# Patient Record
Sex: FEMALE | Race: WHITE | NOT HISPANIC OR LATINO | Employment: UNEMPLOYED | URBAN - METROPOLITAN AREA
[De-identification: names, ages, dates, MRNs, and addresses within clinical notes are randomized per-mention and may not be internally consistent; named-entity substitution may affect disease eponyms.]

---

## 2021-10-28 ENCOUNTER — OFFICE VISIT (OUTPATIENT)
Dept: FAMILY MEDICINE CLINIC | Facility: CLINIC | Age: 21
End: 2021-10-28

## 2021-10-28 VITALS
RESPIRATION RATE: 16 BRPM | OXYGEN SATURATION: 100 % | DIASTOLIC BLOOD PRESSURE: 96 MMHG | HEIGHT: 64 IN | WEIGHT: 245 LBS | SYSTOLIC BLOOD PRESSURE: 118 MMHG | HEART RATE: 95 BPM | BODY MASS INDEX: 41.83 KG/M2

## 2021-10-28 DIAGNOSIS — N92.6 MISSED PERIOD: Primary | ICD-10-CM

## 2021-10-28 LAB
B-HCG UR QL: NEGATIVE
EXPIRATION DATE: NORMAL
INTERNAL NEGATIVE CONTROL: NORMAL
INTERNAL POSITIVE CONTROL: NORMAL
Lab: NORMAL

## 2021-10-28 PROCEDURE — 99203 OFFICE O/P NEW LOW 30 MIN: CPT | Performed by: NURSE PRACTITIONER

## 2021-10-28 PROCEDURE — 81025 URINE PREGNANCY TEST: CPT | Performed by: NURSE PRACTITIONER

## 2021-10-28 NOTE — PATIENT INSTRUCTIONS
Advised patient to get over the counter prenatal vitamin with folic acid.   Return if symptoms worsen or fail to improve.  Call with any questions or concerns.  I will call you with your blood test results.

## 2021-10-28 NOTE — PROGRESS NOTES
Dinh Gaviria is a 21 y.o. female.     History of Present Illness   Patient presents with c/o missed period X 7 days. She reports that she had a positive urine pregnancy and a negative urine pregnancy at home. This is a planned pregnancy and patient is excited. She has a four year old at home.     The following portions of the patient's history were reviewed and updated as appropriate: allergies, current medications, past family history, past medical history, past social history, past surgical history and problem list.    Review of Systems   Constitutional: Negative for chills, fatigue and fever.   Respiratory: Negative for cough, chest tightness and shortness of breath.    Cardiovascular: Negative for chest pain, palpitations and leg swelling.   Gastrointestinal: Positive for nausea.   Genitourinary: Negative for breast discharge, breast lump, breast pain, dysuria, flank pain, frequency, hematuria, pelvic pain, pelvic pressure, vaginal bleeding and vaginal discharge.        Breast tenderness   Neurological: Negative for dizziness, weakness and headache.   Hematological: Negative.    Psychiatric/Behavioral: Negative for agitation, behavioral problems and hallucinations.       Objective   Physical Exam  Vitals and nursing note reviewed.   Constitutional:       Appearance: She is well-developed.   HENT:      Head: Normocephalic and atraumatic.      Right Ear: External ear normal.      Left Ear: External ear normal.      Nose: Nose normal.   Eyes:      Conjunctiva/sclera: Conjunctivae normal.      Pupils: Pupils are equal, round, and reactive to light.   Neck:      Thyroid: No thyromegaly.   Cardiovascular:      Rate and Rhythm: Normal rate and regular rhythm.      Heart sounds: Normal heart sounds. No murmur heard.      Pulmonary:      Effort: Pulmonary effort is normal.      Breath sounds: Normal breath sounds.   Abdominal:      General: Bowel sounds are normal. There is no distension.      Palpations:  Abdomen is soft.      Tenderness: There is no abdominal tenderness.   Musculoskeletal:         General: No deformity. Normal range of motion.      Cervical back: Normal range of motion and neck supple.   Lymphadenopathy:      Cervical: No cervical adenopathy.   Skin:     General: Skin is warm and dry.   Neurological:      Mental Status: She is alert and oriented to person, place, and time.      Cranial Nerves: No cranial nerve deficit.   Psychiatric:         Behavior: Behavior normal.         Thought Content: Thought content normal.         Judgment: Judgment normal.         Vitals:    10/28/21 1349   BP: 118/96   Pulse: 95   Resp: 16   SpO2: 100%     Body mass index is 41.83 kg/m².    Procedures    Assessment/Plan   Problems Addressed this Visit     None      Visit Diagnoses     Missed period    -  Primary    Relevant Orders    POCT pregnancy, urine (Completed)    HCG, B-subunit, Quantitative    Ambulatory Referral to Obstetrics / Gynecology      Diagnoses       Codes Comments    Missed period    -  Primary ICD-10-CM: N92.6  ICD-9-CM: 626.4         Advised patient to get over the counter prenatal vitamin with folic acid.   Referral to OB/GYN  Discussed medications that are safe and not safe during pregnancy.        Return if symptoms worsen or fail to improve.

## 2021-10-29 LAB — HCG INTACT+B SERPL-ACNC: <1 MIU/ML

## 2021-10-29 NOTE — PROGRESS NOTES
Please call patient and let them know that their results were negative. Please tell patient that it just may be too soon to test and wait until about a month past her period or she's had another positive urine test at home.

## 2021-12-10 ENCOUNTER — INITIAL PRENATAL (OUTPATIENT)
Dept: OBSTETRICS AND GYNECOLOGY | Facility: CLINIC | Age: 21
End: 2021-12-10

## 2021-12-10 VITALS — DIASTOLIC BLOOD PRESSURE: 89 MMHG | SYSTOLIC BLOOD PRESSURE: 135 MMHG | WEIGHT: 246 LBS | BODY MASS INDEX: 42 KG/M2

## 2021-12-10 DIAGNOSIS — Z13.71 SCREENING FOR GENETIC DISEASE CARRIER STATUS: ICD-10-CM

## 2021-12-10 DIAGNOSIS — O99.211 MATERNAL MORBID OBESITY IN FIRST TRIMESTER, ANTEPARTUM (HCC): ICD-10-CM

## 2021-12-10 DIAGNOSIS — Z12.4 SCREENING FOR CERVICAL CANCER: ICD-10-CM

## 2021-12-10 DIAGNOSIS — F41.9 ANXIETY IN PREGNANCY IN FIRST TRIMESTER, ANTEPARTUM: ICD-10-CM

## 2021-12-10 DIAGNOSIS — O09.291 HISTORY OF GESTATIONAL DIABETES IN PRIOR PREGNANCY, CURRENTLY PREGNANT IN FIRST TRIMESTER: ICD-10-CM

## 2021-12-10 DIAGNOSIS — Z86.32 HISTORY OF GESTATIONAL DIABETES IN PRIOR PREGNANCY, CURRENTLY PREGNANT IN FIRST TRIMESTER: ICD-10-CM

## 2021-12-10 DIAGNOSIS — Z34.91 PREGNANCY WITH UNCERTAIN DATES IN FIRST TRIMESTER: ICD-10-CM

## 2021-12-10 DIAGNOSIS — O34.219 PREVIOUS CESAREAN DELIVERY, ANTEPARTUM: ICD-10-CM

## 2021-12-10 DIAGNOSIS — O09.91 HIGH-RISK PREGNANCY IN FIRST TRIMESTER: Primary | ICD-10-CM

## 2021-12-10 DIAGNOSIS — E66.01 MATERNAL MORBID OBESITY IN FIRST TRIMESTER, ANTEPARTUM (HCC): ICD-10-CM

## 2021-12-10 DIAGNOSIS — O99.810 ABNORMAL GLUCOSE AFFECTING PREGNANCY: ICD-10-CM

## 2021-12-10 DIAGNOSIS — O99.341 ANXIETY IN PREGNANCY IN FIRST TRIMESTER, ANTEPARTUM: ICD-10-CM

## 2021-12-10 DIAGNOSIS — O09.291 HISTORY OF PRE-ECLAMPSIA IN PRIOR PREGNANCY, CURRENTLY PREGNANT IN FIRST TRIMESTER: ICD-10-CM

## 2021-12-10 DIAGNOSIS — Z11.3 SCREEN FOR STD (SEXUALLY TRANSMITTED DISEASE): ICD-10-CM

## 2021-12-10 LAB
GLUCOSE UR STRIP-MCNC: NEGATIVE MG/DL
PROT UR STRIP-MCNC: NEGATIVE MG/DL

## 2021-12-10 PROCEDURE — 99204 OFFICE O/P NEW MOD 45 MIN: CPT | Performed by: OBSTETRICS & GYNECOLOGY

## 2021-12-10 RX ORDER — SWAB
1 SWAB, NON-MEDICATED MISCELLANEOUS DAILY
Qty: 90 EACH | Refills: 3 | Status: SHIPPED | OUTPATIENT
Start: 2021-12-10 | End: 2021-12-21 | Stop reason: SDUPTHER

## 2021-12-10 RX ORDER — ASPIRIN 81 MG/1
81 TABLET, CHEWABLE ORAL DAILY
Qty: 30 TABLET | Refills: 7 | Status: SHIPPED | OUTPATIENT
Start: 2021-12-10 | End: 2022-02-25 | Stop reason: SDUPTHER

## 2021-12-10 NOTE — PROGRESS NOTES
Initial ob visit     CC- Here for care of pregnancy     Beth Gaviria is being seen today for her first obstetrical visit.  She is a 21 y.o.    10w0d gestation.     # 1 - Date: 18, Sex: Male, Weight: 4479 g (9 lb 14 oz), GA: 39w4d, Delivery: , Low Transverse, Apgar1: None, Apgar5: None, Living: Living, Birth Comments: None    # 2 - Date: None, Sex: None, Weight: None, GA: None, Delivery: None, Apgar1: None, Apgar5: None, Living: None, Birth Comments: None      Current obstetric complaints : nausea   Duration/severity of complaints: 1 week  Initial positive test date : 2021     Location : @ home  Negative test in October    Prior obstetric issues, potential pregnancy concerns: GDM, Preeclampsia, Depression,.   Family history of genetic issues (includes FOB): none  Prior infections concerning in pregnancy (Rash, fever in last 2 weeks): none  Varicella Hx -immunity  Prior testing for Cystic Fibrosis Carrier or Sickle Cell Trait- unknown status   Prepregnancy BMI - Body mass index is 42 kg/m².  Hx of HSV for patient or partner : No     Past Medical History:   Diagnosis Date   • ADHD (attention deficit hyperactivity disorder)    • Anxiety    • Depression    • Diabetes mellitus (HCC)     gestational   • Gestational diabetes    • PONV (postoperative nausea and vomiting)    • Preeclampsia    • Varicella        Past Surgical History:   Procedure Laterality Date   •  SECTION     • CHOLECYSTECTOMY     • LAPAROSCOPIC CHOLECYSTECTOMY  2018   • TONSILLECTOMY     • WISDOM TOOTH EXTRACTION           Current Outpatient Medications:   •  aspirin 81 MG chewable tablet, Chew 1 tablet Daily., Disp: 30 tablet, Rfl: 7  •  Prenatal 28-0.8 MG tablet, Take 1 tablet by mouth Daily. Please use formulary or generic, with DHA ideal, Disp: 90 each, Rfl: 3    Allergies   Allergen Reactions   • Buspirone Swelling and Other (See Comments)     SERVE SWELLING IN THE FACE PER PT  Heart palpitations   • Oxcarbazepine  Other (See Comments)   • Lamotrigine Rash and Hives   • Norelgestromin-Eth Estradiol Swelling   • Amphetamine-Dextroamphetamine Palpitations   • Dicyclomine Rash   • Metoclopramide Irritability and Other (See Comments)     Causes agitation and aggression       Social History     Socioeconomic History   • Marital status: Single   Tobacco Use   • Smoking status: Former Smoker     Packs/day: 0.25     Years: 2.00     Pack years: 0.50     Types: Cigarettes, Electronic Cigarette     Start date: 2016     Quit date: 10/31/2017     Years since quittin.1   • Smokeless tobacco: Never Used   Substance and Sexual Activity   • Alcohol use: Not Currently     Alcohol/week: 0.0 standard drinks   • Drug use: Not Currently   • Sexual activity: Yes     Partners: Male     Birth control/protection: None       Family History   Problem Relation Age of Onset   • Diabetes Mother    • Heart disease Mother    • Hypertension Mother    • Coronary artery disease Mother    • Diabetes Father    • Cancer Father    • Hypertension Father    • Heart attack Father    • Cancer Maternal Grandmother         breast   • Breast cancer Maternal Grandmother    • Heart attack Paternal Grandmother    • Stroke Paternal Grandmother    • Deep vein thrombosis Maternal Uncle    • Ovarian cancer Neg Hx    • Uterine cancer Neg Hx    • Colon cancer Neg Hx    • Pulmonary embolism Neg Hx        Review of systems     Constitutional : Nausea, fatigue nausea present, Fatigue present    : Vaginal bleeding, cramping No vaginal bleeding, No cramping   Breast Tenderness : yes   All other systems reviewed and negative        Objective    /89   Wt 112 kg (246 lb)   LMP 10/01/2021   BMI 42.00 kg/m²       General Appearance:    Alert, cooperative, in no acute distress, habitus obese    Head:    Normocephalic, without obvious abnormality, atraumatic   Eyes:            Lids and lashes normal, conjunctivae and sclerae normal, no   icterus, no pallor, corneas clear    Ears:    Ears appear intact with no abnormalities noted       Neck:   No adenopathy, supple, trachea midline, no thyromegaly   Back:     No kyphosis present, no scoliosis present,                       Lungs:     Clear to auscultation,respirations regular, even and     unlabored    Heart:    Regular rhythm and normal rate, normal S1 and S2, no            murmur, no gallop, no rub, no click   Breast Exam:    No masses, No nipple discharge   Abdomen:     Normal bowel sounds, no masses, no organomegaly, soft        non-tender, non-distended, no guarding, no rebound                 tenderness   Genitalia:    Vulva - BUS-WNL, NEFG    Vagina - No discharge, No bleeding    Cervix - No Lesions, closed     Uterus - Consistent with 6-7 weeks, Mid axial     Adnexa - No mass, NT    Pelvimetry - clinically adequate, gynecoid pelvis     Extremities:   Moves all extremities well, no edema, no cyanosis, no              redness   Pulses:   Pulses palpable and equal bilaterally   Skin:   No bleeding, bruising or rash   Lymph nodes:   No palpable adenopathy   Neurologic:   Sensation intact, A&O times 3      Assessment & Plan     Diagnoses and all orders for this visit:    1. High-risk pregnancy in first trimester (Primary)  -     OB Panel With HIV  -     Urine Culture - , Urine, Clean Catch    2. Pregnancy with uncertain dates in first trimester  -     US Ob Transvaginal    3. Previous  delivery, antepartum    4. Maternal morbid obesity in first trimester, antepartum (HCC)  -     TSH Rfx On Abnormal To Free T4  -     Hemoglobin A1c  -     Comprehensive Metabolic Panel  -     Protein / Creatinine Ratio, Urine - Urine, Clean Catch    5. History of gestational diabetes in prior pregnancy, currently pregnant in first trimester  -     TSH Rfx On Abnormal To Free T4  -     Hemoglobin A1c    6. History of pre-eclampsia in prior pregnancy, currently pregnant in first trimester  -     Comprehensive Metabolic Panel  -     Protein /  Creatinine Ratio, Urine - Urine, Clean Catch    7. Anxiety in pregnancy in first trimester, antepartum    8. Screen for STD (sexually transmitted disease)  -     Chlamydia trachomatis, Neisseria gonorrhoeae, Trichomonas vaginalis, PCR - Swab, Vagina    9. Screening for cervical cancer  -     IGP, Rfx Aptima HPV ASCU    10. Screening for genetic disease carrier status  -     Inheritest Society Guided - Blood,    Other orders  -     Prenatal 28-0.8 MG tablet; Take 1 tablet by mouth Daily. Please use formulary or generic, with DHA ideal  Dispense: 90 each; Refill: 3  -     aspirin 81 MG chewable tablet; Chew 1 tablet Daily.  Dispense: 30 tablet; Refill: 7        1) Pregnancy at 10w0d  2) Uncertain dates/Hx of macrosomia   HCG today and consider ultrasound in about 1-2 weeks   Only recently with positive test   3) Previous    Repeat at 39 weeks   4) Hx of gest DM   Baseline HgA1c, TSH  5) Hx of pre-eclampsia  Baseline labs, Start ASA    6) Anxiety/Depression in pregnancy   Will monitor, not on medication   7) Morbid obesity   Discussed goals/ideal weight gain        Activity recommendation : 150 minutes/week of moderate intensity aerobic activity unless we limit for bleeding, hypertension or other pregnancy complication   Patient is on Prenatal vitamins  Problem list reviewed and updated.  Reviewed routine prenatal care with the office to include but not limited to   Zika (travel restrictions/ok to use insect repellant), not to changing cat litter, food restrictions, avoidance of alcohol, tobacco and drugs and saunas/hot tubs.   All questions answered.     Luis Alberto Asher MD   12/10/2021  10:33 EST

## 2021-12-11 LAB
ABO GROUP BLD: ABNORMAL
ALBUMIN SERPL-MCNC: 4.2 G/DL (ref 3.9–5)
ALBUMIN/GLOB SERPL: 1.4 {RATIO} (ref 1.2–2.2)
ALP SERPL-CCNC: 96 IU/L (ref 44–121)
ALT SERPL-CCNC: 12 IU/L (ref 0–32)
AST SERPL-CCNC: 16 IU/L (ref 0–40)
BASOPHILS # BLD AUTO: 0 X10E3/UL (ref 0–0.2)
BASOPHILS NFR BLD AUTO: 0 %
BILIRUB SERPL-MCNC: 0.3 MG/DL (ref 0–1.2)
BLD GP AB SCN SERPL QL: NEGATIVE
BUN SERPL-MCNC: 5 MG/DL (ref 6–20)
BUN/CREAT SERPL: 7 (ref 9–23)
CALCIUM SERPL-MCNC: 9.4 MG/DL (ref 8.7–10.2)
CHLORIDE SERPL-SCNC: 99 MMOL/L (ref 96–106)
CO2 SERPL-SCNC: 20 MMOL/L (ref 20–29)
CREAT SERPL-MCNC: 0.67 MG/DL (ref 0.57–1)
CREAT UR-MCNC: 99.8 MG/DL
EOSINOPHIL # BLD AUTO: 0.1 X10E3/UL (ref 0–0.4)
EOSINOPHIL NFR BLD AUTO: 1 %
ERYTHROCYTE [DISTWIDTH] IN BLOOD BY AUTOMATED COUNT: 15.1 % (ref 11.7–15.4)
GLOBULIN SER CALC-MCNC: 3.1 G/DL (ref 1.5–4.5)
GLUCOSE SERPL-MCNC: 117 MG/DL (ref 65–99)
HBA1C MFR BLD: 5.9 % (ref 4.8–5.6)
HBV SURFACE AG SERPL QL IA: NEGATIVE
HCG INTACT+B SERPL-ACNC: NORMAL MIU/ML
HCT VFR BLD AUTO: 39.3 % (ref 34–46.6)
HCV AB S/CO SERPL IA: <0.1 S/CO RATIO (ref 0–0.9)
HGB BLD-MCNC: 12.7 G/DL (ref 11.1–15.9)
HIV 1+2 AB+HIV1 P24 AG SERPL QL IA: NON REACTIVE
IMM GRANULOCYTES # BLD AUTO: 0.1 X10E3/UL (ref 0–0.1)
IMM GRANULOCYTES NFR BLD AUTO: 1 %
LYMPHOCYTES # BLD AUTO: 3 X10E3/UL (ref 0.7–3.1)
LYMPHOCYTES NFR BLD AUTO: 22 %
MCH RBC QN AUTO: 25.4 PG (ref 26.6–33)
MCHC RBC AUTO-ENTMCNC: 32.3 G/DL (ref 31.5–35.7)
MCV RBC AUTO: 79 FL (ref 79–97)
MONOCYTES # BLD AUTO: 0.8 X10E3/UL (ref 0.1–0.9)
MONOCYTES NFR BLD AUTO: 6 %
NEUTROPHILS # BLD AUTO: 9.8 X10E3/UL (ref 1.4–7)
NEUTROPHILS NFR BLD AUTO: 70 %
PLATELET # BLD AUTO: 480 X10E3/UL (ref 150–450)
POTASSIUM SERPL-SCNC: 4.2 MMOL/L (ref 3.5–5.2)
PROT SERPL-MCNC: 7.3 G/DL (ref 6–8.5)
PROT UR-MCNC: 8.5 MG/DL
PROT/CREAT UR: 85 MG/G CREAT (ref 0–200)
RBC # BLD AUTO: 5 X10E6/UL (ref 3.77–5.28)
RH BLD: POSITIVE
RPR SER QL: NON REACTIVE
RUBV IGG SERPL IA-ACNC: 1.48 INDEX
SODIUM SERPL-SCNC: 137 MMOL/L (ref 134–144)
TSH SERPL DL<=0.005 MIU/L-ACNC: 2.39 UIU/ML (ref 0.45–4.5)
WBC # BLD AUTO: 13.8 X10E3/UL (ref 3.4–10.8)

## 2021-12-12 LAB
BACTERIA UR CULT: NORMAL
BACTERIA UR CULT: NORMAL

## 2021-12-13 LAB
C TRACH RRNA SPEC QL NAA+PROBE: NEGATIVE
N GONORRHOEA RRNA SPEC QL NAA+PROBE: NEGATIVE
T VAGINALIS DNA SPEC QL NAA+PROBE: NEGATIVE

## 2021-12-15 ENCOUNTER — TELEPHONE (OUTPATIENT)
Dept: OBSTETRICS AND GYNECOLOGY | Facility: CLINIC | Age: 21
End: 2021-12-15

## 2021-12-15 NOTE — TELEPHONE ENCOUNTER
"Adalberto Calero,    Pt aware: \"Galilea, New pregnancy seen on Friday. HCG is great at 15,857 which is consistent with 6+ weeks. So would keep appointment for ultrasound and visit on 12/21/21.  Otherwise, some concern with elevated random blood sugar and HgA1c at 5.9%.  Need to do 3 hour in next few weeks to see if has diabetes?  Order in Epic. Please let her know. Thanks, Dr. Asher\"    She will come in at 8:30 on 12/21 to do her 3 hr GTT.    Thank you,  Gris"

## 2021-12-15 NOTE — TELEPHONE ENCOUNTER
----- Message from Galilea Acosta MA sent at 12/14/2021  1:36 PM EST -----  L/m for pt/mello  ----- Message -----  From: Galilea Acosta MA  Sent: 12/13/2021   4:37 PM EST  To: Galilea Acosta MA    L/m for pt/mello  ----- Message -----  From: Luis Alberto Asher MD  Sent: 12/13/2021   8:45 AM EST  To: ABEL De Leon, New pregnancy seen on Friday. HCG is great at 15,857 which is consistent with 6+ weeks. So would keep appointment for ultrasound and visit on 12/21/21.  Otherwise, some concern with elevated random blood sugar and HgA1c at 5.9%.  Need to do 3 hour in next few weeks to see if has diabetes?  Order in Epic. Please let her know. Thanks, Dr. Asher

## 2021-12-16 LAB
CONV .: NORMAL
CYTOLOGIST CVX/VAG CYTO: NORMAL
CYTOLOGY CVX/VAG DOC CYTO: NORMAL
CYTOLOGY CVX/VAG DOC THIN PREP: NORMAL
DX ICD CODE: NORMAL
HIV 1 & 2 AB SER-IMP: NORMAL
Lab: NORMAL
OTHER STN SPEC: NORMAL
STAT OF ADQ CVX/VAG CYTO-IMP: NORMAL

## 2021-12-21 ENCOUNTER — ROUTINE PRENATAL (OUTPATIENT)
Dept: OBSTETRICS AND GYNECOLOGY | Facility: CLINIC | Age: 21
End: 2021-12-21

## 2021-12-21 VITALS — DIASTOLIC BLOOD PRESSURE: 84 MMHG | WEIGHT: 245 LBS | BODY MASS INDEX: 41.83 KG/M2 | SYSTOLIC BLOOD PRESSURE: 136 MMHG

## 2021-12-21 DIAGNOSIS — O34.219 PREVIOUS CESAREAN DELIVERY, ANTEPARTUM: ICD-10-CM

## 2021-12-21 DIAGNOSIS — O09.291 HISTORY OF GESTATIONAL DIABETES IN PRIOR PREGNANCY, CURRENTLY PREGNANT IN FIRST TRIMESTER: ICD-10-CM

## 2021-12-21 DIAGNOSIS — O99.211 MATERNAL MORBID OBESITY IN FIRST TRIMESTER, ANTEPARTUM (HCC): ICD-10-CM

## 2021-12-21 DIAGNOSIS — Z86.32 HISTORY OF GESTATIONAL DIABETES IN PRIOR PREGNANCY, CURRENTLY PREGNANT IN FIRST TRIMESTER: ICD-10-CM

## 2021-12-21 DIAGNOSIS — F41.9 ANXIETY IN PREGNANCY IN FIRST TRIMESTER, ANTEPARTUM: ICD-10-CM

## 2021-12-21 DIAGNOSIS — O99.341 ANXIETY IN PREGNANCY IN FIRST TRIMESTER, ANTEPARTUM: ICD-10-CM

## 2021-12-21 DIAGNOSIS — O09.291 HISTORY OF PRE-ECLAMPSIA IN PRIOR PREGNANCY, CURRENTLY PREGNANT IN FIRST TRIMESTER: ICD-10-CM

## 2021-12-21 DIAGNOSIS — O09.91 HIGH-RISK PREGNANCY IN FIRST TRIMESTER: Primary | ICD-10-CM

## 2021-12-21 DIAGNOSIS — O30.041 DICHORIONIC DIAMNIOTIC TWIN PREGNANCY IN FIRST TRIMESTER: ICD-10-CM

## 2021-12-21 DIAGNOSIS — E66.01 MATERNAL MORBID OBESITY IN FIRST TRIMESTER, ANTEPARTUM (HCC): ICD-10-CM

## 2021-12-21 LAB
GLUCOSE UR STRIP-MCNC: NEGATIVE MG/DL
PROT UR STRIP-MCNC: NEGATIVE MG/DL

## 2021-12-21 PROCEDURE — 99214 OFFICE O/P EST MOD 30 MIN: CPT | Performed by: OBSTETRICS & GYNECOLOGY

## 2021-12-21 RX ORDER — PROMETHAZINE HYDROCHLORIDE 25 MG/1
25 TABLET ORAL EVERY 6 HOURS PRN
Qty: 30 TABLET | Refills: 2 | Status: SHIPPED | OUTPATIENT
Start: 2021-12-21 | End: 2022-04-12

## 2021-12-21 RX ORDER — SWAB
1 SWAB, NON-MEDICATED MISCELLANEOUS DAILY
Qty: 90 EACH | Refills: 3 | Status: SHIPPED | OUTPATIENT
Start: 2021-12-21 | End: 2022-09-23

## 2021-12-21 NOTE — PROGRESS NOTES
OB follow up     Beth Gaviria is a 21 y.o.  11w4d being seen today for her obstetrical visit.  Patient reports vomiting, lack of appetite. . Fetal movement: absent. .    Her prenatal care is complicated by (and status): Prior , Hx of GDM, Hx of PIH, Obesity     Review of Systems  Cramping/contractions : none   Vaginal bleeding: none   Fetal movement good     /84   Wt 111 kg (245 lb)   LMP 10/01/2021   BMI 41.83 kg/m²     FHT: +/+ on ultrasound   Uterine Size: 7 cm       Assessment    Diagnoses and all orders for this visit:    1. High-risk pregnancy in first trimester (Primary)    2. Dichorionic diamniotic twin pregnancy in first trimester    3. Previous  delivery, antepartum    4. Maternal morbid obesity in first trimester, antepartum (HCC)    5. History of gestational diabetes in prior pregnancy, currently pregnant in first trimester    6. History of pre-eclampsia in prior pregnancy, currently pregnant in first trimester    7. Anxiety in pregnancy in first trimester, antepartum    Other orders  -     promethazine (PHENERGAN) 25 MG tablet; Take 1 tablet by mouth Every 6 (Six) Hours As Needed for Nausea or Vomiting.  Dispense: 30 tablet; Refill: 2  -     Prenatal 28-0.8 MG tablet; Take 1 tablet by mouth Daily. Please use formulary or generic, with DHA ideal  Dispense: 90 each; Refill: 3        1) pregnancy at 11w4d   Labs, cultures and ultrasound reviewed.   2) Di/Di twin gestation found today on ultrasound with EDC of 22   Quick overview of twins and care of twins reviewed.   3) Prior    Repeat planned at term   4) Maternal morbid obesity   Good interval/overall changes.   5) Hx of GDM  Elevated HgA1c, RBS   Checking 3 hour today   Follow up per results   6) Hx of PIH   Start ASA at 12 weeks   7) Anxiety/derpression in pregnancy         Plan    Reviewed this stage of pregnancy  Problem list updated   Follow up in 4 weeks    Luis Alberto Asher MD   2021  10:00 EST

## 2021-12-22 ENCOUNTER — TELEPHONE (OUTPATIENT)
Dept: OBSTETRICS AND GYNECOLOGY | Facility: CLINIC | Age: 21
End: 2021-12-22

## 2021-12-22 LAB
GLUCOSE 1H P 100 G GLC PO SERPL-MCNC: 205 MG/DL (ref 65–179)
GLUCOSE 2H P 100 G GLC PO SERPL-MCNC: 145 MG/DL (ref 65–154)
GLUCOSE 3H P 100 G GLC PO SERPL-MCNC: 139 MG/DL (ref 65–139)
GLUCOSE P FAST SERPL-MCNC: 88 MG/DL (ref 65–94)
Lab: ABNORMAL

## 2021-12-22 NOTE — TELEPHONE ENCOUNTER
----- Message from Luis Alberto Asher MD sent at 12/22/2021  8:57 AM EST -----  Galilea, she passed her 3 hour with one elevated. So will need to repeat at 26 weeks. Until then should avoid candy, cakes, cookies - any concentrated sweats. Thanks, Dr. Asher

## 2022-01-03 LAB
CLINICAL INFO: NORMAL
ETHNIC BACKGROUND STATED: NORMAL
GENE DIS ANL CARRIER INTERP-IMP: NORMAL
GENE MUT TESTED BLD/T: NORMAL
GENERAL COMMENTS:: NORMAL
LAB DIRECTOR NAME PROVIDER: NORMAL
LABORATORY COMMENT REPORT: NORMAL
Lab: NORMAL
REASON FOR REFERRAL (NARRATIVE): NORMAL
REF LAB TEST METHOD: NORMAL
SPECIMEN SOURCE: NORMAL

## 2022-01-21 ENCOUNTER — ROUTINE PRENATAL (OUTPATIENT)
Dept: OBSTETRICS AND GYNECOLOGY | Facility: CLINIC | Age: 22
End: 2022-01-21

## 2022-01-21 VITALS — WEIGHT: 251 LBS | DIASTOLIC BLOOD PRESSURE: 83 MMHG | SYSTOLIC BLOOD PRESSURE: 129 MMHG | BODY MASS INDEX: 42.85 KG/M2

## 2022-01-21 DIAGNOSIS — E66.01 MATERNAL MORBID OBESITY IN FIRST TRIMESTER, ANTEPARTUM: ICD-10-CM

## 2022-01-21 DIAGNOSIS — O30.041 DICHORIONIC DIAMNIOTIC TWIN PREGNANCY IN FIRST TRIMESTER: ICD-10-CM

## 2022-01-21 DIAGNOSIS — O09.291 HISTORY OF GESTATIONAL DIABETES IN PRIOR PREGNANCY, CURRENTLY PREGNANT IN FIRST TRIMESTER: ICD-10-CM

## 2022-01-21 DIAGNOSIS — O09.91 HIGH-RISK PREGNANCY IN FIRST TRIMESTER: Primary | ICD-10-CM

## 2022-01-21 DIAGNOSIS — O99.211 MATERNAL MORBID OBESITY IN FIRST TRIMESTER, ANTEPARTUM: ICD-10-CM

## 2022-01-21 DIAGNOSIS — Z86.32 HISTORY OF GESTATIONAL DIABETES IN PRIOR PREGNANCY, CURRENTLY PREGNANT IN FIRST TRIMESTER: ICD-10-CM

## 2022-01-21 DIAGNOSIS — O09.291 HISTORY OF PRE-ECLAMPSIA IN PRIOR PREGNANCY, CURRENTLY PREGNANT IN FIRST TRIMESTER: ICD-10-CM

## 2022-01-21 DIAGNOSIS — O34.219 PREVIOUS CESAREAN DELIVERY, ANTEPARTUM: ICD-10-CM

## 2022-01-21 LAB
GLUCOSE UR STRIP-MCNC: NEGATIVE MG/DL
PROT UR STRIP-MCNC: NEGATIVE MG/DL

## 2022-01-21 PROCEDURE — 99213 OFFICE O/P EST LOW 20 MIN: CPT | Performed by: OBSTETRICS & GYNECOLOGY

## 2022-01-21 NOTE — PROGRESS NOTES
OB follow up     Beth Gaviria is a 21 y.o.  11w3d being seen today for her obstetrical visit.  Patient reports heart burn. Fetal movement: absent.    Her prenatal care is complicated by (and status): Prior , Hx of Gest DM, Hx of PIH and obesity (pre-gravid BMI > 40)     Review of Systems  Cramping/contractions : none   Vaginal bleeding: none   Fetal movement too early     /83   Wt 114 kg (251 lb)   LMP 10/01/2021   BMI 42.85 kg/m²     FHT: 154 and 145 BPM   Uterine Size: 11 cm       Assessment    Diagnoses and all orders for this visit:    1. High-risk pregnancy in first trimester (Primary)    2. Dichorionic diamniotic twin pregnancy in first trimester    3. Previous  delivery, antepartum    4. Maternal morbid obesity in first trimester, antepartum (HCC)    5. History of gestational diabetes in prior pregnancy, currently pregnant in first trimester    6. History of pre-eclampsia in prior pregnancy, currently pregnant in first trimester        1) pregnancy at 11w3d   Discussed NIPT, declined for now.   2) Di/Di twins   Doing well.   3) Prior    Plan repeat at term   4) Hx of GDM  Elevated HgA1c, RBS   3 hour with one elevated  So not on treatment/monitorring for now.   5) Hx of PIH   On baby ASA   6) Maternal obesity   Overall/interval gain okay   pregravid BMI > 40   Heartburn in pregnancy reviewed.   Using tums and pepcid       Plan    Reviewed this stage of pregnancy  Problem list updated   Follow up in 4 weeks    Luis Alberto Asher MD   2022  12:53 EST

## 2022-02-25 ENCOUNTER — ROUTINE PRENATAL (OUTPATIENT)
Dept: OBSTETRICS AND GYNECOLOGY | Facility: CLINIC | Age: 22
End: 2022-02-25

## 2022-02-25 VITALS — WEIGHT: 241 LBS | BODY MASS INDEX: 41.14 KG/M2 | SYSTOLIC BLOOD PRESSURE: 145 MMHG | DIASTOLIC BLOOD PRESSURE: 93 MMHG

## 2022-02-25 DIAGNOSIS — Z86.32 HISTORY OF GESTATIONAL DIABETES IN PRIOR PREGNANCY, CURRENTLY PREGNANT IN SECOND TRIMESTER: ICD-10-CM

## 2022-02-25 DIAGNOSIS — O09.292 HISTORY OF GESTATIONAL DIABETES IN PRIOR PREGNANCY, CURRENTLY PREGNANT IN SECOND TRIMESTER: ICD-10-CM

## 2022-02-25 DIAGNOSIS — O99.212 MATERNAL MORBID OBESITY IN SECOND TRIMESTER, ANTEPARTUM: ICD-10-CM

## 2022-02-25 DIAGNOSIS — O30.042 DICHORIONIC DIAMNIOTIC TWIN PREGNANCY IN SECOND TRIMESTER: ICD-10-CM

## 2022-02-25 DIAGNOSIS — E66.01 MATERNAL MORBID OBESITY IN SECOND TRIMESTER, ANTEPARTUM: ICD-10-CM

## 2022-02-25 DIAGNOSIS — O09.292 HISTORY OF PRE-ECLAMPSIA IN PRIOR PREGNANCY, CURRENTLY PREGNANT IN SECOND TRIMESTER: ICD-10-CM

## 2022-02-25 DIAGNOSIS — Z36.89 ENCOUNTER FOR FETAL ANATOMIC SURVEY: ICD-10-CM

## 2022-02-25 DIAGNOSIS — O34.219 PREVIOUS CESAREAN DELIVERY, ANTEPARTUM: ICD-10-CM

## 2022-02-25 DIAGNOSIS — O09.92 HIGH-RISK PREGNANCY IN SECOND TRIMESTER: Primary | ICD-10-CM

## 2022-02-25 LAB
GLUCOSE UR STRIP-MCNC: NEGATIVE MG/DL
PROT UR STRIP-MCNC: NEGATIVE MG/DL

## 2022-02-25 PROCEDURE — 99214 OFFICE O/P EST MOD 30 MIN: CPT | Performed by: OBSTETRICS & GYNECOLOGY

## 2022-02-25 RX ORDER — ASPIRIN 81 MG/1
81 TABLET, CHEWABLE ORAL DAILY
Qty: 30 TABLET | Refills: 7 | Status: SHIPPED | OUTPATIENT
Start: 2022-02-25 | End: 2022-07-06 | Stop reason: HOSPADM

## 2022-02-25 NOTE — PROGRESS NOTES
OB follow up     Beth Gaviria is a 22 y.o.  16w3d being seen today for her obstetrical visit.  Patient reports dizziness, elevated heart rate.. Fetal movement: normal.  Needs a new prescription for ASA    Her prenatal care is complicated by (and status): Prior - Di/DI twins, Hx of Gest DM, Hx of PIH and Obesity (pre-pregnancy BMI > 40)     Review of Systems  Cramping/contractions : none   Vaginal bleeding: noted 2 weeks ago   Fetal movement present x 2    /93   Wt 109 kg (241 lb)   LMP 10/01/2021   BMI 41.14 kg/m²     FHT: 144 and 154 BPM   Uterine Size: 18 cm       Assessment    Diagnoses and all orders for this visit:    1. High-risk pregnancy in second trimester (Primary)    2. Dichorionic diamniotic twin pregnancy in second trimester    3. Previous  delivery, antepartum    4. Maternal morbid obesity in second trimester, antepartum (HCC)    5. History of gestational diabetes in prior pregnancy, currently pregnant in second trimester    6. History of pre-eclampsia in prior pregnancy, currently pregnant in second trimester    7. Encounter for fetal anatomic survey  -     US Ob 14 + Weeks Single or First Gestation        1) pregnancy at 16w3d   Quickening   Down syndrome screen   COVID/FLu shots  Anatomy scan   2) Di/Di twins   Check with growth/anatomy in 3 weeks   3) Prior    Plan to repeat at term with delivery   4) Hx of Gest DM  3 hour done and normal.   5) Hx of pre-eclampsia   On ASA (resending  BP borderline today   Will continue to monitor   6) Maternal morbid obesity   Pre pregnancy BMI > 40   Good overall/interval gains    - Anatomy in 3 weeks  - Bleeding precautions   - near syncopal episodes/palpitations         Plan    Reviewed this stage of pregnancy  Problem list updated   Follow up in 3 weeks    Luis Alberto Asher MD   2022  12:38 EST

## 2022-03-09 ENCOUNTER — TELEPHONE (OUTPATIENT)
Dept: OBSTETRICS AND GYNECOLOGY | Facility: CLINIC | Age: 22
End: 2022-03-09

## 2022-03-18 ENCOUNTER — ROUTINE PRENATAL (OUTPATIENT)
Dept: OBSTETRICS AND GYNECOLOGY | Facility: CLINIC | Age: 22
End: 2022-03-18

## 2022-03-18 VITALS — DIASTOLIC BLOOD PRESSURE: 90 MMHG | BODY MASS INDEX: 41.83 KG/M2 | SYSTOLIC BLOOD PRESSURE: 127 MMHG | WEIGHT: 245 LBS

## 2022-03-18 DIAGNOSIS — Z36.2 ENCOUNTER FOR OTHER ANTENATAL SCREENING FOLLOW-UP: ICD-10-CM

## 2022-03-18 DIAGNOSIS — E66.01 MATERNAL MORBID OBESITY IN SECOND TRIMESTER, ANTEPARTUM: ICD-10-CM

## 2022-03-18 DIAGNOSIS — O30.042 DICHORIONIC DIAMNIOTIC TWIN PREGNANCY IN SECOND TRIMESTER: ICD-10-CM

## 2022-03-18 DIAGNOSIS — O99.212 MATERNAL MORBID OBESITY IN SECOND TRIMESTER, ANTEPARTUM: ICD-10-CM

## 2022-03-18 DIAGNOSIS — Z86.32 HISTORY OF GESTATIONAL DIABETES IN PRIOR PREGNANCY, CURRENTLY PREGNANT IN SECOND TRIMESTER: ICD-10-CM

## 2022-03-18 DIAGNOSIS — O09.92 HIGH-RISK PREGNANCY IN SECOND TRIMESTER: Primary | ICD-10-CM

## 2022-03-18 DIAGNOSIS — O34.219 PREVIOUS CESAREAN DELIVERY, ANTEPARTUM: ICD-10-CM

## 2022-03-18 DIAGNOSIS — O09.292 HISTORY OF PRE-ECLAMPSIA IN PRIOR PREGNANCY, CURRENTLY PREGNANT IN SECOND TRIMESTER: ICD-10-CM

## 2022-03-18 DIAGNOSIS — O09.292 HISTORY OF GESTATIONAL DIABETES IN PRIOR PREGNANCY, CURRENTLY PREGNANT IN SECOND TRIMESTER: ICD-10-CM

## 2022-03-18 LAB
GLUCOSE UR STRIP-MCNC: NEGATIVE MG/DL
PROT UR STRIP-MCNC: NEGATIVE MG/DL

## 2022-03-18 PROCEDURE — 99214 OFFICE O/P EST MOD 30 MIN: CPT | Performed by: OBSTETRICS & GYNECOLOGY

## 2022-03-18 RX ORDER — LABETALOL 100 MG/1
100 TABLET, FILM COATED ORAL 2 TIMES DAILY
Qty: 60 TABLET | Refills: 2 | Status: SHIPPED | OUTPATIENT
Start: 2022-03-18 | End: 2022-04-12

## 2022-03-18 NOTE — PROGRESS NOTES
OB follow up     Beth Gaviria is a 22 y.o.  19w3d being seen today for her obstetrical visit.  Patient reports elevated blood pressure readings at home- 165/90. . Fetal movement: normal.    Her prenatal care is complicated by (and status): Prior , Di/Di twins, Hx of Gest DM, Hx of PIH, and obesity (pre-pregnancy BMI > 40)     Notes elevation in BP at home.   As high as 165/90  Symptomatic with dizziness and other complaints.     Review of Systems  Cramping/contractions : none   Vaginal bleeding: none   Fetal movement good     /90   Wt 111 kg (245 lb)   LMP 10/01/2021   BMI 41.83 kg/m²     FHT: 134 and 144 BPM   Uterine Size: 21 cm       Assessment    Diagnoses and all orders for this visit:    1. High-risk pregnancy in second trimester (Primary)    2. Dichorionic diamniotic twin pregnancy in second trimester  -     US Ob Follow Up Transabdominal Approach; Future    3. Previous  delivery, antepartum    4. Maternal morbid obesity in second trimester, antepartum (HCC)    5. History of gestational diabetes in prior pregnancy, currently pregnant in second trimester    6. History of pre-eclampsia in prior pregnancy, currently pregnant in second trimester    7. Encounter for other  screening follow-up  -     US Ob Follow Up Transabdominal Approach; Future    Other orders  -     labetalol (NORMODYNE) 100 MG tablet; Take 1 tablet by mouth 2 (Two) Times a Day.  Dispense: 60 tablet; Refill: 2        1) pregnancy at 19w3d   Anatomy scan x 2  Essentially normal, except not able to see everything.   Repeat in 4 weeks.   2) Chronic HTN   BP elevated last two visits  At home high, and possible symptoms  Trial of labetalol 100 mg BID to start   Call if too low or not enough in next 2 weeks   3) Prior , plan to repeat at temr   4) DI/Di twin gestation  5) Hx of GDM/PIH   Too early for pre-eclampsia, prior testing good on GDM -repeat end of second trimester.         Plan    Reviewed  this stage of pregnancy  Problem list updated   Follow up in 4 weeks    Luis Alberto Asher MD   3/18/2022  12:55 EDT

## 2022-04-06 ENCOUNTER — HOSPITAL ENCOUNTER (OUTPATIENT)
Facility: HOSPITAL | Age: 22
End: 2022-04-06
Attending: OBSTETRICS & GYNECOLOGY | Admitting: OBSTETRICS & GYNECOLOGY

## 2022-04-06 ENCOUNTER — HOSPITAL ENCOUNTER (EMERGENCY)
Facility: HOSPITAL | Age: 22
Discharge: HOME | End: 2022-04-06
Attending: OBSTETRICS & GYNECOLOGY | Admitting: OBSTETRICS & GYNECOLOGY

## 2022-04-06 VITALS
SYSTOLIC BLOOD PRESSURE: 124 MMHG | TEMPERATURE: 98 F | RESPIRATION RATE: 18 BRPM | HEART RATE: 106 BPM | BODY MASS INDEX: 41.83 KG/M2 | WEIGHT: 245 LBS | HEIGHT: 64 IN | OXYGEN SATURATION: 100 % | DIASTOLIC BLOOD PRESSURE: 74 MMHG

## 2022-04-06 LAB
BACTERIA UR QL AUTO: ABNORMAL /HPF
BILIRUB UR QL STRIP: NEGATIVE
CLARITY UR: CLEAR
COLOR UR: YELLOW
GLUCOSE UR STRIP-MCNC: NEGATIVE MG/DL
HGB UR QL STRIP.AUTO: ABNORMAL
HYALINE CASTS UR QL AUTO: ABNORMAL /LPF
KETONES UR QL STRIP: ABNORMAL
LEUKOCYTE ESTERASE UR QL STRIP.AUTO: ABNORMAL
NITRITE UR QL STRIP: NEGATIVE
PH UR STRIP.AUTO: 8 [PH] (ref 5–8)
PROT UR QL STRIP: NEGATIVE
RBC # UR STRIP: ABNORMAL /HPF
REF LAB TEST METHOD: ABNORMAL
SP GR UR STRIP: 1.02 (ref 1–1.03)
SQUAMOUS #/AREA URNS HPF: ABNORMAL /HPF
UROBILINOGEN UR QL STRIP: ABNORMAL
WBC # UR STRIP: ABNORMAL /HPF

## 2022-04-06 PROCEDURE — 81001 URINALYSIS AUTO W/SCOPE: CPT | Performed by: OBSTETRICS & GYNECOLOGY

## 2022-04-06 PROCEDURE — 99283 EMERGENCY DEPT VISIT LOW MDM: CPT | Performed by: OBSTETRICS & GYNECOLOGY

## 2022-04-06 PROCEDURE — 87086 URINE CULTURE/COLONY COUNT: CPT | Performed by: OBSTETRICS & GYNECOLOGY

## 2022-04-06 NOTE — OBED NOTES
Epifanio Gaviria  : 2000  MRN: [id number]  CSN: [id number]    GEE Note    Subjective   Chief Complaint   Patient presents with   • Abdominal Cramping     22 1/2 twins, prior C/S     Beth Gaviria is a 22 y.o. year old  with an Estimated Date of Delivery: 22 currently at 22w1d presenting with abdominal cramping and unsure if she is leaking.  She denies vaginal bleeding.   She is a known twin gestation (di/di).    Prenatal care has been with Dr Asher.      OB History    Para Term  AB Living   2 1 1 0 0 1   SAB IAB Ectopic Molar Multiple Live Births   0 0 0 0 0 1      # Outcome Date GA Lbr Prasanna/2nd Weight Sex Delivery Anes PTL Lv   2 Current            1 Term 18 39w4d  4479 g (9 lb 14 oz) M CS-LTranv Spinal N DINA      Complications: Decreased fetal movement, LGA (large for gestational age) fetus affecting management of mother, GDM (gestational diabetes mellitus), Preeclampsia     Past Medical History:   Diagnosis Date   • ADHD (attention deficit hyperactivity disorder)    • Anxiety    • Depression    • Diabetes mellitus (HCC)     gestational   • Gestational diabetes    • PONV (postoperative nausea and vomiting)    • Preeclampsia    • Varicella      Past Surgical History:   Procedure Laterality Date   • LAPAROSCOPIC CHOLECYSTECTOMY  2018   •  SECTION     • CHOLECYSTECTOMY     • TONSILLECTOMY     • WISDOM TOOTH EXTRACTION       No current facility-administered medications for this encounter.    Allergies   Allergen Reactions   • Buspirone Swelling and Other (See Comments)     SERVE SWELLING IN THE FACE PER PT  Heart palpitations   • Oxcarbazepine Other (See Comments)   • Lamotrigine Rash and Hives   • Norelgestromin-Eth Estradiol Swelling   • Amphetamine-Dextroamphetamine Palpitations   • Dicyclomine Rash   • Metoclopramide Irritability and Other (See Comments)     Causes agitation and aggression     Social History    Tobacco Use      Smoking status: Former  Smoker        Packs/day: 0.25        Years: 2.00        Pack years: .5        Types: Cigarettes, Electronic Cigarette        Start date: 2016        Quit date: 10/31/2017        Years since quittin.4      Smokeless tobacco: Never Used    Review of Systems   Negative except for HPI      Objective   LMP 10/01/2021   General: WD/WN NAD   Abdomen: Soft, nontender   FHT's: 160 baseline with both twins      Cervix: Closed/50%/high   Presentation: deferred   Contractions: none   Back: No CVA tenderness     Prenatal Labs  Lab Results   Component Value Date    HGB 12.7 12/10/2021    RUBELLAABIGG 1.48 12/10/2021    HEPBSAG Negative 12/10/2021    ABSCRN Negative 12/10/2021    CRT9SRC1 Non Reactive 12/10/2021    HEPCVIRUSABY <0.1 12/10/2021    GGTFASTING 88 2021    SAG9KRRB 205 (H) 2021    GQO6YFWK 145 2021    QJZ7LWKZ 139 2021    URINECX Final report 12/10/2021    CHLAMNAA Negative 12/10/2021    NGONORRHON Negative 12/10/2021       Current Labs Reviewed    Latest Reference Range & Units 22 12:28   Color, UA Yellow, Straw  Yellow   Appearance, UA Clear  Clear   Specific Gravity, UA 1.005 - 1.030  1.017   PH, UA 5.0 - 8.0  8.0   Glucose Negative  Negative   Ketones, UA Negative  40 mg/dL (2+) !   Blood, UA Negative  Small (1+) !   Nitrite, UA Negative  Negative   Leukocytes, UA Negative  Trace !   Protein, UA Negative  Negative   Bilirubin, UA Negative  Negative   Urobilinogen, UA 0.2 - 1.0 E.U./dL  1.0 E.U./dL   RBC, UA None Seen, 0-2 /HPF 13-20 !   WBC, UA None Seen, 0-2 /HPF 0-2   Bacteria, UA None Seen /HPF None Seen   Squamous Epithelial Cells, UA None Seen, 0-2 /HPF 3-6 !   Hyaline Casts, UA None Seen /LPF 0-2   Methodology:  Automated Microscopy          Assessment   1. IUP at 22w1d  2. Abdominal cramping  3. No amniotic fluid leakage     Plan   1. Discharge home  2. Keep OB appointment in 1 week  3. Call the office in 2 days for urine culture results  4. Hydrate at home    Lorrie WU  MD Marvin   OB Hospitalist on call  4/6/2022

## 2022-04-07 LAB — BACTERIA SPEC AEROBE CULT: NO GROWTH

## 2022-04-12 ENCOUNTER — ROUTINE PRENATAL (OUTPATIENT)
Dept: OBSTETRICS AND GYNECOLOGY | Facility: CLINIC | Age: 22
End: 2022-04-12

## 2022-04-12 ENCOUNTER — TELEPHONE (OUTPATIENT)
Dept: OBSTETRICS AND GYNECOLOGY | Facility: CLINIC | Age: 22
End: 2022-04-12

## 2022-04-12 VITALS — DIASTOLIC BLOOD PRESSURE: 89 MMHG | SYSTOLIC BLOOD PRESSURE: 137 MMHG | BODY MASS INDEX: 42.57 KG/M2 | WEIGHT: 248 LBS

## 2022-04-12 DIAGNOSIS — O99.212 MATERNAL MORBID OBESITY IN SECOND TRIMESTER, ANTEPARTUM: ICD-10-CM

## 2022-04-12 DIAGNOSIS — O09.292 HISTORY OF PRE-ECLAMPSIA IN PRIOR PREGNANCY, CURRENTLY PREGNANT IN SECOND TRIMESTER: ICD-10-CM

## 2022-04-12 DIAGNOSIS — O09.292 HISTORY OF GESTATIONAL DIABETES IN PRIOR PREGNANCY, CURRENTLY PREGNANT IN SECOND TRIMESTER: ICD-10-CM

## 2022-04-12 DIAGNOSIS — Z86.32 HISTORY OF GESTATIONAL DIABETES IN PRIOR PREGNANCY, CURRENTLY PREGNANT IN SECOND TRIMESTER: ICD-10-CM

## 2022-04-12 DIAGNOSIS — O34.219 PREVIOUS CESAREAN DELIVERY, ANTEPARTUM: ICD-10-CM

## 2022-04-12 DIAGNOSIS — O30.042 DICHORIONIC DIAMNIOTIC TWIN PREGNANCY IN SECOND TRIMESTER: Primary | ICD-10-CM

## 2022-04-12 DIAGNOSIS — E66.01 MATERNAL MORBID OBESITY IN SECOND TRIMESTER, ANTEPARTUM: ICD-10-CM

## 2022-04-12 DIAGNOSIS — O09.92 HIGH-RISK PREGNANCY IN SECOND TRIMESTER: ICD-10-CM

## 2022-04-12 DIAGNOSIS — Z36.9 ANTENATAL SCREENING ENCOUNTER: ICD-10-CM

## 2022-04-12 DIAGNOSIS — Z36.2 ENCOUNTER FOR OTHER ANTENATAL SCREENING FOLLOW-UP: ICD-10-CM

## 2022-04-12 LAB
GLUCOSE UR STRIP-MCNC: NEGATIVE MG/DL
PROT UR STRIP-MCNC: NEGATIVE MG/DL

## 2022-04-12 PROCEDURE — 99214 OFFICE O/P EST MOD 30 MIN: CPT | Performed by: OBSTETRICS & GYNECOLOGY

## 2022-04-12 RX ORDER — ONDANSETRON 4 MG/1
4 TABLET, FILM COATED ORAL EVERY 8 HOURS PRN
Qty: 30 TABLET | Refills: 3 | Status: SHIPPED | OUTPATIENT
Start: 2022-04-12 | End: 2022-07-06 | Stop reason: HOSPADM

## 2022-04-12 NOTE — TELEPHONE ENCOUNTER
----- Message from Luis Alberto Asher MD sent at 4/12/2022  4:17 PM EDT -----  Ronda, She needs to see MFM at the [facility] for sub-optimal anatomy views (Obesity and twins). Order in epic and she is expecting your call. Thanks, Dr. Asher

## 2022-04-12 NOTE — PROGRESS NOTES
OB follow up     Beth Gaviria is a 22 y.o.  23w0d being seen today for her obstetrical visit.  Patient reports dizziness when taking Labetalol. Has been taking once a day, states her blood pressures have been normal without the medicice.  and nausea. Fetal movement: normal.    Her prenatal care is complicated by (and status): Prior , Di/Di twins, hx of Gest DM, Hx of pre-eclampsia/hypertension and obesity     Given labetalol last visit for concern with hypertension at home.   BP dropped too much and had dizzy spells, so will discontinue for now.     Review of Systems  Cramping/contractions : none   Vaginal bleeding: none   Fetal movement good x 2    /89   Wt 112 kg (248 lb)   LMP 10/01/2021   BMI 42.57 kg/m²     FHT: 156 and 144 BPM   Uterine Size: 24 cm       Assessment    Diagnoses and all orders for this visit:    1. Dichorionic diamniotic twin pregnancy in second trimester (Primary)  -     [facility]    2. High-risk pregnancy in second trimester    3. Encounter for other  screening follow-up  -     [facility]    4. Previous  delivery, antepartum    5. Maternal morbid obesity in second trimester, antepartum (HCC)  -     [facility]    6. History of gestational diabetes in prior pregnancy, currently pregnant in second trimester    7. History of pre-eclampsia in prior pregnancy, currently pregnant in second trimester    8.  screening encounter  -     CBC & Differential; Future  -     Gestational Screen 1 Hr ([facility]); Future    Other orders  -     ondansetron (ZOFRAN) 4 MG tablet; Take 1 tablet by mouth Every 8 (Eight) Hours As Needed for Nausea or Vomiting.  Dispense: 30 tablet; Refill: 3        1) pregnancy at 23w0d   Rh+, GTT/CBC ordered  2) Di/Di twins   Growth concordant.   Still anatomy sub-optimal - refer MFM to evaluate  3) Prior    Repeat planned at term   4) hx of pre-eclampsia  and recent concern for chronic HTN  Stopped labetalol as above.   Continue to monitor growth   And ASA 81 mg   5) Obesity   Overall and interval gain excellent.   Pre pregnancy BMI > 40         Plan    Reviewed this stage of pregnancy  Problem list updated   Follow up in 3 weeks    Luis Alberto Asher MD   4/12/2022  16:15 EDT

## 2022-04-26 ENCOUNTER — OFFICE VISIT (OUTPATIENT)
Dept: FAMILY MEDICINE CLINIC | Facility: CLINIC | Age: 22
End: 2022-04-26

## 2022-04-26 VITALS
WEIGHT: 251 LBS | SYSTOLIC BLOOD PRESSURE: 124 MMHG | OXYGEN SATURATION: 98 % | BODY MASS INDEX: 42.85 KG/M2 | DIASTOLIC BLOOD PRESSURE: 70 MMHG | HEIGHT: 64 IN | HEART RATE: 114 BPM

## 2022-04-26 DIAGNOSIS — R30.0 DYSURIA: Primary | ICD-10-CM

## 2022-04-26 PROBLEM — J30.2 SEASONAL ALLERGIES: Status: ACTIVE | Noted: 2020-01-27

## 2022-04-26 PROBLEM — Z86.32 H/O GESTATIONAL DIABETES MELLITUS, NOT CURRENTLY PREGNANT: Status: ACTIVE | Noted: 2020-01-27

## 2022-04-26 PROBLEM — F41.9 ANXIETY: Status: ACTIVE | Noted: 2020-01-27

## 2022-04-26 PROBLEM — E66.01 SEVERE OBESITY (BMI 35.0-39.9) WITH COMORBIDITY: Status: ACTIVE | Noted: 2020-01-27

## 2022-04-26 PROBLEM — F60.9 PERSONALITY DISORDER: Status: ACTIVE | Noted: 2020-01-27

## 2022-04-26 PROBLEM — R73.03 PREDIABETES: Status: ACTIVE | Noted: 2020-01-27

## 2022-04-26 PROBLEM — N92.0 MENORRHAGIA WITH REGULAR CYCLE: Status: ACTIVE | Noted: 2018-08-21

## 2022-04-26 PROBLEM — F32.A DEPRESSION: Status: ACTIVE | Noted: 2020-01-27

## 2022-04-26 LAB
BILIRUB BLD-MCNC: NEGATIVE MG/DL
CLARITY, POC: CLEAR
COLOR UR: ABNORMAL
GLUCOSE UR STRIP-MCNC: NEGATIVE MG/DL
KETONES UR QL: ABNORMAL
LEUKOCYTE EST, POC: NEGATIVE
NITRITE UR-MCNC: NEGATIVE MG/ML
PH UR: 6.5 [PH] (ref 5–8)
PROT UR STRIP-MCNC: NEGATIVE MG/DL
RBC # UR STRIP: NEGATIVE /UL
SP GR UR: 1.02 (ref 1–1.03)
UROBILINOGEN UR QL: ABNORMAL

## 2022-04-26 PROCEDURE — 99213 OFFICE O/P EST LOW 20 MIN: CPT | Performed by: NURSE PRACTITIONER

## 2022-04-26 NOTE — PATIENT INSTRUCTIONS
Return if symptoms worsen or fail to improve.  Patient advised to drink plenty of water  Patient advised to call OB/GYN if she develops any fever  Call with any questions or concerns.

## 2022-04-26 NOTE — PROGRESS NOTES
Subjective   Beth Gaviria is a 22 y.o. female.     History of Present Illness    Patient presents with dysuria that started about two days ago. She reports that she has burning with urination, patient reports that she feels as if she has to urinate, but can't. She reports blood in her urine at home and bilateral flank pain. She denies any fever. Patient is 25 weeks pregnant with twins. She reports that she spoke with her OB/GYN and was told to drink more water.     The following portions of the patient's history were reviewed and updated as appropriate: allergies, current medications, past family history, past medical history, past social history, past surgical history and problem list.    Review of Systems   Constitutional: Negative for chills, fatigue and fever.   Respiratory: Negative for cough, chest tightness, shortness of breath and wheezing.    Cardiovascular: Negative for chest pain, palpitations and leg swelling.   Genitourinary: Positive for dysuria, flank pain and frequency. Negative for hematuria.   Neurological: Negative for dizziness, weakness and headache.   Hematological: Negative.    Psychiatric/Behavioral: Negative for agitation, behavioral problems and hallucinations.       Objective   Physical Exam  Vitals and nursing note reviewed.   Constitutional:       Appearance: Normal appearance. She is well-developed. She is obese.   HENT:      Head: Normocephalic and atraumatic.      Right Ear: External ear normal.      Left Ear: External ear normal.      Nose: Nose normal.   Eyes:      Conjunctiva/sclera: Conjunctivae normal.      Pupils: Pupils are equal, round, and reactive to light.   Neck:      Thyroid: No thyromegaly.   Cardiovascular:      Rate and Rhythm: Normal rate and regular rhythm.      Heart sounds: Normal heart sounds. No murmur heard.  Pulmonary:      Effort: Pulmonary effort is normal.      Breath sounds: Normal breath sounds.   Abdominal:      General: Bowel sounds are normal. There is  no distension.      Palpations: Abdomen is soft.      Tenderness: There is no abdominal tenderness. There is no right CVA tenderness or left CVA tenderness.   Musculoskeletal:         General: No deformity. Normal range of motion.      Cervical back: Normal range of motion and neck supple.   Lymphadenopathy:      Cervical: No cervical adenopathy.   Skin:     General: Skin is warm and dry.   Neurological:      Mental Status: She is alert and oriented to person, place, and time.      Cranial Nerves: No cranial nerve deficit.   Psychiatric:         Behavior: Behavior normal.         Thought Content: Thought content normal.         Judgment: Judgment normal.         Vitals:    04/26/22 1436   BP: 124/70   Pulse: 114   SpO2: 98%     Body mass index is 43.08 kg/m².    Procedures    Assessment/Plan   Problems Addressed this Visit    None     Visit Diagnoses     Dysuria    -  Primary    Relevant Orders    POC Urinalysis Dipstick    Urine Culture - Urine, Urine, Clean Catch      Diagnoses       Codes Comments    Dysuria    -  Primary ICD-10-CM: R30.0  ICD-9-CM: 788.1         POCT Urinalysis  Urine culture  Patient advised to drink plenty of water  Patient advised to call OB/GYN if she develops any fever       Return if symptoms worsen or fail to improve.

## 2022-04-28 ENCOUNTER — HOSPITAL ENCOUNTER (OUTPATIENT)
Dept: ULTRASOUND IMAGING | Facility: HOSPITAL | Age: 22
Discharge: HOME | End: 2022-04-28
Admitting: OBSTETRICS & GYNECOLOGY

## 2022-04-28 ENCOUNTER — TRANSCRIBE ORDERS (OUTPATIENT)
Dept: ULTRASOUND IMAGING | Facility: HOSPITAL | Age: 22
End: 2022-04-28

## 2022-04-28 ENCOUNTER — OFFICE VISIT (OUTPATIENT)
Dept: OBSTETRICS AND GYNECOLOGY | Facility: CLINIC | Age: 22
End: 2022-04-28

## 2022-04-28 VITALS
HEART RATE: 110 BPM | SYSTOLIC BLOOD PRESSURE: 123 MMHG | DIASTOLIC BLOOD PRESSURE: 87 MMHG | TEMPERATURE: 97.8 F | WEIGHT: 251 LBS | BODY MASS INDEX: 43.08 KG/M2

## 2022-04-28 DIAGNOSIS — O30.042 DICHORIONIC DIAMNIOTIC TWIN PREGNANCY IN SECOND TRIMESTER: Primary | ICD-10-CM

## 2022-04-28 LAB
BACTERIA UR CULT: NORMAL
BACTERIA UR CULT: NORMAL

## 2022-04-28 PROCEDURE — 76811 OB US DETAILED SNGL FETUS: CPT

## 2022-04-28 PROCEDURE — 76812 OB US DETAILED ADDL FETUS: CPT

## 2022-04-28 PROCEDURE — 76812 OB US DETAILED ADDL FETUS: CPT | Performed by: OBSTETRICS & GYNECOLOGY

## 2022-04-28 PROCEDURE — 76811 OB US DETAILED SNGL FETUS: CPT | Performed by: OBSTETRICS & GYNECOLOGY

## 2022-04-28 NOTE — PROGRESS NOTES
"Pt reports that she is doing well and denies vaginal bleeding, cramping, contractions, LOF. Reports active fetal movement x2. Denies HA, visual changes or epigastric pain. Notes that she has been having irregular tightness in her abdomen \"the whole pregnancy. Next appointment with Dr. Asher is scheduled for 5/6.    Vitals:    04/28/22 0915   BP: 123/87   Pulse: 110   Temp: 97.8 °F (36.6 °C)        "

## 2022-04-28 NOTE — PROGRESS NOTES
Beth is 25 weeks and 2 days per assigned dates.  However, we have not done an ultrasound on her earlier in this pregnancy.  The fetuses measure concordant and symmetrically larger than assigned gestational age.  Amniotic fluid volume is normal x 2.  Patient expressed discomfort on examination table that prevented full evaluation of fetal anatomy in this twin gestation.  We will place the patient on left lateral position From the beginning in next exam to prevent supine hypotension syndrome.  This is best achieved by rolling towels and placing it under the patient's HIP (not under the soft tissue of the flank) either on the right or the left side.    As you know, twin pregnancies have a 40% risk of developing preeclampsia and a 50% chance of delivering .    Please reexamine your earlier evaluations for better gestational age assessment. I would recommend repeat fetal growth and anatomy ultrasound in 4 weeks.    Devon Canela MD  [facility], [place], [place]  2022  12:08 EDT  [phone]

## 2022-05-05 ENCOUNTER — TELEPHONE (OUTPATIENT)
Dept: OBSTETRICS AND GYNECOLOGY | Facility: CLINIC | Age: 22
End: 2022-05-05

## 2022-05-05 NOTE — TELEPHONE ENCOUNTER
----- Message from Galilea Acosta MA sent at 5/4/2022  4:13 PM EDT -----  L/m for pt/mello  ----- Message -----  From: Luis Alberto Asher MD  Sent: 5/4/2022   3:43 PM EDT  To: ABEL De Leon, needing to schedule OB follow up. Thanks, Dr. Asher

## 2022-05-13 ENCOUNTER — ROUTINE PRENATAL (OUTPATIENT)
Dept: OBSTETRICS AND GYNECOLOGY | Facility: CLINIC | Age: 22
End: 2022-05-13

## 2022-05-13 VITALS — DIASTOLIC BLOOD PRESSURE: 85 MMHG | BODY MASS INDEX: 43.77 KG/M2 | SYSTOLIC BLOOD PRESSURE: 139 MMHG | WEIGHT: 255 LBS

## 2022-05-13 DIAGNOSIS — O34.219 PREVIOUS CESAREAN DELIVERY, ANTEPARTUM: ICD-10-CM

## 2022-05-13 DIAGNOSIS — O99.212 MATERNAL MORBID OBESITY IN SECOND TRIMESTER, ANTEPARTUM: ICD-10-CM

## 2022-05-13 DIAGNOSIS — O09.292 HISTORY OF PRE-ECLAMPSIA IN PRIOR PREGNANCY, CURRENTLY PREGNANT IN SECOND TRIMESTER: ICD-10-CM

## 2022-05-13 DIAGNOSIS — O09.92 HIGH-RISK PREGNANCY IN SECOND TRIMESTER: Primary | ICD-10-CM

## 2022-05-13 DIAGNOSIS — O30.042 DICHORIONIC DIAMNIOTIC TWIN PREGNANCY IN SECOND TRIMESTER: ICD-10-CM

## 2022-05-13 DIAGNOSIS — Z86.32 HISTORY OF GESTATIONAL DIABETES IN PRIOR PREGNANCY, CURRENTLY PREGNANT IN SECOND TRIMESTER: ICD-10-CM

## 2022-05-13 DIAGNOSIS — E66.01 MATERNAL MORBID OBESITY IN SECOND TRIMESTER, ANTEPARTUM: ICD-10-CM

## 2022-05-13 DIAGNOSIS — O09.292 HISTORY OF GESTATIONAL DIABETES IN PRIOR PREGNANCY, CURRENTLY PREGNANT IN SECOND TRIMESTER: ICD-10-CM

## 2022-05-13 LAB
GLUCOSE UR STRIP-MCNC: NEGATIVE MG/DL
PROT UR STRIP-MCNC: NEGATIVE MG/DL

## 2022-05-13 PROCEDURE — 99214 OFFICE O/P EST MOD 30 MIN: CPT | Performed by: OBSTETRICS & GYNECOLOGY

## 2022-05-13 RX ORDER — METHYLERGONOVINE MALEATE 0.2 MG/ML
200 INJECTION INTRAVENOUS ONCE AS NEEDED
Status: CANCELLED | OUTPATIENT
Start: 2022-05-13

## 2022-05-13 RX ORDER — AMOXICILLIN 500 MG/1
CAPSULE ORAL
COMMUNITY
Start: 2022-05-08 | End: 2022-05-23

## 2022-05-13 RX ORDER — CARBOPROST TROMETHAMINE 250 UG/ML
250 INJECTION, SOLUTION INTRAMUSCULAR AS NEEDED
Status: CANCELLED | OUTPATIENT
Start: 2022-05-13

## 2022-05-13 RX ORDER — SODIUM CHLORIDE 0.9 % (FLUSH) 0.9 %
3 SYRINGE (ML) INJECTION EVERY 12 HOURS SCHEDULED
Status: CANCELLED | OUTPATIENT
Start: 2022-05-13

## 2022-05-13 RX ORDER — MISOPROSTOL 100 UG/1
800 TABLET ORAL AS NEEDED
Status: CANCELLED | OUTPATIENT
Start: 2022-05-13

## 2022-05-13 RX ORDER — LIDOCAINE HYDROCHLORIDE 10 MG/ML
5 INJECTION, SOLUTION EPIDURAL; INFILTRATION; INTRACAUDAL; PERINEURAL AS NEEDED
Status: CANCELLED | OUTPATIENT
Start: 2022-05-13

## 2022-05-13 RX ORDER — SODIUM CHLORIDE 0.9 % (FLUSH) 0.9 %
10 SYRINGE (ML) INJECTION AS NEEDED
Status: CANCELLED | OUTPATIENT
Start: 2022-05-13

## 2022-05-13 NOTE — PROGRESS NOTES
OB follow up     Beth Gaviria is a 22 y.o.  27w3d being seen today for her obstetrical visit.  Patient reports no complaints. Fetal movement: normal.    Her prenatal care is complicated by (and status): Prior , DI/Di twins, Hx of Gest DM, Hypertension/per-eclampsia and maternal obesity (pre pregnancy BMI > 40)     Review of Systems  Cramping/contractions : none   Vaginal bleeding: none   Fetal movement good x 2    /85   Wt 116 kg (255 lb)   LMP 10/01/2021   BMI 43.77 kg/m²     FHT: 134 and 156 BPM   Uterine Size: size equals dates       Assessment    Diagnoses and all orders for this visit:    1. High-risk pregnancy in second trimester (Primary)    2. Dichorionic diamniotic twin pregnancy in second trimester  -     Case Request; Standing  -     COVID PRE-OP / PRE-PROCEDURE SCREENING ORDER (NO ISOLATION) - Swab, Nasopharynx; Future  -     Case Request    3. Previous  delivery, antepartum  -     Case Request; Standing  -     COVID PRE-OP / PRE-PROCEDURE SCREENING ORDER (NO ISOLATION) - Swab, Nasopharynx; Future  -     Case Request    4. Maternal morbid obesity in second trimester, antepartum (HCC)    5. History of gestational diabetes in prior pregnancy, currently pregnant in second trimester    6. History of pre-eclampsia in prior pregnancy, currently pregnant in second trimester        1) pregnancy at 27w3d   Rh+, BS and anemia screen today   Peds, prenatal classes and tours  TDaP and COVID recommended  Encouraged questions about L&D, anesthesia, breast feeding and birth control  2) Di/Di twins   Growth macrosomia with MFM   Did CONFIRM DATES AS per MFM request (dated by 7 week ultrasound, these are accurate per ACOG!)   Growth upcoming with MFM on  per patient.   She saw some pyelectasis reported, discussed - assume will follow up then   3) Prior    Set repeat for 38 weeks   Currently 22, but may move up with HTN, other concerns   4) Hx of Gest DM (1 hour today)  Hx  of HTN/pre-eclampsia  BP good and on ASA  5) Obesity with overall and interval gain reasonable.   Pre pregnancy BMI > 40           Plan    Reviewed this stage of pregnancy  Problem list updated   Follow up in 2 weeks    Luis Alberto Asher MD   5/13/2022  11:00 EDT

## 2022-05-16 ENCOUNTER — TELEPHONE (OUTPATIENT)
Dept: OBSTETRICS AND GYNECOLOGY | Facility: CLINIC | Age: 22
End: 2022-05-16

## 2022-05-16 PROBLEM — O30.042 DICHORIONIC DIAMNIOTIC TWIN PREGNANCY IN SECOND TRIMESTER: Status: ACTIVE | Noted: 2022-05-16

## 2022-05-16 PROBLEM — O34.219 PREVIOUS CESAREAN DELIVERY, ANTEPARTUM: Status: ACTIVE | Noted: 2022-05-16

## 2022-05-16 RX ORDER — GLUCOSAMINE HCL/CHONDROITIN SU 500-400 MG
1 CAPSULE ORAL 4 TIMES DAILY
Qty: 120 EACH | Refills: 3 | Status: SHIPPED | OUTPATIENT
Start: 2022-05-16 | End: 2022-07-14

## 2022-05-16 RX ORDER — BLOOD-GLUCOSE METER
1 KIT MISCELLANEOUS 4 TIMES DAILY
Qty: 1 EACH | Refills: 0 | Status: SHIPPED | OUTPATIENT
Start: 2022-05-16 | End: 2022-05-23

## 2022-05-16 RX ORDER — LANCETS 30 GAUGE
1 EACH MISCELLANEOUS 4 TIMES DAILY
Qty: 120 EACH | Refills: 3 | Status: SHIPPED | OUTPATIENT
Start: 2022-05-16 | End: 2022-07-08

## 2022-05-16 NOTE — TELEPHONE ENCOUNTER
Patient aware of gestational results and wants to wait until next appointment with provider for gestational diabetes consult.     Thank you

## 2022-05-16 NOTE — TELEPHONE ENCOUNTER
----- Message from Luis Alberto Asher MD sent at 5/16/2022 12:45 PM EDT -----  Galilea, notes in Epic suggest she is aware. But did not want sooner follow up. Fine as long as she is 1) Starting to track and keep records on her blood sugars daily - fasting and 2 hours after breakfast, lunch and dinner - NEEDS to bring with her to that next visit, 2) Watching her diet, especially trying to avoid sweats and being consistent with her carbohydrates, fats and proteins. If she can do that and we have data to review, I am okay to wait. Otherwise needs to be seen! Thanks, Dr. Asher

## 2022-05-16 NOTE — TELEPHONE ENCOUNTER
I spoke to pt, she is aware of what to do. She would like to keep appt. For the 26th and discuss. She will keep a log and bring with her to her appointments.    Galilea

## 2022-05-23 ENCOUNTER — TELEPHONE (OUTPATIENT)
Dept: OBSTETRICS AND GYNECOLOGY | Facility: CLINIC | Age: 22
End: 2022-05-23

## 2022-05-23 ENCOUNTER — HOSPITAL ENCOUNTER (OUTPATIENT)
Facility: HOSPITAL | Age: 22
Setting detail: OBSERVATION
Discharge: HOME | End: 2022-05-23
Attending: OBSTETRICS & GYNECOLOGY | Admitting: OBSTETRICS & GYNECOLOGY

## 2022-05-23 ENCOUNTER — ROUTINE PRENATAL (OUTPATIENT)
Dept: OBSTETRICS AND GYNECOLOGY | Facility: CLINIC | Age: 22
End: 2022-05-23

## 2022-05-23 VITALS — SYSTOLIC BLOOD PRESSURE: 127 MMHG | WEIGHT: 254 LBS | BODY MASS INDEX: 43.6 KG/M2 | DIASTOLIC BLOOD PRESSURE: 82 MMHG

## 2022-05-23 VITALS
RESPIRATION RATE: 18 BRPM | SYSTOLIC BLOOD PRESSURE: 117 MMHG | DIASTOLIC BLOOD PRESSURE: 63 MMHG | OXYGEN SATURATION: 100 % | BODY MASS INDEX: 43.6 KG/M2 | HEART RATE: 112 BPM | HEIGHT: 64 IN | TEMPERATURE: 98.2 F

## 2022-05-23 DIAGNOSIS — O34.219 PREVIOUS CESAREAN DELIVERY, ANTEPARTUM: ICD-10-CM

## 2022-05-23 DIAGNOSIS — O09.93 HIGH-RISK PREGNANCY IN THIRD TRIMESTER: Primary | ICD-10-CM

## 2022-05-23 DIAGNOSIS — E66.01 MATERNAL MORBID OBESITY IN THIRD TRIMESTER, ANTEPARTUM: ICD-10-CM

## 2022-05-23 DIAGNOSIS — O99.213 MATERNAL MORBID OBESITY IN THIRD TRIMESTER, ANTEPARTUM: ICD-10-CM

## 2022-05-23 DIAGNOSIS — O47.03 PRETERM UTERINE CONTRACTIONS IN THIRD TRIMESTER, ANTEPARTUM: ICD-10-CM

## 2022-05-23 DIAGNOSIS — O24.419 GESTATIONAL DIABETES MELLITUS (GDM) IN THIRD TRIMESTER, GESTATIONAL DIABETES METHOD OF CONTROL UNSPECIFIED: ICD-10-CM

## 2022-05-23 DIAGNOSIS — O30.043 DICHORIONIC DIAMNIOTIC TWIN PREGNANCY IN THIRD TRIMESTER: ICD-10-CM

## 2022-05-23 PROBLEM — Z34.90 PREGNANCY: Status: ACTIVE | Noted: 2022-05-23

## 2022-05-23 LAB
GLUCOSE UR STRIP-MCNC: NEGATIVE MG/DL
PROT UR STRIP-MCNC: ABNORMAL MG/DL

## 2022-05-23 PROCEDURE — 99214 OFFICE O/P EST MOD 30 MIN: CPT | Performed by: OBSTETRICS & GYNECOLOGY

## 2022-05-23 PROCEDURE — G0378 HOSPITAL OBSERVATION PER HR: HCPCS

## 2022-05-23 PROCEDURE — 63710000001 ONDANSETRON ODT 4 MG TABLET DISPERSIBLE: Performed by: OBSTETRICS & GYNECOLOGY

## 2022-05-23 PROCEDURE — 59025 FETAL NON-STRESS TEST: CPT | Performed by: OBSTETRICS & GYNECOLOGY

## 2022-05-23 PROCEDURE — 59025 FETAL NON-STRESS TEST: CPT

## 2022-05-23 RX ORDER — OFLOXACIN 3 MG/ML
SOLUTION AURICULAR (OTIC)
COMMUNITY
Start: 2022-05-19 | End: 2022-06-01

## 2022-05-23 RX ORDER — ONDANSETRON 4 MG/1
4 TABLET, FILM COATED ORAL ONCE
Status: DISCONTINUED | OUTPATIENT
Start: 2022-05-23 | End: 2022-05-23

## 2022-05-23 RX ORDER — BLOOD-GLUCOSE METER
1 KIT MISCELLANEOUS 4 TIMES DAILY
COMMUNITY
Start: 2022-05-16 | End: 2022-07-08

## 2022-05-23 RX ORDER — ONDANSETRON 4 MG/1
4 TABLET, ORALLY DISINTEGRATING ORAL ONCE
Status: COMPLETED | OUTPATIENT
Start: 2022-05-23 | End: 2022-05-23

## 2022-05-23 RX ADMIN — ONDANSETRON 4 MG: 4 TABLET, ORALLY DISINTEGRATING ORAL at 18:00

## 2022-05-23 NOTE — DISCHARGE INSTRUCTIONS
Return to L&D for evaluation if your symptoms get worse: frequent painful contractions, leaking fluid, vaginal bleeding or decreased fetal movement.  Keep your appointment with Dr Asher on Friday, or sooner if you feel like its necessary. In the next few days, continue to hydrate and limit your activity.

## 2022-05-23 NOTE — NURSING NOTE
Discussed POC with patient. Zofran given, will DC home when nausea improved. Patient states she would rather just go home now. Discharge instructions reviewed with patient. Questions answered. DC home.

## 2022-05-23 NOTE — TELEPHONE ENCOUNTER
Pt not sure if water broke or what is going on. She said she is not sure if she is having watery discharge but she felt like she peed herself and there is no odor. She says she is cramping, but is not sure if this is contractions since she does not know what they feel like. She says the first one was 5 mins apart and then the second one was 8 mins apart. Please advise.    Thanks,  Davy

## 2022-05-23 NOTE — TELEPHONE ENCOUNTER
Davy,     Should be seen in the office.   If contractions more intense and get closer and uncomfortable to to OB ED. Otherwise seen today in office.     Thanks,   Dr. Asher

## 2022-05-23 NOTE — PROGRESS NOTES
OB follow up     Beth Gaviria is a 22 y.o.  28w6d being seen today for her obstetrical visit.  Patient reports possibly leaking fluid since last night and cramping. . Fetal movement: normal.    Initial B/P taken on pt's R wrist, repeat B/P 127/82 on L arm.     Her prenatal care is complicated by (and status): Prior , Di/Di twins, Gest DM, hx of hypertension/pre-eclampsia and maternal obesity (pre-pregnancy BMI > 40)    Started to get uncomfortable last night  Hayley intermittent, but feels they are closer and more intense this morning  Had gush last night and this morning.   Has felt as frequent as every 12-20 minutes     Review of Systems  Cramping/contractions : see above   Vaginal bleeding: none  Fetal movement good x 2    /82   Wt 115 kg (254 lb)   LMP 10/01/2021   BMI 43.60 kg/m²     FHT: 145 and 156 BPM   Uterine Size: 31 cm   Cx ft/50/-2, Speculum - No pooling, nitrazine negative.     Assessment    Diagnoses and all orders for this visit:    1. High-risk pregnancy in third trimester (Primary)    2.  uterine contractions in third trimester, antepartum  -     Fetal Fibronectin - Vaginal Fluid, Cervix  -     Urine Culture - , Urine, Clean Catch  -     US Ob Transvaginal    3. Dichorionic diamniotic twin pregnancy in third trimester    4. Previous  delivery, antepartum    5. Maternal morbid obesity in third trimester, antepartum (HCC)    6. Gestational diabetes mellitus (GDM) in third trimester, gestational diabetes method of control unspecified        1) pregnancy at 28w6d   2)  contractions   New onset  Check FFN, cervical length  Consider evaluation on L&D to see pattern and need to treat from there  PTL warnings stressed.   Send urine for culture as well.   3) Gest DM  New onset last week or so.   Now checking BS   Fasting 86-95   After meals 129   So need to review log, may need some minor medication   Still trying to build data  4) Di/Di twins   MFM  following, LGA likely from gest. DM  5) prior    Repeat planned at term   6) hypertension, hx of pre-eclampsia   Blood pressure better on recheck, but can add labs at hospital of needed.   7) obesity   See prior discussions  Overall gain good.         Plan    Reviewed this stage of pregnancy  Problem list updated   Consider inpatient evaluation     Luis Alberto Asher MD   2022  12:49 EDT

## 2022-05-24 LAB — FIBRONECTIN FETAL VAG QL: NEGATIVE

## 2022-05-25 ENCOUNTER — TELEPHONE (OUTPATIENT)
Dept: OBSTETRICS AND GYNECOLOGY | Facility: CLINIC | Age: 22
End: 2022-05-25

## 2022-05-25 NOTE — TELEPHONE ENCOUNTER
----- Message from Galilea Acosta MA sent at 5/25/2022 11:59 AM EDT -----  L/m for pt/mello  ----- Message -----  From: Luis Alberto Asher MD  Sent: 5/24/2022   8:45 AM EDT  To: ABEL De Leon, good news. Her FFN is negative, so that decreases the chance this is labor related. Please let her know, still needs to watch for worsening and go back to L&D if an issue. Thanks, Dr. Asher

## 2022-05-25 NOTE — TELEPHONE ENCOUNTER
Caller: OCTAVIANO PASCUAL    Relationship: SELF    Best call back number:[phone]    Do you require a callback: YES    ARTURO PASCUAL RETURNED CALL FROM ZAYDA. PLEASE CALL PT BACK [phone].

## 2022-05-25 NOTE — TELEPHONE ENCOUNTER
Pt is aware.  Cramping is still consistent. Advised to monitor and follow Dr. Lb kwok.     Galilea

## 2022-05-26 RX ORDER — NITROFURANTOIN 25; 75 MG/1; MG/1
100 CAPSULE ORAL 2 TIMES DAILY
Qty: 14 CAPSULE | Refills: 0 | Status: SHIPPED | OUTPATIENT
Start: 2022-05-26 | End: 2022-06-02

## 2022-05-27 ENCOUNTER — TRANSCRIBE ORDERS (OUTPATIENT)
Dept: ULTRASOUND IMAGING | Facility: HOSPITAL | Age: 22
End: 2022-05-27

## 2022-05-27 ENCOUNTER — OFFICE VISIT (OUTPATIENT)
Dept: OBSTETRICS AND GYNECOLOGY | Facility: CLINIC | Age: 22
End: 2022-05-27

## 2022-05-27 ENCOUNTER — TELEPHONE (OUTPATIENT)
Dept: OBSTETRICS AND GYNECOLOGY | Facility: CLINIC | Age: 22
End: 2022-05-27

## 2022-05-27 ENCOUNTER — ROUTINE PRENATAL (OUTPATIENT)
Dept: OBSTETRICS AND GYNECOLOGY | Facility: CLINIC | Age: 22
End: 2022-05-27

## 2022-05-27 ENCOUNTER — HOSPITAL ENCOUNTER (OUTPATIENT)
Dept: ULTRASOUND IMAGING | Facility: HOSPITAL | Age: 22
Discharge: HOME | End: 2022-05-27
Admitting: OBSTETRICS & GYNECOLOGY

## 2022-05-27 VITALS
WEIGHT: 257.4 LBS | SYSTOLIC BLOOD PRESSURE: 133 MMHG | HEART RATE: 113 BPM | BODY MASS INDEX: 44.18 KG/M2 | DIASTOLIC BLOOD PRESSURE: 84 MMHG | TEMPERATURE: 97.8 F

## 2022-05-27 VITALS — DIASTOLIC BLOOD PRESSURE: 91 MMHG | WEIGHT: 257 LBS | SYSTOLIC BLOOD PRESSURE: 144 MMHG | BODY MASS INDEX: 44.11 KG/M2

## 2022-05-27 DIAGNOSIS — E66.01 MORBID OBESITY WITH BMI OF 40.0-44.9, ADULT: Primary | ICD-10-CM

## 2022-05-27 DIAGNOSIS — O24.415 GESTATIONAL DIABETES MELLITUS (GDM) CONTROLLED ON ORAL HYPOGLYCEMIC DRUG, ANTEPARTUM: ICD-10-CM

## 2022-05-27 DIAGNOSIS — O30.043 DICHORIONIC DIAMNIOTIC TWIN PREGNANCY IN THIRD TRIMESTER: ICD-10-CM

## 2022-05-27 DIAGNOSIS — O47.03 PRETERM UTERINE CONTRACTIONS IN THIRD TRIMESTER, ANTEPARTUM: ICD-10-CM

## 2022-05-27 DIAGNOSIS — O30.042 DICHORIONIC DIAMNIOTIC TWIN PREGNANCY IN SECOND TRIMESTER: ICD-10-CM

## 2022-05-27 DIAGNOSIS — O30.042 DICHORIONIC DIAMNIOTIC TWIN PREGNANCY IN SECOND TRIMESTER: Primary | ICD-10-CM

## 2022-05-27 DIAGNOSIS — O24.415 GESTATIONAL DIABETES MELLITUS (GDM) IN THIRD TRIMESTER CONTROLLED ON ORAL HYPOGLYCEMIC DRUG: ICD-10-CM

## 2022-05-27 DIAGNOSIS — E66.01 MATERNAL MORBID OBESITY IN THIRD TRIMESTER, ANTEPARTUM: ICD-10-CM

## 2022-05-27 DIAGNOSIS — O09.93 HIGH-RISK PREGNANCY IN THIRD TRIMESTER: Primary | ICD-10-CM

## 2022-05-27 DIAGNOSIS — O10.013 PRE-EXISTING ESSENTIAL HYPERTENSION DURING PREGNANCY IN THIRD TRIMESTER: ICD-10-CM

## 2022-05-27 DIAGNOSIS — O99.213 MATERNAL MORBID OBESITY IN THIRD TRIMESTER, ANTEPARTUM: ICD-10-CM

## 2022-05-27 DIAGNOSIS — O34.219 PREVIOUS CESAREAN DELIVERY, ANTEPARTUM: ICD-10-CM

## 2022-05-27 PROBLEM — O30.049 TWIN GESTATION, DICHORIONIC/DIAMNIOTIC (TWO PLACENTAE, TWO AMNIOTIC SACS): Status: ACTIVE | Noted: 2022-05-27

## 2022-05-27 LAB
BACTERIA UR CULT: ABNORMAL
GLUCOSE UR STRIP-MCNC: NEGATIVE MG/DL
OTHER ANTIBIOTIC SUSC ISLT: ABNORMAL
PROT UR STRIP-MCNC: ABNORMAL MG/DL

## 2022-05-27 PROCEDURE — 76816 OB US FOLLOW-UP PER FETUS: CPT | Performed by: OBSTETRICS & GYNECOLOGY

## 2022-05-27 PROCEDURE — 99214 OFFICE O/P EST MOD 30 MIN: CPT | Performed by: OBSTETRICS & GYNECOLOGY

## 2022-05-27 PROCEDURE — 76816 OB US FOLLOW-UP PER FETUS: CPT

## 2022-05-27 PROCEDURE — 99213 OFFICE O/P EST LOW 20 MIN: CPT | Performed by: OBSTETRICS & GYNECOLOGY

## 2022-05-27 RX ORDER — GLYBURIDE 2.5 MG/1
2.5 TABLET ORAL 2 TIMES DAILY WITH MEALS
Qty: 30 TABLET | Refills: 3 | Status: SHIPPED | OUTPATIENT
Start: 2022-05-27 | End: 2022-07-08

## 2022-05-27 NOTE — PROGRESS NOTES
Beth Gaviria is a 22 y.o. female,  Patient's last menstrual period was 10/01/2021. EDC of 2022, by Ultrasound now 29w3d weeks seen for F/U as requested by Luis Alberto Asher MD secondary to:    1) Diamniotic-dichorionic twin gestation  2) Morbid obesity - BMI - 43.60  3) Newly diagnosed GDM  4) Polyhydramnios found on BPP today    Vitals:    22 1313   BP: 133/84   Pulse: 113   Temp: 97.8 °F (36.6 °C)     Patient is doing well and is without complaints.  She relayed that she was just diagnosed with GDM and has not seen the DM Educator.    Ultrasound images and findings reviewed with patient.  Diamniotic-Dichorionic Twin gestation.  Dividing membrane seen.    Twin A:  Polyhydramnios is present.  Appropriate interval growth is noted although AC is at the 98%.  No fetal anomalies were identified.     Twin B:    Normal amniotic fluid.  Appropriate interval growth is noted. No fetal anomalies were identified.     POLYHYDRAMNIOS:    General counseling was then performed regarding polyhydramnios.  Congenital malformations, Fetal Hydrops, poorly controlled Diabetes and idiopathic polyhydramnios were discussed.  Approximately 50 % of cases are idiopathic.  Polyhydramnios greater than 25 cm (2-3%) and greater than 30 cm (4-6%) have been associated with chromosomal abnormalities.  The risks and benefits of amniocentesis including rupture of membranes, infection and fetal loss were discussed.  Polyhydramnios is also associated with increased  morbidity and mortality.  A description of and the role of Biophysical Profile testing were discussed.      GESTATIONAL DIABETES:    General counseling regarding poorly controlled Gestational Diabetes (DM) and the increased risks for fetal macrosomia and birth trauma,  morbidity and mortality,  hypoglycemia/hyperbilirubinemia and delayed lung maturity was performed.  The roles of serial U/S for growth and close A-P surveillance beginning in the  3rd trimester as well as a description of Biophysical Profile testing were discussed as well.  QID blood glucose testing should be performed. FBS less than 95 mg % for and 2 Hr PP < 120 mg % should be maintained.  Insulin requiring patients should not go beyond their due date.  A 2 hr 75 gm GTT should be performed six weeks postpartum.    ASSESSMENT:     1) Diamniotic-dichorionic twin gestation  2) Morbid obesity - BMI - 43.60  3) Newly diagnosed GDM  4) Polyhydramnios found on BPP today    Questions answered.    Recommendations:    1) Weekly BPP beginning @ 32 weeks  2) Serial U/S every 4 weeks for fetal growth      Total time spent TODAY on this encounter, including pre-visit review of separately obtained history, face-to-face interaction performing medically appropriate physical exam, patient counseling/education, interpretation of diagnostic results, care coordination and documentation was 25 minutes.      Thank you for utilizing our ultrasound and consultative services.  If I may be of any further service, please do not hesitate to contact me.    Sincerely,    Dio Islas MD  Maternal-Fetal Medicine

## 2022-05-27 NOTE — PROGRESS NOTES
"Pt denies vaginal bleeding or LOF at this time. Reports active fetal movement x2. Beth reports that her ctxs/cramping started 5/23. Notes that she was seen and assessed on L&D for ctxs. Last OB appointment with Dr. Asher was today and Beth informed OB she was still have the same abdominal cramping. Pt reports her BP was elevated in the OB office today. Beth notes that she takes her BP at home once a day and this week it has been elevated around 140/90s. Prior to this week her \"normal\" BP was reading 110s-120s/80s. Reports that she has been seeing spots in her vision x2 weeks. Notes increased swelling in her ankles. Denies HA or epigastric pain. Beth reports that Dr. Asher is managing her GDM and she brings in glucose logs to her OB appointments. Next OB appointment is scheduled for 6/6.    Vitals:    05/27/22 1313   BP: 133/84   Pulse: 113   Temp: 97.8 °F (36.6 °C)        "

## 2022-05-27 NOTE — PROGRESS NOTES
OB follow up     Beth Gaviria is a 22 y.o.  29w3d being seen today for her obstetrical visit.  Patient reports still cramping consistently. . Fetal movement: normal.    Her prenatal care is complicated by (and status): Prior , Di/Di twins, Gest DM, Hypertension in pregnancy and maternal obesity     Recent issue with contractions, seen in office - sent to L&D  Not in labor and sent home      Review of Systems  Cramping/contractions : as above   Vaginal bleeding: none   Fetal movement good x 2    /91   Wt 117 kg (257 lb)   LMP 10/01/2021   BMI 44.11 kg/m²     FHT: 134 and 144 BPM   Uterine Size: 34 cm       Assessment    Diagnoses and all orders for this visit:    1. High-risk pregnancy in third trimester (Primary)    2. Dichorionic diamniotic twin pregnancy in third trimester    3.  uterine contractions in third trimester, antepartum    4. Maternal morbid obesity in third trimester, antepartum (HCC)    5. Previous  delivery, antepartum    6. Gestational diabetes mellitus (GDM) in third trimester controlled on oral hypoglycemic drug    7. Pre-existing essential hypertension during pregnancy in third trimester  -     CBC & Differential  -     Comprehensive Metabolic Panel  -     Protein / Creatinine Ratio, Urine - Urine, Clean Catch    Other orders  -     glyburide (DIAbeta) 2.5 MG tablet; Take 1 tablet by mouth 2 (Two) Times a Day With Meals.  Dispense: 30 tablet; Refill: 3        1) pregnancy at 29w3d   2)  contractions   FFN was negative - cx length 2.44 cm   Urine culture with growth, confirmation ID, Sensitivity pending- started iron    labor warnings stressed again   3) Gest DM   Reports BS the same.  Start glyburide BID to see if helps   4) Di/Di twins MFM following   Macrosomia on growth   Scheduled with them today   5) Hypertension   Chronic in nature, repeat baseline labs  PIH warnings reviewed.   Consider start BP med in near future if continues up.   6)  prior    Repeat at term planned   7) Obesity   Reviewed again, continue to monitor       Plan    Reviewed this stage of pregnancy  Problem list updated   Follow up in 2 weeks    Luis Alberto Asher MD   2022  12:22 EDT

## 2022-05-27 NOTE — TELEPHONE ENCOUNTER
----- Message from Luis Alberto Asher MD sent at 5/26/2022 10:50 AM EDT -----  Galilea, her urine culture - currently suspect UTI (growth seen). Awaiting confirmation of pathogen, ID and sensitivity. Starting antibiotic, Rx sent to pharmacy. Thanks, Dr. Asher

## 2022-05-28 LAB
ALBUMIN SERPL-MCNC: 3.8 G/DL (ref 3.9–5)
ALBUMIN/GLOB SERPL: 1.2 {RATIO} (ref 1.2–2.2)
ALP SERPL-CCNC: 126 IU/L (ref 44–121)
ALT SERPL-CCNC: 10 IU/L (ref 0–32)
AST SERPL-CCNC: 15 IU/L (ref 0–40)
BASOPHILS # BLD AUTO: 0 X10E3/UL (ref 0–0.2)
BASOPHILS NFR BLD AUTO: 0 %
BILIRUB SERPL-MCNC: 0.4 MG/DL (ref 0–1.2)
BUN SERPL-MCNC: 4 MG/DL (ref 6–20)
BUN/CREAT SERPL: 8 (ref 9–23)
CALCIUM SERPL-MCNC: 9.3 MG/DL (ref 8.7–10.2)
CHLORIDE SERPL-SCNC: 102 MMOL/L (ref 96–106)
CO2 SERPL-SCNC: 19 MMOL/L (ref 20–29)
CREAT SERPL-MCNC: 0.52 MG/DL (ref 0.57–1)
CREAT UR-MCNC: 79.1 MG/DL
EGFRCR SERPLBLD CKD-EPI 2021: 133 ML/MIN/1.73
EOSINOPHIL # BLD AUTO: 0.1 X10E3/UL (ref 0–0.4)
EOSINOPHIL NFR BLD AUTO: 1 %
ERYTHROCYTE [DISTWIDTH] IN BLOOD BY AUTOMATED COUNT: 15.4 % (ref 11.7–15.4)
GLOBULIN SER CALC-MCNC: 3.1 G/DL (ref 1.5–4.5)
GLUCOSE SERPL-MCNC: 108 MG/DL (ref 65–99)
HCT VFR BLD AUTO: 38.6 % (ref 34–46.6)
HGB BLD-MCNC: 12.2 G/DL (ref 11.1–15.9)
IMM GRANULOCYTES # BLD AUTO: 0.1 X10E3/UL (ref 0–0.1)
IMM GRANULOCYTES NFR BLD AUTO: 1 %
LYMPHOCYTES # BLD AUTO: 2.2 X10E3/UL (ref 0.7–3.1)
LYMPHOCYTES NFR BLD AUTO: 19 %
MCH RBC QN AUTO: 25.5 PG (ref 26.6–33)
MCHC RBC AUTO-ENTMCNC: 31.6 G/DL (ref 31.5–35.7)
MCV RBC AUTO: 81 FL (ref 79–97)
MONOCYTES # BLD AUTO: 1 X10E3/UL (ref 0.1–0.9)
MONOCYTES NFR BLD AUTO: 8 %
NEUTROPHILS # BLD AUTO: 8.4 X10E3/UL (ref 1.4–7)
NEUTROPHILS NFR BLD AUTO: 71 %
PLATELET # BLD AUTO: 342 X10E3/UL (ref 150–450)
POTASSIUM SERPL-SCNC: 4.8 MMOL/L (ref 3.5–5.2)
PROT SERPL-MCNC: 6.9 G/DL (ref 6–8.5)
PROT UR-MCNC: 15.4 MG/DL
PROT/CREAT UR: 195 MG/G CREAT (ref 0–200)
RBC # BLD AUTO: 4.79 X10E6/UL (ref 3.77–5.28)
SODIUM SERPL-SCNC: 138 MMOL/L (ref 134–144)
WBC # BLD AUTO: 11.7 X10E3/UL (ref 3.4–10.8)

## 2022-05-30 ENCOUNTER — APPOINTMENT (OUTPATIENT)
Dept: CT IMAGING | Facility: HOSPITAL | Age: 22
End: 2022-05-30

## 2022-05-30 ENCOUNTER — HOSPITAL ENCOUNTER (EMERGENCY)
Facility: HOSPITAL | Age: 22
Discharge: HOME | End: 2022-05-30
Attending: OBSTETRICS & GYNECOLOGY | Admitting: OBSTETRICS & GYNECOLOGY

## 2022-05-30 VITALS
HEART RATE: 96 BPM | RESPIRATION RATE: 20 BRPM | SYSTOLIC BLOOD PRESSURE: 130 MMHG | DIASTOLIC BLOOD PRESSURE: 71 MMHG | OXYGEN SATURATION: 97 % | TEMPERATURE: 98 F

## 2022-05-30 DIAGNOSIS — R51.9 NONINTRACTABLE HEADACHE, UNSPECIFIED CHRONICITY PATTERN, UNSPECIFIED HEADACHE TYPE: ICD-10-CM

## 2022-05-30 DIAGNOSIS — O30.043 DICHORIONIC DIAMNIOTIC TWIN PREGNANCY IN THIRD TRIMESTER: ICD-10-CM

## 2022-05-30 DIAGNOSIS — R06.02 SHORTNESS OF BREATH: Primary | ICD-10-CM

## 2022-05-30 DIAGNOSIS — Z3A.29 PREGNANCY WITH 29 COMPLETED WEEKS GESTATION: ICD-10-CM

## 2022-05-30 LAB
ALBUMIN SERPL-MCNC: 3.3 G/DL (ref 3.5–5.2)
ALBUMIN/GLOB SERPL: 1.1 G/DL
ALP SERPL-CCNC: 114 U/L (ref 39–117)
ALT SERPL W P-5'-P-CCNC: 11 U/L (ref 1–33)
ANION GAP SERPL CALCULATED.3IONS-SCNC: 12 MMOL/L (ref 5–15)
AST SERPL-CCNC: 16 U/L (ref 1–32)
B PARAPERT DNA SPEC QL NAA+PROBE: NOT DETECTED
B PERT DNA SPEC QL NAA+PROBE: NOT DETECTED
BACTERIA UR QL AUTO: NORMAL /HPF
BASOPHILS # BLD AUTO: 0.02 10*3/MM3 (ref 0–0.2)
BASOPHILS NFR BLD AUTO: 0.2 % (ref 0–1.5)
BILIRUB SERPL-MCNC: 0.4 MG/DL (ref 0–1.2)
BILIRUB UR QL STRIP: NEGATIVE
BUN SERPL-MCNC: 3 MG/DL (ref 6–20)
BUN/CREAT SERPL: 6.4 (ref 7–25)
C PNEUM DNA NPH QL NAA+NON-PROBE: NOT DETECTED
CALCIUM SPEC-SCNC: 8.7 MG/DL (ref 8.6–10.5)
CHLORIDE SERPL-SCNC: 106 MMOL/L (ref 98–107)
CLARITY UR: CLEAR
CO2 SERPL-SCNC: 19 MMOL/L (ref 22–29)
COLOR UR: YELLOW
CREAT SERPL-MCNC: 0.47 MG/DL (ref 0.57–1)
CREAT UR-MCNC: 31.4 MG/DL
DEPRECATED RDW RBC AUTO: 45.7 FL (ref 37–54)
EGFRCR SERPLBLD CKD-EPI 2021: 136.5 ML/MIN/1.73
EOSINOPHIL # BLD AUTO: 0.07 10*3/MM3 (ref 0–0.4)
EOSINOPHIL NFR BLD AUTO: 0.7 % (ref 0.3–6.2)
ERYTHROCYTE [DISTWIDTH] IN BLOOD BY AUTOMATED COUNT: 15.5 % (ref 12.3–15.4)
FLUAV SUBTYP SPEC NAA+PROBE: NOT DETECTED
FLUBV RNA ISLT QL NAA+PROBE: NOT DETECTED
GLOBULIN UR ELPH-MCNC: 3 GM/DL
GLUCOSE SERPL-MCNC: 82 MG/DL (ref 65–99)
GLUCOSE UR STRIP-MCNC: NEGATIVE MG/DL
HADV DNA SPEC NAA+PROBE: NOT DETECTED
HCOV 229E RNA SPEC QL NAA+PROBE: NOT DETECTED
HCOV HKU1 RNA SPEC QL NAA+PROBE: NOT DETECTED
HCOV NL63 RNA SPEC QL NAA+PROBE: NOT DETECTED
HCOV OC43 RNA SPEC QL NAA+PROBE: NOT DETECTED
HCT VFR BLD AUTO: 35.9 % (ref 34–46.6)
HGB BLD-MCNC: 11.4 G/DL (ref 12–15.9)
HGB UR QL STRIP.AUTO: NEGATIVE
HMPV RNA NPH QL NAA+NON-PROBE: NOT DETECTED
HPIV1 RNA ISLT QL NAA+PROBE: NOT DETECTED
HPIV2 RNA SPEC QL NAA+PROBE: NOT DETECTED
HPIV3 RNA NPH QL NAA+PROBE: NOT DETECTED
HPIV4 P GENE NPH QL NAA+PROBE: NOT DETECTED
HYALINE CASTS UR QL AUTO: NORMAL /LPF
IMM GRANULOCYTES # BLD AUTO: 0.06 10*3/MM3 (ref 0–0.05)
IMM GRANULOCYTES NFR BLD AUTO: 0.6 % (ref 0–0.5)
KETONES UR QL STRIP: NEGATIVE
LEUKOCYTE ESTERASE UR QL STRIP.AUTO: ABNORMAL
LYMPHOCYTES # BLD AUTO: 2.29 10*3/MM3 (ref 0.7–3.1)
LYMPHOCYTES NFR BLD AUTO: 21.9 % (ref 19.6–45.3)
M PNEUMO IGG SER IA-ACNC: NOT DETECTED
MCH RBC QN AUTO: 25.8 PG (ref 26.6–33)
MCHC RBC AUTO-ENTMCNC: 31.8 G/DL (ref 31.5–35.7)
MCV RBC AUTO: 81.2 FL (ref 79–97)
MONOCYTES # BLD AUTO: 1.01 10*3/MM3 (ref 0.1–0.9)
MONOCYTES NFR BLD AUTO: 9.7 % (ref 5–12)
NEUTROPHILS NFR BLD AUTO: 6.99 10*3/MM3 (ref 1.7–7)
NEUTROPHILS NFR BLD AUTO: 66.9 % (ref 42.7–76)
NITRITE UR QL STRIP: NEGATIVE
NRBC BLD AUTO-RTO: 0 /100 WBC (ref 0–0.2)
NT-PROBNP SERPL-MCNC: 22.1 PG/ML (ref 0–450)
PH UR STRIP.AUTO: 6.5 [PH] (ref 5–8)
PLATELET # BLD AUTO: 312 10*3/MM3 (ref 140–450)
PMV BLD AUTO: 10.8 FL (ref 6–12)
POTASSIUM SERPL-SCNC: 4.2 MMOL/L (ref 3.5–5.2)
PROT ?TM UR-MCNC: 6.3 MG/DL
PROT SERPL-MCNC: 6.3 G/DL (ref 6–8.5)
PROT UR QL STRIP: NEGATIVE
PROT/CREAT UR: 200.6 MG/G CREA (ref 0–200)
RBC # BLD AUTO: 4.42 10*6/MM3 (ref 3.77–5.28)
RBC # UR STRIP: NORMAL /HPF
REF LAB TEST METHOD: NORMAL
RHINOVIRUS RNA SPEC NAA+PROBE: NOT DETECTED
RSV RNA NPH QL NAA+NON-PROBE: NOT DETECTED
SARS-COV-2 RNA NPH QL NAA+NON-PROBE: NOT DETECTED
SODIUM SERPL-SCNC: 137 MMOL/L (ref 136–145)
SP GR UR STRIP: <=1.005 (ref 1–1.03)
SQUAMOUS #/AREA URNS HPF: NORMAL /HPF
T4 FREE SERPL-MCNC: 0.88 NG/DL (ref 0.93–1.7)
TROPONIN T SERPL-MCNC: <0.01 NG/ML (ref 0–0.03)
TSH SERPL DL<=0.05 MIU/L-ACNC: 1.85 UIU/ML (ref 0.27–4.2)
UROBILINOGEN UR QL STRIP: ABNORMAL
WBC # UR STRIP: NORMAL /HPF
WBC NRBC COR # BLD: 10.44 10*3/MM3 (ref 3.4–10.8)

## 2022-05-30 PROCEDURE — 80050 GENERAL HEALTH PANEL: CPT | Performed by: OBSTETRICS & GYNECOLOGY

## 2022-05-30 PROCEDURE — 99284 EMERGENCY DEPT VISIT MOD MDM: CPT | Performed by: OBSTETRICS & GYNECOLOGY

## 2022-05-30 PROCEDURE — 93005 ELECTROCARDIOGRAM TRACING: CPT

## 2022-05-30 PROCEDURE — 87086 URINE CULTURE/COLONY COUNT: CPT | Performed by: OBSTETRICS & GYNECOLOGY

## 2022-05-30 PROCEDURE — 82570 ASSAY OF URINE CREATININE: CPT | Performed by: OBSTETRICS & GYNECOLOGY

## 2022-05-30 PROCEDURE — 59025 FETAL NON-STRESS TEST: CPT

## 2022-05-30 PROCEDURE — 84439 ASSAY OF FREE THYROXINE: CPT | Performed by: NURSE PRACTITIONER

## 2022-05-30 PROCEDURE — 0 IOPAMIDOL PER 1 ML: Performed by: EMERGENCY MEDICINE

## 2022-05-30 PROCEDURE — 0202U NFCT DS 22 TRGT SARS-COV-2: CPT | Performed by: NURSE PRACTITIONER

## 2022-05-30 PROCEDURE — 81001 URINALYSIS AUTO W/SCOPE: CPT | Performed by: OBSTETRICS & GYNECOLOGY

## 2022-05-30 PROCEDURE — 84156 ASSAY OF PROTEIN URINE: CPT | Performed by: OBSTETRICS & GYNECOLOGY

## 2022-05-30 PROCEDURE — 71275 CT ANGIOGRAPHY CHEST: CPT

## 2022-05-30 PROCEDURE — 83880 ASSAY OF NATRIURETIC PEPTIDE: CPT | Performed by: NURSE PRACTITIONER

## 2022-05-30 PROCEDURE — 84484 ASSAY OF TROPONIN QUANT: CPT | Performed by: NURSE PRACTITIONER

## 2022-05-30 PROCEDURE — 93010 ELECTROCARDIOGRAM REPORT: CPT | Performed by: INTERNAL MEDICINE

## 2022-05-30 RX ORDER — BUTALBITAL, ACETAMINOPHEN AND CAFFEINE 50; 325; 40 MG/1; MG/1; MG/1
2 TABLET ORAL ONCE
Status: COMPLETED | OUTPATIENT
Start: 2022-05-30 | End: 2022-05-30

## 2022-05-30 RX ADMIN — BUTALBITAL, ACETAMINOPHEN, AND CAFFEINE 2 TABLET: 50; 325; 40 TABLET ORAL at 17:47

## 2022-05-30 RX ADMIN — IOPAMIDOL 95 ML: 755 INJECTION, SOLUTION INTRAVENOUS at 21:16

## 2022-05-31 ENCOUNTER — TELEPHONE (OUTPATIENT)
Dept: OBSTETRICS AND GYNECOLOGY | Facility: CLINIC | Age: 22
End: 2022-05-31

## 2022-05-31 LAB
BACTERIA SPEC AEROBE CULT: NO GROWTH
QT INTERVAL: 339 MS

## 2022-05-31 NOTE — TELEPHONE ENCOUNTER
----- Message from Luis Alberto Asher MD sent at 5/30/2022 12:26 PM EDT -----  Galilea, Her UTI was confirmed. Should be taking macrobid as Rx. That will cover the infection. Thanks, Dr. Asher

## 2022-05-31 NOTE — TELEPHONE ENCOUNTER
----- Message from Luis Alberto Asher MD sent at 5/30/2022  9:51 AM EDT -----  GRANT [facility] labs Friday. She does not show evidence of pre-eclampsia or HELLP syndrome. Please let her know. Thanks, Dr. Asher

## 2022-06-01 ENCOUNTER — TELEPHONE (OUTPATIENT)
Dept: OBSTETRICS AND GYNECOLOGY | Facility: CLINIC | Age: 22
End: 2022-06-01

## 2022-06-01 ENCOUNTER — ROUTINE PRENATAL (OUTPATIENT)
Dept: OBSTETRICS AND GYNECOLOGY | Facility: CLINIC | Age: 22
End: 2022-06-01

## 2022-06-01 VITALS — SYSTOLIC BLOOD PRESSURE: 133 MMHG | WEIGHT: 255 LBS | BODY MASS INDEX: 43.77 KG/M2 | DIASTOLIC BLOOD PRESSURE: 89 MMHG

## 2022-06-01 DIAGNOSIS — O30.043 DICHORIONIC DIAMNIOTIC TWIN PREGNANCY IN THIRD TRIMESTER: ICD-10-CM

## 2022-06-01 DIAGNOSIS — O34.219 PREVIOUS CESAREAN DELIVERY, ANTEPARTUM: ICD-10-CM

## 2022-06-01 DIAGNOSIS — O10.013 PRE-EXISTING ESSENTIAL HYPERTENSION DURING PREGNANCY IN THIRD TRIMESTER: ICD-10-CM

## 2022-06-01 DIAGNOSIS — E03.9 HYPOTHYROIDISM AFFECTING PREGNANCY IN THIRD TRIMESTER: ICD-10-CM

## 2022-06-01 DIAGNOSIS — O09.93 HIGH-RISK PREGNANCY IN THIRD TRIMESTER: Primary | ICD-10-CM

## 2022-06-01 DIAGNOSIS — E66.01 MATERNAL MORBID OBESITY IN THIRD TRIMESTER, ANTEPARTUM: ICD-10-CM

## 2022-06-01 DIAGNOSIS — O99.213 MATERNAL MORBID OBESITY IN THIRD TRIMESTER, ANTEPARTUM: ICD-10-CM

## 2022-06-01 DIAGNOSIS — O23.43 URINARY TRACT INFECTION IN MOTHER DURING THIRD TRIMESTER OF PREGNANCY: ICD-10-CM

## 2022-06-01 DIAGNOSIS — O24.410 DIET CONTROLLED GESTATIONAL DIABETES MELLITUS (GDM) IN THIRD TRIMESTER: ICD-10-CM

## 2022-06-01 DIAGNOSIS — R07.9 CHEST PAIN DURING PREGNANCY: ICD-10-CM

## 2022-06-01 DIAGNOSIS — O99.283 HYPOTHYROIDISM AFFECTING PREGNANCY IN THIRD TRIMESTER: ICD-10-CM

## 2022-06-01 DIAGNOSIS — O99.891 CHEST PAIN DURING PREGNANCY: ICD-10-CM

## 2022-06-01 LAB
GLUCOSE UR STRIP-MCNC: NEGATIVE MG/DL
PROT UR STRIP-MCNC: ABNORMAL MG/DL

## 2022-06-01 PROCEDURE — 99214 OFFICE O/P EST MOD 30 MIN: CPT | Performed by: OBSTETRICS & GYNECOLOGY

## 2022-06-01 RX ORDER — LEVOTHYROXINE SODIUM 0.03 MG/1
25 TABLET ORAL DAILY
Qty: 90 TABLET | Refills: 1 | Status: SHIPPED | OUTPATIENT
Start: 2022-06-01 | End: 2022-07-08

## 2022-06-01 NOTE — TELEPHONE ENCOUNTER
----- Message from Luis Alberto Asher MD sent at 6/1/2022 10:36 AM EDT -----  Ronda, please help arrange referral to cardiology. Thanks, Dr. Asher

## 2022-06-01 NOTE — PROGRESS NOTES
OB follow up     Beth Gaviria is a 22 y.o.  30w1d being seen today for her obstetrical visit.  Patient reports chest pain since 2022. . Fetal movement: normal.    Her prenatal care is complicated by (and status): Prior , Di/Di twins and Gest DM     - Seen in OB ED and ED for chest pain last week   Ruled out for heart failure and PE   Here with persistent chest pain Mid-left side with radiation down arm  Also with headache.       Review of Systems  Cramping/contractions : none  Vaginal bleeding: none   Fetal movement good x 2    /89   Wt 116 kg (255 lb)   LMP 10/01/2021   BMI 43.77 kg/m²     FHT: 134 and 165 BPM   Uterine Size: 35 cm       Assessment    Diagnoses and all orders for this visit:    1. High-risk pregnancy in third trimester (Primary)    2. Dichorionic diamniotic twin pregnancy in third trimester    3. Maternal morbid obesity in third trimester, antepartum (HCC)    4. Previous  delivery, antepartum    5. Diet controlled gestational diabetes mellitus (GDM) in third trimester    6. Pre-existing essential hypertension during pregnancy in third trimester    7. Chest pain during pregnancy  -     Ambulatory Referral to Cardiology    8. Urinary tract infection in mother during third trimester of pregnancy    9. Hypothyroidism affecting pregnancy in third trimester    Other orders  -     levothyroxine (SYNTHROID, LEVOTHROID) 25 MCG tablet; Take 1 tablet by mouth Daily.  Dispense: 90 tablet; Refill: 1        1) pregnancy at 30w1d   2) Di/Di twins  MFM following and has scheduled upcoming BPP starting at 32 weeks ( and upcoming growth).   Noted polyhydramnios and macrosomia on recent scans   3) Gest DM,   Still only on diet, insurance has yet to approve glyburide.   Fasting 80-85 per patient.   Post meals 115-120 per patient   No log provided, concern with above findings but appears to be controlled on diet alone   4) recent  contractions   Started macrobid for UTI  that was recently confirmed   Complete treatment.   5) Chest pain, headache  Extensive work up with OB ED and ED  No evidence of pre-eclampsia, PE or heart failure   But pain issue persists, will refer cardiology to consider Echo as outpatient.   6) Prior    Repeat at term   7) Obesity   Overall stable, reasonable gain   Pre pregnancy BMI > 40   8) T4 low on check   Add small dose of synthroid and recheck down the road         Plan    Reviewed this stage of pregnancy  Problem list updated   Follow up in 1 weeks    Luis Alberto Asher MD   2022  10:34 EDT

## 2022-06-06 ENCOUNTER — ROUTINE PRENATAL (OUTPATIENT)
Dept: OBSTETRICS AND GYNECOLOGY | Facility: CLINIC | Age: 22
End: 2022-06-06

## 2022-06-06 VITALS — WEIGHT: 256 LBS | DIASTOLIC BLOOD PRESSURE: 83 MMHG | BODY MASS INDEX: 43.94 KG/M2 | SYSTOLIC BLOOD PRESSURE: 125 MMHG

## 2022-06-06 DIAGNOSIS — O30.043 DICHORIONIC DIAMNIOTIC TWIN PREGNANCY IN THIRD TRIMESTER: ICD-10-CM

## 2022-06-06 DIAGNOSIS — O99.213 MATERNAL MORBID OBESITY IN THIRD TRIMESTER, ANTEPARTUM: ICD-10-CM

## 2022-06-06 DIAGNOSIS — E66.01 MATERNAL MORBID OBESITY IN THIRD TRIMESTER, ANTEPARTUM: ICD-10-CM

## 2022-06-06 DIAGNOSIS — O24.410 DIET CONTROLLED GESTATIONAL DIABETES MELLITUS (GDM) IN THIRD TRIMESTER: ICD-10-CM

## 2022-06-06 DIAGNOSIS — O34.219 PREVIOUS CESAREAN DELIVERY, ANTEPARTUM: ICD-10-CM

## 2022-06-06 DIAGNOSIS — O09.93 HIGH-RISK PREGNANCY IN THIRD TRIMESTER: Primary | ICD-10-CM

## 2022-06-06 DIAGNOSIS — E03.9 HYPOTHYROIDISM AFFECTING PREGNANCY IN THIRD TRIMESTER: ICD-10-CM

## 2022-06-06 DIAGNOSIS — O99.283 HYPOTHYROIDISM AFFECTING PREGNANCY IN THIRD TRIMESTER: ICD-10-CM

## 2022-06-06 LAB
GLUCOSE UR STRIP-MCNC: NEGATIVE MG/DL
PROT UR STRIP-MCNC: ABNORMAL MG/DL

## 2022-06-06 PROCEDURE — 99213 OFFICE O/P EST LOW 20 MIN: CPT | Performed by: OBSTETRICS & GYNECOLOGY

## 2022-06-06 NOTE — PROGRESS NOTES
OB follow up     Beth Gaviria is a 22 y.o.  30w6d being seen today for her obstetrical visit.  Patient reports pressure.. Fetal movement: normal.    Her prenatal care is complicated by (and status): Di/Di twins, Gest DM, A1, prior , maternal obesity     Review of Systems  Cramping/contractions : none   Vaginal bleeding: none  Fetal movement good     /83   Wt 116 kg (256 lb)   LMP 10/01/2021   BMI 43.94 kg/m²     FHT: 146 and 140 BPM   Uterine Size: 35 cm       Assessment    Diagnoses and all orders for this visit:    1. High-risk pregnancy in third trimester (Primary)    2. Dichorionic diamniotic twin pregnancy in third trimester    3. Maternal morbid obesity in third trimester, antepartum (HCC)    4. Previous  delivery, antepartum    5. Diet controlled gestational diabetes mellitus (GDM) in third trimester  -     Hemoglobin A1c; Future    6. Hypothyroidism affecting pregnancy in third trimester  -     T4, Free; Future  -     TSH; Future        1) pregnancy at 30w6d   2) Di/Di twins   MFM with growth and BPP upcoming.   3) Gest DM, diet controlled per patient.   Fasting and postprandial look control (just on diet)   Did  glyburide - Hold until see uptick in her values.   Consider repeat HgA1c with next visit and T4 to ensure good control   4) Prior   Repeat at term   5) Obesity   Overall gain reasonable at this poitn  Pre pregnancy BMI > 40, but will need ANT before 34 weeks as above.   5) Hypothyroid   See prior discussions above         Plan    Reviewed this stage of pregnancy  Problem list updated   Follow up in 2 weeks    Luis Alberto Asher MD   2022  13:12 EDT

## 2022-06-08 ENCOUNTER — REFERRAL TRIAGE (OUTPATIENT)
Dept: OBSTETRICS AND GYNECOLOGY | Facility: HOSPITAL | Age: 22
End: 2022-06-08

## 2022-06-10 ENCOUNTER — TELEPHONE (OUTPATIENT)
Dept: OBSTETRICS AND GYNECOLOGY | Facility: CLINIC | Age: 22
End: 2022-06-10

## 2022-06-10 NOTE — TELEPHONE ENCOUNTER
Spoke with Lupe at [facility] (Dr. Geronimo Betts office).  Called their office to schedule pt, Lupe said she would call pt to schedule.  She has now attempted to reach pt twice with no success.  I have also left pt a msg encouraging her to call them back as soon as possible. Office # [phone]    Lupe also stated the referral is only to consult w/the cardiologist.  If you want the 2D echo, a separate referral is required (just like with [facility] and their echos)-they will schedule her the same day as consult.  Or , if you want the cardiologist to determine if an echo is needed, nothing else needs to be entered, but the echo might be scheduled at a later date.

## 2022-06-10 NOTE — TELEPHONE ENCOUNTER
----- Message from Luis Alberto Asher MD sent at 6/6/2022  1:14 PM EDT -----  Ronda, still waiting on cardiology to schedule her for 2D echo? Thanks, Dr. Asher

## 2022-06-10 NOTE — TELEPHONE ENCOUNTER
Ronda,     That was up to cardiologist, not me.   She had chest pain late last month and was seen in L&D and the ED. Since her evaluation did not show a PE, and her pain was still a problem, I suggested cardiology evaluation. That is all I am trying to do. If her pain has improved and she no longer wants to follow up, that is fine.     Thanks,   Dr. Asher

## 2022-06-17 ENCOUNTER — APPOINTMENT (OUTPATIENT)
Dept: ULTRASOUND IMAGING | Facility: HOSPITAL | Age: 22
End: 2022-06-17

## 2022-06-23 ENCOUNTER — ROUTINE PRENATAL (OUTPATIENT)
Dept: OBSTETRICS AND GYNECOLOGY | Facility: CLINIC | Age: 22
End: 2022-06-23

## 2022-06-23 VITALS — SYSTOLIC BLOOD PRESSURE: 118 MMHG | BODY MASS INDEX: 45.32 KG/M2 | WEIGHT: 264 LBS | DIASTOLIC BLOOD PRESSURE: 80 MMHG

## 2022-06-23 DIAGNOSIS — O34.219 PREVIOUS CESAREAN DELIVERY, ANTEPARTUM: ICD-10-CM

## 2022-06-23 DIAGNOSIS — Z3A.33 33 WEEKS GESTATION OF PREGNANCY: ICD-10-CM

## 2022-06-23 DIAGNOSIS — O24.410 DIET CONTROLLED GESTATIONAL DIABETES MELLITUS (GDM) IN THIRD TRIMESTER: ICD-10-CM

## 2022-06-23 DIAGNOSIS — O09.93 HIGH-RISK PREGNANCY IN THIRD TRIMESTER: ICD-10-CM

## 2022-06-23 DIAGNOSIS — O30.043 DICHORIONIC DIAMNIOTIC TWIN PREGNANCY IN THIRD TRIMESTER: Primary | ICD-10-CM

## 2022-06-23 LAB
GLUCOSE UR STRIP-MCNC: NEGATIVE MG/DL
PROT UR STRIP-MCNC: ABNORMAL MG/DL

## 2022-06-23 PROCEDURE — 99213 OFFICE O/P EST LOW 20 MIN: CPT | Performed by: OBSTETRICS & GYNECOLOGY

## 2022-06-23 NOTE — PROGRESS NOTES
"Cc:  Pregnancy follow up.  C/o round ligament pain.  Good FM.  No bleeding or spotting.   Patient reports decreased appetite and nausea.  She reports \"needing to be in a hurry\" because mother in car accident while patient waiting to be seen.  Vitals reviewed by me.  Gen - alert and pleasant.  Abdomen - gravid, nontender, no guarding or rebound.  A/P:  IUP at 33 week with dichorionic twins, GDM, nausea  - Twins.  Patient has growth sonogram tomorrow with [facility].  - Nausea/GDM.  This was not discussed due to brief nature of visit.  However, patient seeing Lb next week.  - C/S.  Discussed tubal sterilization at time of procedure.  "

## 2022-06-24 ENCOUNTER — HOSPITAL ENCOUNTER (OUTPATIENT)
Dept: ULTRASOUND IMAGING | Facility: HOSPITAL | Age: 22
Discharge: HOME | End: 2022-06-24
Admitting: OBSTETRICS & GYNECOLOGY

## 2022-06-24 ENCOUNTER — OFFICE VISIT (OUTPATIENT)
Dept: OBSTETRICS AND GYNECOLOGY | Facility: CLINIC | Age: 22
End: 2022-06-24

## 2022-06-24 ENCOUNTER — HOSPITAL ENCOUNTER (EMERGENCY)
Facility: HOSPITAL | Age: 22
Discharge: LEFT BEFORE DISCHARGE | End: 2022-06-25
Attending: OBSTETRICS & GYNECOLOGY | Admitting: OBSTETRICS & GYNECOLOGY

## 2022-06-24 ENCOUNTER — TRANSCRIBE ORDERS (OUTPATIENT)
Dept: ULTRASOUND IMAGING | Facility: HOSPITAL | Age: 22
End: 2022-06-24

## 2022-06-24 VITALS
HEART RATE: 98 BPM | SYSTOLIC BLOOD PRESSURE: 132 MMHG | BODY MASS INDEX: 45.56 KG/M2 | DIASTOLIC BLOOD PRESSURE: 82 MMHG | TEMPERATURE: 97.6 F | WEIGHT: 265.4 LBS

## 2022-06-24 VITALS
OXYGEN SATURATION: 99 % | RESPIRATION RATE: 20 BRPM | HEART RATE: 108 BPM | HEIGHT: 64 IN | DIASTOLIC BLOOD PRESSURE: 71 MMHG | BODY MASS INDEX: 45.45 KG/M2 | TEMPERATURE: 98.3 F | SYSTOLIC BLOOD PRESSURE: 127 MMHG | WEIGHT: 266.2 LBS

## 2022-06-24 DIAGNOSIS — O30.043 DICHORIONIC DIAMNIOTIC TWIN PREGNANCY IN THIRD TRIMESTER: Primary | ICD-10-CM

## 2022-06-24 DIAGNOSIS — E66.01 MORBID OBESITY WITH BMI OF 45.0-49.9, ADULT: ICD-10-CM

## 2022-06-24 DIAGNOSIS — O30.042 DICHORIONIC DIAMNIOTIC TWIN PREGNANCY IN SECOND TRIMESTER: ICD-10-CM

## 2022-06-24 DIAGNOSIS — O30.043 DICHORIONIC DIAMNIOTIC TWIN PREGNANCY IN THIRD TRIMESTER: ICD-10-CM

## 2022-06-24 DIAGNOSIS — O24.415 GESTATIONAL DIABETES MELLITUS (GDM) CONTROLLED ON ORAL HYPOGLYCEMIC DRUG, ANTEPARTUM: Primary | ICD-10-CM

## 2022-06-24 DIAGNOSIS — O10.013 PRE-EXISTING ESSENTIAL HYPERTENSION COMPLICATING PREGNANCY, THIRD TRIMESTER: ICD-10-CM

## 2022-06-24 DIAGNOSIS — E66.01 MORBID OBESITY: ICD-10-CM

## 2022-06-24 LAB
BILIRUB UR QL STRIP: NEGATIVE
CLARITY UR: CLEAR
COLOR UR: YELLOW
CREAT UR-MCNC: 60.4 MG/DL
GLUCOSE UR STRIP-MCNC: NEGATIVE MG/DL
HGB UR QL STRIP.AUTO: NEGATIVE
KETONES UR QL STRIP: NEGATIVE
LEUKOCYTE ESTERASE UR QL STRIP.AUTO: NEGATIVE
NITRITE UR QL STRIP: NEGATIVE
PH UR STRIP.AUTO: 6 [PH] (ref 5–8)
PROT ?TM UR-MCNC: 8 MG/DL
PROT UR QL STRIP: NEGATIVE
PROT/CREAT UR: 132.5 MG/G CREA (ref 0–200)
SP GR UR STRIP: 1.01 (ref 1–1.03)
UROBILINOGEN UR QL STRIP: NORMAL

## 2022-06-24 PROCEDURE — 84156 ASSAY OF PROTEIN URINE: CPT | Performed by: OBSTETRICS & GYNECOLOGY

## 2022-06-24 PROCEDURE — 93005 ELECTROCARDIOGRAM TRACING: CPT | Performed by: OBSTETRICS & GYNECOLOGY

## 2022-06-24 PROCEDURE — 81003 URINALYSIS AUTO W/O SCOPE: CPT | Performed by: OBSTETRICS & GYNECOLOGY

## 2022-06-24 PROCEDURE — 99212 OFFICE O/P EST SF 10 MIN: CPT | Performed by: OBSTETRICS & GYNECOLOGY

## 2022-06-24 PROCEDURE — 93010 ELECTROCARDIOGRAM REPORT: CPT | Performed by: INTERNAL MEDICINE

## 2022-06-24 PROCEDURE — 76816 OB US FOLLOW-UP PER FETUS: CPT

## 2022-06-24 PROCEDURE — 76819 FETAL BIOPHYS PROFIL W/O NST: CPT | Performed by: OBSTETRICS & GYNECOLOGY

## 2022-06-24 PROCEDURE — 82570 ASSAY OF URINE CREATININE: CPT | Performed by: OBSTETRICS & GYNECOLOGY

## 2022-06-24 PROCEDURE — 99211 OFF/OP EST MAY X REQ PHY/QHP: CPT

## 2022-06-24 PROCEDURE — 59025 FETAL NON-STRESS TEST: CPT

## 2022-06-24 PROCEDURE — 76816 OB US FOLLOW-UP PER FETUS: CPT | Performed by: OBSTETRICS & GYNECOLOGY

## 2022-06-24 PROCEDURE — 99284 EMERGENCY DEPT VISIT MOD MDM: CPT | Performed by: OBSTETRICS & GYNECOLOGY

## 2022-06-24 PROCEDURE — 87086 URINE CULTURE/COLONY COUNT: CPT | Performed by: OBSTETRICS & GYNECOLOGY

## 2022-06-24 PROCEDURE — 76819 FETAL BIOPHYS PROFIL W/O NST: CPT

## 2022-06-24 NOTE — PROGRESS NOTES
"Pt denies vaginal bleeding or LOF. Reports active fetal movement x2. Notes that recently she was seen in GEE and the ER for chest pain. Reports that it feels like \"someone is sitting on my chest.\" Notes that Dr. Asher put in a referral for cardiology. Per pt report the earliest appointment is after her delivery. Encouraged patient to call and go for evaluation with worsening pain. Notes that the chest pain is d/t stress and over excursion. Reports constant HA, denies visual changes or epigastric pain. Notes increasing lower extremity swelling. Currently wearing belly band to help with support, reports great success. Next OB appointment is scheduled for 7/1. Precautions reviewed with pt and verbalized understanding.     Vitals:    06/24/22 0915   BP: 132/82   Pulse: 98   Temp: 97.6 °F (36.4 °C)        "

## 2022-06-24 NOTE — PROGRESS NOTES
Beth Gaviria is a 22 y.o. female,  Patient's last menstrual period was 10/01/2021. EDC of 2022, by Ultrasound now 33w3d weeks seen for F/U as requested by Luis Alberto Asher MD secondary to:    1) Diamniotic-dichorionic twin gestation  2) Morbid obesity - BMI - 45.56  3) Class A2 GDM  4) Polyhydramnios    Vitals:    22 0915   BP: 132/82   Pulse: 98   Temp: 97.6 °F (36.4 °C)     Patient is doing well and is without complaints.    Ultrasound images and findings reviewed with patient.  Diamniotic-dichorionic twin gestation.  Dividing membrane is seen.  Polyhydramnios is seen around both twins.    Twin A:    Fetal growth is appropriate. AC- 94%.  No fetal anomalies were identified. Fetal testing is reassuring.     Twin B:    Fetal growth is appropriate but at the upper limit of normal @ 90%.  AC > 99%.  No fetal anomalies were identified. Fetal testing is reassuring.     ASSESSMENT:     1) Diamniotic-dichorionic twin gestation  2) Morbid obesity - BMI - 45.56  3) Class A2 GDM  4) Polyhydramnios    Questions answered.    Recommendations:    1) Weekly BPP to be done by Provider  2) Serial U/S every 4 weeks for fetal growth    Total time spent TODAY on this encounter, including pre-visit review of separately obtained history, face-to-face interaction performing medically appropriate physical exam, patient counseling/education, interpretation of diagnostic results, care coordination and documentation was 15 minutes.      Thank you for utilizing our ultrasound and consultative services.  If I may be of any further service, please do not hesitate to contact me.    Sincerely,    Dio Islas MD  Maternal-Fetal Medicine

## 2022-06-25 LAB
BACTERIA SPEC AEROBE CULT: NO GROWTH
QT INTERVAL: 328 MS

## 2022-07-01 ENCOUNTER — ROUTINE PRENATAL (OUTPATIENT)
Dept: OBSTETRICS AND GYNECOLOGY | Facility: CLINIC | Age: 22
End: 2022-07-01

## 2022-07-01 ENCOUNTER — HOSPITAL ENCOUNTER (INPATIENT)
Facility: HOSPITAL | Age: 22
LOS: 5 days | Discharge: HOME | End: 2022-07-06
Attending: OBSTETRICS & GYNECOLOGY | Admitting: OBSTETRICS & GYNECOLOGY

## 2022-07-01 ENCOUNTER — ANESTHESIA EVENT (OUTPATIENT)
Dept: LABOR AND DELIVERY | Facility: HOSPITAL | Age: 22
End: 2022-07-01

## 2022-07-01 VITALS — SYSTOLIC BLOOD PRESSURE: 147 MMHG | DIASTOLIC BLOOD PRESSURE: 101 MMHG | WEIGHT: 271 LBS | BODY MASS INDEX: 46.52 KG/M2

## 2022-07-01 DIAGNOSIS — O99.283 HYPOTHYROIDISM AFFECTING PREGNANCY IN THIRD TRIMESTER: ICD-10-CM

## 2022-07-01 DIAGNOSIS — O10.913 CHRONIC HYPERTENSION WITH EXACERBATION DURING PREGNANCY IN THIRD TRIMESTER: ICD-10-CM

## 2022-07-01 DIAGNOSIS — O30.043 DICHORIONIC DIAMNIOTIC TWIN PREGNANCY IN THIRD TRIMESTER: Primary | ICD-10-CM

## 2022-07-01 DIAGNOSIS — O24.415 GESTATIONAL DIABETES MELLITUS (GDM) CONTROLLED ON ORAL HYPOGLYCEMIC DRUG, ANTEPARTUM: ICD-10-CM

## 2022-07-01 DIAGNOSIS — O99.213 MATERNAL MORBID OBESITY IN THIRD TRIMESTER, ANTEPARTUM: ICD-10-CM

## 2022-07-01 DIAGNOSIS — O09.93 HIGH-RISK PREGNANCY IN THIRD TRIMESTER: ICD-10-CM

## 2022-07-01 DIAGNOSIS — Z98.891 S/P CESAREAN SECTION: Primary | ICD-10-CM

## 2022-07-01 DIAGNOSIS — E66.01 MATERNAL MORBID OBESITY IN THIRD TRIMESTER, ANTEPARTUM: ICD-10-CM

## 2022-07-01 DIAGNOSIS — E03.9 HYPOTHYROIDISM AFFECTING PREGNANCY IN THIRD TRIMESTER: ICD-10-CM

## 2022-07-01 DIAGNOSIS — O34.219 PREVIOUS CESAREAN DELIVERY, ANTEPARTUM: ICD-10-CM

## 2022-07-01 DIAGNOSIS — O30.042 DICHORIONIC DIAMNIOTIC TWIN PREGNANCY IN SECOND TRIMESTER: ICD-10-CM

## 2022-07-01 PROBLEM — O40.3XX0 POLYHYDRAMNIOS IN THIRD TRIMESTER: Status: ACTIVE | Noted: 2022-07-01

## 2022-07-01 PROBLEM — O36.63X0 MACROSOMIA OF FETUS AFFECTING MANAGEMENT OF MOTHER IN THIRD TRIMESTER: Status: ACTIVE | Noted: 2022-07-01

## 2022-07-01 PROBLEM — O24.410 DIET CONTROLLED GESTATIONAL DIABETES MELLITUS (GDM) IN THIRD TRIMESTER: Status: ACTIVE | Noted: 2022-07-01

## 2022-07-01 PROBLEM — O36.8130 DECREASED FETAL MOVEMENTS IN THIRD TRIMESTER: Status: ACTIVE | Noted: 2022-07-01

## 2022-07-01 LAB
ABO GROUP BLD: NORMAL
ALBUMIN SERPL-MCNC: 3 G/DL (ref 3.5–5.2)
ALBUMIN/GLOB SERPL: 0.9 G/DL
ALP SERPL-CCNC: 158 U/L (ref 39–117)
ALT SERPL W P-5'-P-CCNC: 7 U/L (ref 1–33)
ANION GAP SERPL CALCULATED.3IONS-SCNC: 15 MMOL/L (ref 5–15)
AST SERPL-CCNC: 14 U/L (ref 1–32)
BASOPHILS # BLD AUTO: 0.02 10*3/MM3 (ref 0–0.2)
BASOPHILS NFR BLD AUTO: 0.2 % (ref 0–1.5)
BILIRUB SERPL-MCNC: 0.4 MG/DL (ref 0–1.2)
BLD GP AB SCN SERPL QL: NEGATIVE
BUN SERPL-MCNC: 5 MG/DL (ref 6–20)
BUN/CREAT SERPL: 7.1 (ref 7–25)
CALCIUM SPEC-SCNC: 8.6 MG/DL (ref 8.6–10.5)
CHLORIDE SERPL-SCNC: 103 MMOL/L (ref 98–107)
CO2 SERPL-SCNC: 18 MMOL/L (ref 22–29)
CREAT SERPL-MCNC: 0.7 MG/DL (ref 0.57–1)
CREAT UR-MCNC: 289 MG/DL
DEPRECATED RDW RBC AUTO: 44.9 FL (ref 37–54)
EGFRCR SERPLBLD CKD-EPI 2021: 124 ML/MIN/1.73
EOSINOPHIL # BLD AUTO: 0.04 10*3/MM3 (ref 0–0.4)
EOSINOPHIL NFR BLD AUTO: 0.4 % (ref 0.3–6.2)
ERYTHROCYTE [DISTWIDTH] IN BLOOD BY AUTOMATED COUNT: 16 % (ref 12.3–15.4)
GLOBULIN UR ELPH-MCNC: 3.2 GM/DL
GLUCOSE BLDC GLUCOMTR-MCNC: 116 MG/DL (ref 70–130)
GLUCOSE SERPL-MCNC: 84 MG/DL (ref 65–99)
GLUCOSE UR STRIP-MCNC: NEGATIVE MG/DL
HBA1C MFR BLD: 6 % (ref 4.8–5.6)
HCT VFR BLD AUTO: 36.9 % (ref 34–46.6)
HGB BLD-MCNC: 11.7 G/DL (ref 12–15.9)
IMM GRANULOCYTES # BLD AUTO: 0.04 10*3/MM3 (ref 0–0.05)
IMM GRANULOCYTES NFR BLD AUTO: 0.4 % (ref 0–0.5)
LYMPHOCYTES # BLD AUTO: 2.09 10*3/MM3 (ref 0.7–3.1)
LYMPHOCYTES NFR BLD AUTO: 19 % (ref 19.6–45.3)
MCH RBC QN AUTO: 25.1 PG (ref 26.6–33)
MCHC RBC AUTO-ENTMCNC: 31.7 G/DL (ref 31.5–35.7)
MCV RBC AUTO: 79 FL (ref 79–97)
MONOCYTES # BLD AUTO: 0.9 10*3/MM3 (ref 0.1–0.9)
MONOCYTES NFR BLD AUTO: 8.2 % (ref 5–12)
NEUTROPHILS NFR BLD AUTO: 7.89 10*3/MM3 (ref 1.7–7)
NEUTROPHILS NFR BLD AUTO: 71.8 % (ref 42.7–76)
NRBC BLD AUTO-RTO: 0.1 /100 WBC (ref 0–0.2)
PLATELET # BLD AUTO: 284 10*3/MM3 (ref 140–450)
PMV BLD AUTO: 11.6 FL (ref 6–12)
POTASSIUM SERPL-SCNC: 4.1 MMOL/L (ref 3.5–5.2)
PROT ?TM UR-MCNC: 83.1 MG/DL
PROT SERPL-MCNC: 6.2 G/DL (ref 6–8.5)
PROT UR STRIP-MCNC: ABNORMAL MG/DL
PROT/CREAT UR: 287.5 MG/G CREA (ref 0–200)
RBC # BLD AUTO: 4.67 10*6/MM3 (ref 3.77–5.28)
RH BLD: POSITIVE
SARS-COV-2 RNA RESP QL NAA+PROBE: NOT DETECTED
SODIUM SERPL-SCNC: 136 MMOL/L (ref 136–145)
T&S EXPIRATION DATE: NORMAL
T4 FREE SERPL-MCNC: 0.9 NG/DL (ref 0.93–1.7)
TSH SERPL DL<=0.05 MIU/L-ACNC: 5.25 UIU/ML (ref 0.27–4.2)
WBC NRBC COR # BLD: 10.98 10*3/MM3 (ref 3.4–10.8)

## 2022-07-01 PROCEDURE — 82570 ASSAY OF URINE CREATININE: CPT | Performed by: OBSTETRICS & GYNECOLOGY

## 2022-07-01 PROCEDURE — 86900 BLOOD TYPING SEROLOGIC ABO: CPT | Performed by: OBSTETRICS & GYNECOLOGY

## 2022-07-01 PROCEDURE — 25010000002 BETAMETHASONE ACET & SOD PHOS PER 4 MG: Performed by: OBSTETRICS & GYNECOLOGY

## 2022-07-01 PROCEDURE — 59025 FETAL NON-STRESS TEST: CPT

## 2022-07-01 PROCEDURE — 59025 FETAL NON-STRESS TEST: CPT | Performed by: OBSTETRICS & GYNECOLOGY

## 2022-07-01 PROCEDURE — 80050 GENERAL HEALTH PANEL: CPT | Performed by: OBSTETRICS & GYNECOLOGY

## 2022-07-01 PROCEDURE — 83036 HEMOGLOBIN GLYCOSYLATED A1C: CPT | Performed by: OBSTETRICS & GYNECOLOGY

## 2022-07-01 PROCEDURE — 25010000002 ONDANSETRON PER 1 MG: Performed by: OBSTETRICS & GYNECOLOGY

## 2022-07-01 PROCEDURE — 84439 ASSAY OF FREE THYROXINE: CPT | Performed by: OBSTETRICS & GYNECOLOGY

## 2022-07-01 PROCEDURE — 99222 1ST HOSP IP/OBS MODERATE 55: CPT | Performed by: OBSTETRICS & GYNECOLOGY

## 2022-07-01 PROCEDURE — 82962 GLUCOSE BLOOD TEST: CPT

## 2022-07-01 PROCEDURE — 99024 POSTOP FOLLOW-UP VISIT: CPT | Performed by: OBSTETRICS & GYNECOLOGY

## 2022-07-01 PROCEDURE — 86850 RBC ANTIBODY SCREEN: CPT | Performed by: OBSTETRICS & GYNECOLOGY

## 2022-07-01 PROCEDURE — 84156 ASSAY OF PROTEIN URINE: CPT | Performed by: OBSTETRICS & GYNECOLOGY

## 2022-07-01 PROCEDURE — U0003 INFECTIOUS AGENT DETECTION BY NUCLEIC ACID (DNA OR RNA); SEVERE ACUTE RESPIRATORY SYNDROME CORONAVIRUS 2 (SARS-COV-2) (CORONAVIRUS DISEASE [COVID-19]), AMPLIFIED PROBE TECHNIQUE, MAKING USE OF HIGH THROUGHPUT TECHNOLOGIES AS DESCRIBED BY CMS-2020-01-R: HCPCS | Performed by: OBSTETRICS & GYNECOLOGY

## 2022-07-01 PROCEDURE — 86901 BLOOD TYPING SEROLOGIC RH(D): CPT | Performed by: OBSTETRICS & GYNECOLOGY

## 2022-07-01 RX ORDER — FAMOTIDINE 10 MG/ML
20 INJECTION, SOLUTION INTRAVENOUS ONCE AS NEEDED
Status: COMPLETED | OUTPATIENT
Start: 2022-07-01 | End: 2022-07-02

## 2022-07-01 RX ORDER — ACETAMINOPHEN 325 MG/1
650 TABLET ORAL ONCE
Status: DISCONTINUED | OUTPATIENT
Start: 2022-07-02 | End: 2022-07-02 | Stop reason: HOSPADM

## 2022-07-01 RX ORDER — PRENATAL VIT/IRON FUM/FOLIC AC 27MG-0.8MG
1 TABLET ORAL DAILY
Status: DISCONTINUED | OUTPATIENT
Start: 2022-07-01 | End: 2022-07-02

## 2022-07-01 RX ORDER — NIFEDIPINE 10 MG/1
10 CAPSULE ORAL ONCE
Status: COMPLETED | OUTPATIENT
Start: 2022-07-01 | End: 2022-07-01

## 2022-07-01 RX ORDER — LEVOTHYROXINE SODIUM 0.03 MG/1
25 TABLET ORAL
Status: DISCONTINUED | OUTPATIENT
Start: 2022-07-02 | End: 2022-07-02

## 2022-07-01 RX ORDER — SODIUM CHLORIDE 0.9 % (FLUSH) 0.9 %
10 SYRINGE (ML) INJECTION EVERY 12 HOURS SCHEDULED
Status: DISCONTINUED | OUTPATIENT
Start: 2022-07-01 | End: 2022-07-01

## 2022-07-01 RX ORDER — SODIUM CHLORIDE 0.9 % (FLUSH) 0.9 %
10 SYRINGE (ML) INJECTION AS NEEDED
Status: DISCONTINUED | OUTPATIENT
Start: 2022-07-01 | End: 2022-07-01

## 2022-07-01 RX ORDER — SODIUM CHLORIDE 0.9 % (FLUSH) 0.9 %
10 SYRINGE (ML) INJECTION EVERY 12 HOURS SCHEDULED
Status: DISCONTINUED | OUTPATIENT
Start: 2022-07-01 | End: 2022-07-02

## 2022-07-01 RX ORDER — SODIUM CHLORIDE 0.9 % (FLUSH) 0.9 %
10 SYRINGE (ML) INJECTION AS NEEDED
Status: DISCONTINUED | OUTPATIENT
Start: 2022-07-01 | End: 2022-07-02

## 2022-07-01 RX ORDER — CEFAZOLIN SODIUM IN 0.9 % NACL 3 G/100 ML
3 INTRAVENOUS SOLUTION, PIGGYBACK (ML) INTRAVENOUS ONCE
Status: COMPLETED | OUTPATIENT
Start: 2022-07-02 | End: 2022-07-02

## 2022-07-01 RX ORDER — ACETAMINOPHEN 500 MG
1000 TABLET ORAL ONCE
Status: COMPLETED | OUTPATIENT
Start: 2022-07-02 | End: 2022-07-01

## 2022-07-01 RX ORDER — BETAMETHASONE SODIUM PHOSPHATE AND BETAMETHASONE ACETATE 3; 3 MG/ML; MG/ML
12 INJECTION, SUSPENSION INTRA-ARTICULAR; INTRALESIONAL; INTRAMUSCULAR; SOFT TISSUE EVERY 24 HOURS
Status: DISCONTINUED | OUTPATIENT
Start: 2022-07-01 | End: 2022-07-02

## 2022-07-01 RX ORDER — ASPIRIN 81 MG/1
81 TABLET, CHEWABLE ORAL DAILY
Status: DISCONTINUED | OUTPATIENT
Start: 2022-07-01 | End: 2022-07-02

## 2022-07-01 RX ORDER — ONDANSETRON 2 MG/ML
4 INJECTION INTRAMUSCULAR; INTRAVENOUS EVERY 6 HOURS PRN
Status: DISCONTINUED | OUTPATIENT
Start: 2022-07-01 | End: 2022-07-02

## 2022-07-01 RX ORDER — ONDANSETRON 4 MG/1
4 TABLET, FILM COATED ORAL NIGHTLY PRN
Status: DISCONTINUED | OUTPATIENT
Start: 2022-07-01 | End: 2022-07-02

## 2022-07-01 RX ADMIN — SODIUM CHLORIDE, POTASSIUM CHLORIDE, SODIUM LACTATE AND CALCIUM CHLORIDE 1000 ML: 600; 310; 30; 20 INJECTION, SOLUTION INTRAVENOUS at 21:52

## 2022-07-01 RX ADMIN — Medication 10 ML: at 21:35

## 2022-07-01 RX ADMIN — NIFEDIPINE 10 MG: 10 CAPSULE ORAL at 22:18

## 2022-07-01 RX ADMIN — ACETAMINOPHEN 1000 MG: 500 TABLET ORAL at 23:59

## 2022-07-01 RX ADMIN — Medication 1 TABLET: at 21:35

## 2022-07-01 RX ADMIN — ONDANSETRON 4 MG: 2 INJECTION INTRAMUSCULAR; INTRAVENOUS at 22:06

## 2022-07-01 RX ADMIN — ASPIRIN 81 MG: 81 TABLET, CHEWABLE ORAL at 21:35

## 2022-07-01 RX ADMIN — BETAMETHASONE SODIUM PHOSPHATE AND BETAMETHASONE ACETATE 12 MG: 3; 3 INJECTION, SUSPENSION INTRA-ARTICULAR; INTRALESIONAL; INTRAMUSCULAR at 18:23

## 2022-07-01 NOTE — H&P
[facility]   HISTORY AND PHYSICAL    Patient Name: Beth Gaviria  : 2000  MRN: [id number]  Primary Care Physician:  Sneha Cota APRN  Date of admission: 2022    Subjective   Subjective     Chief Complaint: Sent from office for significant hypertension in pregnancy     History of Present Illness     22 y.o.  @ 34w3d with Di/Di twin gestation.   Presented today for prenatal visit and noted to have elevated Blood pressure of 147/101 and 1+ proteinuria on dip. Complaining of headache for last 3 days as well.     Pregnancy complicated by   - Di/Di twin gestation. - MFM following for number of reasons, growth and BPP last week showed A - 6# - 79% overall, A/C 94% - Breech, anterior placenta and MVP of 9.0 cm, (polyhydramnios), B - 6#10oz, 90% overall, A/C > 99%, Breech, anterior placenta and MVP of 11.7 cm (polyhydramnios)   - Hypertension - has had prior mild elevations in BP all along - so suspect chronic in nature, no medication. Today with significant up tick in her BP and with headache and proteinuria along with multiple other risk factors - high risk for superimposed pre-eclampsia   - Gest DM, diet controlled. Log not available and reports fair control with fasting < 90 and 2 hour post meals < 140. But given both macrosomia and polyhydramnios this has always been in question.   - Maternal morbid obesity - Pre pregnancy BMI > 40 so at risk   - hypothyroid (check labs with admission)   - Prior  - plan repeat with delivery.     Review of Systems   Constitutional: Negative for fever.   Eyes: Negative for visual disturbance.   Gastrointestinal: Negative for abdominal pain.   Genitourinary: Negative for dysuria, pelvic pain and vaginal bleeding.   Neurological: Positive for headaches.   All other systems reviewed and are negative.       Personal History     Past Medical History:   Diagnosis Date   • ADHD (attention deficit hyperactivity disorder)    • Anxiety    • Chronic hypertension      not issues currently, previously on labatelol   • Depression    • Dichorionic diamniotic twin pregnancy in second trimester 2022   • Gestational diabetes 2018   • Hypothyroid in pregnancy, antepartum, third trimester 2022   • Insomnia    • PONV (postoperative nausea and vomiting)    • Preeclampsia 2018   • Varicella      OB History    Para Term  AB Living   2 1 1     1   SAB IAB Ectopic Molar Multiple Live Births             1      # Outcome Date GA Lbr Prasanna/2nd Weight Sex Delivery Anes PTL Lv   2 Current            1 Term 18 38w4d  4475 g (9 lb 13.9 oz) M CS-Classical Spinal N DINA      Complications: Decreased fetal movement, LGA (large for gestational age) fetus affecting management of mother, GDM (gestational diabetes mellitus), Preeclampsia, Hypertension     Past Surgical History:   Procedure Laterality Date   •  SECTION     • CHOLECYSTECTOMY     • LAPAROSCOPIC CHOLECYSTECTOMY  2018   • TONSILLECTOMY     • WISDOM TOOTH EXTRACTION         Family History: family history includes Breast cancer in her maternal grandmother; Cancer in her father and maternal grandmother; Coronary artery disease in her mother; Deep vein thrombosis in her maternal uncle; Diabetes in her father and mother; Heart attack in her father and paternal grandmother; Heart disease in her mother; Hypertension in her father and mother; Stroke in her paternal grandmother. Otherwise pertinent FHx was reviewed and not pertinent to current issue.    Social History:  reports that she quit smoking about 4 years ago. Her smoking use included cigarettes, electronic cigarette, cigarettes, and electronic cigarette. She started smoking about 5 years ago. She has a 0.50 pack-year smoking history. She has never used smokeless tobacco. She reports previous alcohol use. She reports previous drug use.    Home Medications:  Blood Glucose Test, FreeStyle Lite, Lancets, Prenatal, aspirin, glyburide, levothyroxine, and  ondansetron    Allergies:  Allergies   Allergen Reactions   • Buspirone Swelling and Other (See Comments)     SERVE SWELLING IN THE FACE PER PT  Heart palpitations   • Lamotrigine Rash and Hives   • Norelgestromin-Eth Estradiol Swelling   • Oxcarbazepine Rash   • Amphetamine-Dextroamphetamine Palpitations   • Dicyclomine Rash   • Metoclopramide Irritability and Other (See Comments)     Causes agitation and aggression       Objective    Objective     Vitals:   BP: (147)/(101) 147/101    Physical Exam  Vitals reviewed.   Constitutional:       General: She is not in acute distress.     Appearance: She is well-developed. She is obese.   HENT:      Head: Normocephalic and atraumatic.   Eyes:      General:         Right eye: No discharge.         Left eye: No discharge.      Conjunctiva/sclera: Conjunctivae normal.   Neck:      Thyroid: No thyromegaly.   Cardiovascular:      Rate and Rhythm: Normal rate and regular rhythm.      Heart sounds: Normal heart sounds. No murmur heard.  Pulmonary:      Effort: Pulmonary effort is normal. No respiratory distress.      Breath sounds: Normal breath sounds.   Abdominal:      General: There is distension (fundal height > 36 cm ).      Palpations: Abdomen is soft.      Tenderness: There is no abdominal tenderness.   Musculoskeletal:         General: Normal range of motion.      Cervical back: Normal range of motion and neck supple.      Right lower leg: Edema present.      Left lower leg: Edema (1+ edema ) present.   Lymphadenopathy:      Cervical: No cervical adenopathy.   Skin:     General: Skin is warm and dry.      Findings: No rash.   Neurological:      Mental Status: She is alert and oriented to person, place, and time.      Deep Tendon Reflexes: Reflexes normal.   Psychiatric:         Behavior: Behavior normal.         Thought Content: Thought content normal.         Judgment: Judgment normal.         Result Review    Result Review:        Assessment & Plan   Assessment / Plan      Brief Patient Summary:  Beth Gaviria is a 22 y.o. female  @ 34w3d with Di/Di twin gestation   1) Di/Di twins - See HPI - Showing both macrosomia and polyhydramnios and today reports dec FM for A. Per MFM suggest we see weekly for BPP - but showed to office just today (last BPP last week) - so will get BPP during admission, start with NST today   2) Hypertension - either with exacerbation or possibly super-imposed pre-eclampsia.  Given issues starting BMZ in case need to deliver earlier and have  consult. Work of with PIH labs, P/C and 24 hour urine for final confirmation while get serial BP along the way. Per typical - delivery would be based on severe range BP, elevated creatinine, fetal distress or other features consistent with severe pre-eclampsia.   3) Gest DM, diet controlled. Have to consider not well controlled given macrosomia and Polyhydramnios. Checking HgA1c and will continue to check serial BS fasting and 2 hours after meals to confirm her control   4) Hypothyroid in pregnancy, recent medication adjustment - so repeat TSH, T4   5) Maternal morbid obesity - plan SCD for DVT PPx. Interval gain likely edema (7# in about one week).   6) Prior , both breech last check. So delivery by repeat likely     Active Hospital Problems:  Active Hospital Problems    Diagnosis    • **Chronic hypertension with exacerbation during pregnancy in third trimester    • Dichorionic diamniotic twin pregnancy in third trimester    • Diet controlled gestational diabetes mellitus (GDM) in third trimester    • Macrosomia of fetus affecting management of mother in third trimester    • Polyhydramnios in third trimester    • Maternal morbid obesity in third trimester, antepartum (HCC)    • Previous  delivery, antepartum    • Hypothyroid in pregnancy, antepartum, third trimester    • Decreased fetal movements in third trimester      Plan:   PIH labs (CBC, CMP, P/C urine and 24 hour urine)   BMZ for FLM     consult in case need to deliver early   Check BS QID   HgA1c  TSH, T4 to reassess thyroid   BPP due soon   BID NST   Deliver for severe PIH features   Serial BP   SCD for VTE PPx     DVT prophylaxis:  Mechanical DVT prophylaxis orders are present.    CODE STATUS:       Admission Status:  I believe this patient meets inpatient status.    Luis Alberto Asher MD  2022   17:56 EDT

## 2022-07-01 NOTE — NON STRESS TEST
"Beth Gaviria, reanna  at 34w3d with an ABELARDO of 2022, by Ultrasound, was seen at [facility]OR [facility] for a nonstress test.    Chief Complaint   Patient presents with   • Elevated Blood Pressure     Patient is a direct antepartum admit from the office for elevated blood pressures, PE workup, monitoring and testing. Patient reports irregular contractions, no vaginal bleeding or leaking of fluid. Patient reports decreased fetal movement of baby \"A\" and good fetal movement of baby \"B\"       Patient Active Problem List   Diagnosis   • Anxiety   • Depression   • H/O gestational diabetes mellitus, not currently pregnant   • Menorrhagia with regular cycle   • Personality disorder ([facility])   • Prediabetes   • Seasonal allergies   • Morbid obesity with BMI of 45.0-49.9, adult ([facility])   • Previous  delivery, antepartum   • Pregnancy   • Twin gestation, dichorionic/diamniotic (two placentae, two amniotic sacs)   • Gestational diabetes mellitus (GDM) controlled on oral hypoglycemic drug, antepartum   • Pre-existing essential hypertension complicating pregnancy, third trimester   • Dichorionic diamniotic twin pregnancy in third trimester   • Chronic hypertension with exacerbation during pregnancy in third trimester   • Diet controlled gestational diabetes mellitus (GDM) in third trimester   • Macrosomia of fetus affecting management of mother in third trimester   • Polyhydramnios in third trimester   • Maternal morbid obesity in third trimester, antepartum ([facility])   • Previous  delivery, antepartum   • Hypothyroid in pregnancy, antepartum, third trimester   • Decreased fetal movements in third trimester       Start Time: 173  Stop Time: 182    Interpretation A  Nonstress Test Interpretation A: Reactive  Interpretation B  Nonstress Test Interpretation B: Reactive            "

## 2022-07-01 NOTE — CONSULTS
PRENATAL CONSULTATION NOTE     DATE: 2022  MRN: [id number]      Patient Name: Beth Gaviria  YOB: 2000    Principal Problem:    Chronic hypertension with exacerbation during pregnancy in third trimester  Active Problems:    Dichorionic diamniotic twin pregnancy in third trimester    Diet controlled gestational diabetes mellitus (GDM) in third trimester    Macrosomia of fetus affecting management of mother in third trimester    Polyhydramnios in third trimester    Maternal morbid obesity in third trimester, antepartum (HCC)    Previous  delivery, antepartum    Hypothyroid in pregnancy, antepartum, third trimester    Decreased fetal movements in third trimester        Requesting Physician:  Dr. Asher    EDC: 2022, by Ultrasound    GA: 34w3d    Estimated fetal weight: Baby A: 79%, AC 94%, baby B: 90%, AC >99%macrosomia of both twins noted    Reason for Consultation: potential premature twins at 34 weeks gestation    Maternal History: 22 y.o.  with chronic HTN, pre-eclampsia, gestational DM, macrosomia, polyhydramnios, morbid obesity, hypothyroid, and decreased fetal movement      Medications:  No recently discontinued medications to reconcile  PNV, aspirin, glyburide, levothyroxine, and ondansetron at home  BMZ x 1    Consult:  maternal grandmother and mother available for discussion.  We reviewed the fetal and  aspects of the above conditions to include: respiratory distress syndrome, transient tachypnea of the , beneficial effects of steroids, feeding difficulty, temperature instability, jaundice and hypoglycemia    Plan for Resuscitation: full resuscitation    Offered estimation of prognosis of potential length of infant hospitalization.     We will plan to attend delivery when requested. I discussed the importance of providing human milk for all infants, especially premature infants. The patient does plan on providing pumped breast milk and/or nursing when  appropriate.    I also reviewed policies and procedures for NICU/ICN admission and reviewed structure and function of [facility] [place] [place] - Neonatology at [facility].    Thank you for allowing us to participate in the care of this patient. [facility] [place] [place] is always available for follow-up consultation as indicated. Please do not hesitate to call for additional questions or issues.    Additional Comments: Mother plans to pump and bottle feed MBM or formula. Also discussed availability of donor milk and risk/benefits.     JUAN Herrera   Nurse Practitioner  [facility] - Neonatology  [facility]  22 @ 18:49 EDT    Consult note reviewed and electronically signed on 2022 at 18:49 EDT    INITIAL HOSPITAL CONSULT:  A total of 40 minutes were spent on counseling and coordination of care including discussion with physicians and nursing, and reviewing the findings and recommendations with the patient and her family of which 20 minutes were spent face-to-face with the patient during this encounter.    Thank you for requesting this consult. Please contact the [facility] [place] [place] on-call  Provider for any further questions/concerns, by calling the NICU at extension [phone].        DISCLAIMER:       “As of 2021, as required by the Federal 21st Century Cures Act, medical records (including provider notes and laboratory/imaging results) are to be made available to patients and/or their designees as soon as the documents are signed/resulted. While the intention is to ensure transparency and to engage patients in their healthcare, this immediate access may create unintended consequences because this document uses language intended for communication between medical providers for interpretation with the entirety of the patient’s clinical picture in mind. It is recommended that patients and/or their designees review all available information with their primary or specialist providers for explanation and to  avoid misinterpretation of this information.”

## 2022-07-01 NOTE — PROGRESS NOTES
"OB follow up     Beth Gaviria is a 22 y.o.  34w3d being seen today for her obstetrical visit.  Patient reports headache for the past 3 days, swelling in feet. . Fetal movement: normal.    Her prenatal care is complicated by (and status): Di/ Di twins, Maternal morbid obesity, Prior , Gest DM (diet controlled), Hypothyroid - Decreased FM \"A\"    Review of Systems  Cramping/contractions : none   Vaginal bleeding: none   Fetal movement good with B, decreased A     BP (!) 147/101   Wt 123 kg (271 lb)   LMP 10/01/2021   BMI 46.52 kg/m²     FHT: 124 and 146 BPM   Uterine Size: 36 cm       Assessment    Diagnoses and all orders for this visit:    1. Dichorionic diamniotic twin pregnancy in third trimester (Primary)    2. Chronic hypertension with exacerbation during pregnancy in third trimester    3. Gestational diabetes mellitus (GDM) controlled on oral hypoglycemic drug, antepartum    4. Previous  delivery, antepartum    5. Hypothyroidism affecting pregnancy in third trimester    6. Maternal morbid obesity in third trimester, antepartum (HCC)    7. High-risk pregnancy in third trimester        1) pregnancy at 34w3d   2) Di/Di twins   Complicated by macrosomia x 2   Polyhydramnios as well.   Most recent evaluation last week with MFM, suggested weekly BPP with us (just saw me today) so has not done this week??  3) Gest DM, diet controlled per patient.   Did not bring log (reports issue with phone)   Will check HgA1c with admission   4) Prior    Plan repeat with delivery   5) Obesity   Pre pregnancy BMI > 40   Should be getting ANT for this alone.   6) Hypothyroid   Need to repeat testing.   7) Hypertension - worsening vs superimposed pre-eclampsia   New onset with headache  Repeat BP with me was 140/100  DTRs 2+, no clonus   Given multiple other issues/risk factors - sent to L&D to evaluate (antepartum admission)           Plan    Reviewed this stage of pregnancy  Problem list updated   To " L&D for antepartum admit for evaluation of PIH     Luis Alberto Asher MD   7/1/2022  17:13 EDT

## 2022-07-02 ENCOUNTER — ANESTHESIA (OUTPATIENT)
Dept: LABOR AND DELIVERY | Facility: HOSPITAL | Age: 22
End: 2022-07-02

## 2022-07-02 PROBLEM — Z98.891 S/P CESAREAN SECTION: Status: ACTIVE | Noted: 2022-07-02

## 2022-07-02 LAB
ATMOSPHERIC PRESS: 747.6 MMHG
ATMOSPHERIC PRESS: 748.7 MMHG
ATMOSPHERIC PRESS: 749 MMHG
ATMOSPHERIC PRESS: 749.4 MMHG
BASE EXCESS BLDCOA CALC-SCNC: -5 MMOL/L
BASE EXCESS BLDCOA CALC-SCNC: -8.8 MMOL/L
BASE EXCESS BLDCOV CALC-SCNC: -10 MMOL/L (ref -30–30)
BASE EXCESS BLDCOV CALC-SCNC: -6.5 MMOL/L (ref -30–30)
BDY SITE: ABNORMAL
COLLECT TME SMN: ABNORMAL
COLLECT TME SMN: ABNORMAL
GAS FLOW AIRWAY: 2 LPM
GLUCOSE BLDC GLUCOMTR-MCNC: 107 MG/DL (ref 70–130)
GLUCOSE BLDC GLUCOMTR-MCNC: 119 MG/DL (ref 70–130)
GLUCOSE BLDC GLUCOMTR-MCNC: 157 MG/DL (ref 70–130)
GLUCOSE BLDC GLUCOMTR-MCNC: 163 MG/DL (ref 70–130)
HCO3 BLDCOA-SCNC: 22.3 MMOL/L (ref 22–28)
HCO3 BLDCOA-SCNC: 22.7 MMOL/L (ref 22–28)
HCO3 BLDCOV-SCNC: 20.4 MMOL/L
HCO3 BLDCOV-SCNC: 20.9 MMOL/L
MODALITY: ABNORMAL
NOTE: ABNORMAL
PCO2 BLDCOA: 51.1 MMHG (ref 43–63)
PCO2 BLDCOA: 71.3 MMHG (ref 43–63)
PCO2 BLDCOV: 44.5 MM HG (ref 35–51.3)
PCO2 BLDCOV: 66.8 MM HG (ref 35–51.3)
PH BLDCOA: 7.1 PH UNITS (ref 7.18–7.34)
PH BLDCOA: 7.26 PH UNITS (ref 7.18–7.34)
PH BLDCOV: 7.1 PH UNITS (ref 7.26–7.4)
PH BLDCOV: 7.27 PH UNITS (ref 7.26–7.4)
PO2 BLDCOA: 15 MMHG (ref 12–26)
PO2 BLDCOA: 17.2 MMHG (ref 12–26)
PO2 BLDCOV: 16.7 MM HG (ref 19–39)
PO2 BLDCOV: 20 MM HG (ref 19–39)
SAO2 % BLDCOA: 11.1 % (ref 92–99)
SAO2 % BLDCOA: 13.4 % (ref 92–99)
SAO2 % BLDCOA: 18.8 % (ref 92–99)
SAO2 % BLDCOA: 25.5 % (ref 92–99)
SAO2 % BLDCOV: ABNORMAL %
SAO2 % BLDCOV: ABNORMAL %

## 2022-07-02 PROCEDURE — 82803 BLOOD GASES ANY COMBINATION: CPT

## 2022-07-02 PROCEDURE — 82962 GLUCOSE BLOOD TEST: CPT

## 2022-07-02 PROCEDURE — 25010000002 DIPHENHYDRAMINE PER 50 MG: Performed by: NURSE ANESTHETIST, CERTIFIED REGISTERED

## 2022-07-02 PROCEDURE — 59515 CESAREAN DELIVERY: CPT | Performed by: OBSTETRICS & GYNECOLOGY

## 2022-07-02 PROCEDURE — 88307 TISSUE EXAM BY PATHOLOGIST: CPT

## 2022-07-02 PROCEDURE — 25010000002 PHENYLEPHRINE 10 MG/ML SOLUTION: Performed by: NURSE ANESTHETIST, CERTIFIED REGISTERED

## 2022-07-02 PROCEDURE — 25010000002 CEFAZOLIN PER 500 MG: Performed by: OBSTETRICS & GYNECOLOGY

## 2022-07-02 PROCEDURE — 25010000002 PROPOFOL 10 MG/ML EMULSION: Performed by: NURSE ANESTHETIST, CERTIFIED REGISTERED

## 2022-07-02 PROCEDURE — 25010000002 AZITHROMYCIN PER 500 MG: Performed by: OBSTETRICS & GYNECOLOGY

## 2022-07-02 PROCEDURE — 25010000002 MORPHINE PER 10 MG: Performed by: ANESTHESIOLOGY

## 2022-07-02 PROCEDURE — 25010000002 MORPHINE PER 10 MG: Performed by: NURSE ANESTHETIST, CERTIFIED REGISTERED

## 2022-07-02 RX ORDER — LEVOTHYROXINE SODIUM 0.03 MG/1
25 TABLET ORAL DAILY
Status: DISCONTINUED | OUTPATIENT
Start: 2022-07-02 | End: 2022-07-03

## 2022-07-02 RX ORDER — SODIUM CHLORIDE, SODIUM LACTATE, POTASSIUM CHLORIDE, CALCIUM CHLORIDE 600; 310; 30; 20 MG/100ML; MG/100ML; MG/100ML; MG/100ML
125 INJECTION, SOLUTION INTRAVENOUS CONTINUOUS
Status: DISCONTINUED | OUTPATIENT
Start: 2022-07-02 | End: 2022-07-03

## 2022-07-02 RX ORDER — CARBOPROST TROMETHAMINE 250 UG/ML
250 INJECTION, SOLUTION INTRAMUSCULAR AS NEEDED
Status: DISCONTINUED | OUTPATIENT
Start: 2022-07-02 | End: 2022-07-02 | Stop reason: HOSPADM

## 2022-07-02 RX ORDER — ONDANSETRON 2 MG/ML
4 INJECTION INTRAMUSCULAR; INTRAVENOUS ONCE AS NEEDED
Status: DISCONTINUED | OUTPATIENT
Start: 2022-07-02 | End: 2022-07-02

## 2022-07-02 RX ORDER — PHYTONADIONE 1 MG/.5ML
INJECTION, EMULSION INTRAMUSCULAR; INTRAVENOUS; SUBCUTANEOUS
Status: ACTIVE
Start: 2022-07-02 | End: 2022-07-02

## 2022-07-02 RX ORDER — PROMETHAZINE HYDROCHLORIDE 12.5 MG/1
12.5 SUPPOSITORY RECTAL EVERY 6 HOURS PRN
Status: DISCONTINUED | OUTPATIENT
Start: 2022-07-02 | End: 2022-07-06 | Stop reason: HOSPADM

## 2022-07-02 RX ORDER — MORPHINE SULFATE 2 MG/ML
2 INJECTION, SOLUTION INTRAMUSCULAR; INTRAVENOUS
Status: DISCONTINUED | OUTPATIENT
Start: 2022-07-02 | End: 2022-07-02

## 2022-07-02 RX ORDER — ERYTHROMYCIN 5 MG/G
OINTMENT OPHTHALMIC
Status: ACTIVE
Start: 2022-07-02 | End: 2022-07-02

## 2022-07-02 RX ORDER — PHENYLEPHRINE HYDROCHLORIDE 10 MG/ML
INJECTION INTRAVENOUS AS NEEDED
Status: DISCONTINUED | OUTPATIENT
Start: 2022-07-02 | End: 2022-07-02 | Stop reason: SURG

## 2022-07-02 RX ORDER — BUPIVACAINE HYDROCHLORIDE 7.5 MG/ML
INJECTION, SOLUTION EPIDURAL; RETROBULBAR
Status: COMPLETED | OUTPATIENT
Start: 2022-07-02 | End: 2022-07-02

## 2022-07-02 RX ORDER — DIPHENHYDRAMINE HCL 25 MG
25 CAPSULE ORAL EVERY 4 HOURS PRN
Status: DISCONTINUED | OUTPATIENT
Start: 2022-07-02 | End: 2022-07-02

## 2022-07-02 RX ORDER — IBUPROFEN 800 MG/1
800 TABLET ORAL EVERY 8 HOURS PRN
Status: DISCONTINUED | OUTPATIENT
Start: 2022-07-02 | End: 2022-07-06 | Stop reason: HOSPADM

## 2022-07-02 RX ORDER — METHYLERGONOVINE MALEATE 0.2 MG/ML
200 INJECTION INTRAVENOUS ONCE AS NEEDED
Status: DISCONTINUED | OUTPATIENT
Start: 2022-07-02 | End: 2022-07-02 | Stop reason: HOSPADM

## 2022-07-02 RX ORDER — MISOPROSTOL 200 UG/1
800 TABLET ORAL AS NEEDED
Status: DISCONTINUED | OUTPATIENT
Start: 2022-07-02 | End: 2022-07-02 | Stop reason: HOSPADM

## 2022-07-02 RX ORDER — OXYTOCIN-SODIUM CHLORIDE 0.9% IV SOLN 30 UNIT/500ML 30-0.9/5 UT/ML-%
999 SOLUTION INTRAVENOUS ONCE
Status: COMPLETED | OUTPATIENT
Start: 2022-07-02 | End: 2022-07-02

## 2022-07-02 RX ORDER — HYDROMORPHONE HYDROCHLORIDE 1 MG/ML
0.5 INJECTION, SOLUTION INTRAMUSCULAR; INTRAVENOUS; SUBCUTANEOUS
Status: DISCONTINUED | OUTPATIENT
Start: 2022-07-02 | End: 2022-07-02 | Stop reason: HOSPADM

## 2022-07-02 RX ORDER — ENOXAPARIN SODIUM 100 MG/ML
40 INJECTION SUBCUTANEOUS EVERY 12 HOURS SCHEDULED
Status: DISCONTINUED | OUTPATIENT
Start: 2022-07-03 | End: 2022-07-06 | Stop reason: HOSPADM

## 2022-07-02 RX ORDER — HYDROXYZINE 50 MG/1
50 TABLET, FILM COATED ORAL EVERY 6 HOURS PRN
Status: DISCONTINUED | OUTPATIENT
Start: 2022-07-02 | End: 2022-07-06 | Stop reason: HOSPADM

## 2022-07-02 RX ORDER — PRENATAL VIT/IRON FUM/FOLIC AC 27MG-0.8MG
1 TABLET ORAL DAILY
Status: DISCONTINUED | OUTPATIENT
Start: 2022-07-02 | End: 2022-07-06 | Stop reason: HOSPADM

## 2022-07-02 RX ORDER — OXYTOCIN-SODIUM CHLORIDE 0.9% IV SOLN 30 UNIT/500ML 30-0.9/5 UT/ML-%
125 SOLUTION INTRAVENOUS CONTINUOUS PRN
Status: DISCONTINUED | OUTPATIENT
Start: 2022-07-02 | End: 2022-07-06 | Stop reason: HOSPADM

## 2022-07-02 RX ORDER — PROPOFOL 10 MG/ML
VIAL (ML) INTRAVENOUS AS NEEDED
Status: DISCONTINUED | OUTPATIENT
Start: 2022-07-02 | End: 2022-07-02 | Stop reason: SURG

## 2022-07-02 RX ORDER — NALOXONE HCL 0.4 MG/ML
0.2 VIAL (ML) INJECTION
Status: DISCONTINUED | OUTPATIENT
Start: 2022-07-02 | End: 2022-07-06 | Stop reason: HOSPADM

## 2022-07-02 RX ORDER — OXYTOCIN-SODIUM CHLORIDE 0.9% IV SOLN 30 UNIT/500ML 30-0.9/5 UT/ML-%
250 SOLUTION INTRAVENOUS CONTINUOUS PRN
Status: ACTIVE | OUTPATIENT
Start: 2022-07-02 | End: 2022-07-02

## 2022-07-02 RX ORDER — DIPHENHYDRAMINE HYDROCHLORIDE 50 MG/ML
25 INJECTION INTRAMUSCULAR; INTRAVENOUS EVERY 4 HOURS PRN
Status: DISCONTINUED | OUTPATIENT
Start: 2022-07-02 | End: 2022-07-02

## 2022-07-02 RX ORDER — SIMETHICONE 80 MG
80 TABLET,CHEWABLE ORAL 4 TIMES DAILY PRN
Status: DISCONTINUED | OUTPATIENT
Start: 2022-07-02 | End: 2022-07-06 | Stop reason: HOSPADM

## 2022-07-02 RX ORDER — PROMETHAZINE HYDROCHLORIDE 25 MG/1
25 TABLET ORAL EVERY 6 HOURS PRN
Status: DISCONTINUED | OUTPATIENT
Start: 2022-07-02 | End: 2022-07-06 | Stop reason: HOSPADM

## 2022-07-02 RX ORDER — SODIUM CHLORIDE, SODIUM LACTATE, POTASSIUM CHLORIDE, CALCIUM CHLORIDE 600; 310; 30; 20 MG/100ML; MG/100ML; MG/100ML; MG/100ML
INJECTION, SOLUTION INTRAVENOUS CONTINUOUS PRN
Status: DISCONTINUED | OUTPATIENT
Start: 2022-07-02 | End: 2022-07-02 | Stop reason: SURG

## 2022-07-02 RX ORDER — MORPHINE SULFATE 1 MG/ML
INJECTION, SOLUTION EPIDURAL; INTRATHECAL; INTRAVENOUS
Status: COMPLETED | OUTPATIENT
Start: 2022-07-02 | End: 2022-07-02

## 2022-07-02 RX ORDER — OXYCODONE HYDROCHLORIDE AND ACETAMINOPHEN 5; 325 MG/1; MG/1
1 TABLET ORAL EVERY 4 HOURS PRN
Status: DISCONTINUED | OUTPATIENT
Start: 2022-07-02 | End: 2022-07-06 | Stop reason: HOSPADM

## 2022-07-02 RX ORDER — HYDROCORTISONE 25 MG/G
CREAM TOPICAL 3 TIMES DAILY PRN
Status: DISCONTINUED | OUTPATIENT
Start: 2022-07-02 | End: 2022-07-06 | Stop reason: HOSPADM

## 2022-07-02 RX ADMIN — IBUPROFEN 800 MG: 800 TABLET, FILM COATED ORAL at 03:07

## 2022-07-02 RX ADMIN — OXYTOCIN 125 ML/HR: 10 INJECTION INTRAVENOUS at 02:05

## 2022-07-02 RX ADMIN — OXYCODONE AND ACETAMINOPHEN 1 TABLET: 5; 325 TABLET ORAL at 22:32

## 2022-07-02 RX ADMIN — PHENYLEPHRINE HYDROCHLORIDE 100 MCG: 10 INJECTION INTRAVENOUS at 01:25

## 2022-07-02 RX ADMIN — PHENYLEPHRINE HYDROCHLORIDE 100 MCG: 10 INJECTION INTRAVENOUS at 01:05

## 2022-07-02 RX ADMIN — PHENYLEPHRINE HYDROCHLORIDE 100 MCG: 10 INJECTION INTRAVENOUS at 01:13

## 2022-07-02 RX ADMIN — HYDROXYZINE HYDROCHLORIDE 50 MG: 50 TABLET ORAL at 12:36

## 2022-07-02 RX ADMIN — DIPHENHYDRAMINE HYDROCHLORIDE 25 MG: 50 INJECTION INTRAMUSCULAR; INTRAVENOUS at 03:51

## 2022-07-02 RX ADMIN — PROPOFOL 10 MG: 10 INJECTION, EMULSION INTRAVENOUS at 01:18

## 2022-07-02 RX ADMIN — MORPHINE SULFATE 200 MCG: 1 INJECTION, SOLUTION EPIDURAL; INTRATHECAL; INTRAVENOUS at 00:47

## 2022-07-02 RX ADMIN — PHENYLEPHRINE HYDROCHLORIDE 100 MCG: 10 INJECTION INTRAVENOUS at 00:55

## 2022-07-02 RX ADMIN — PHENYLEPHRINE HYDROCHLORIDE 100 MCG: 10 INJECTION INTRAVENOUS at 01:19

## 2022-07-02 RX ADMIN — BUPIVACAINE HYDROCHLORIDE 1.75 ML: 7.5 INJECTION, SOLUTION EPIDURAL; RETROBULBAR at 00:47

## 2022-07-02 RX ADMIN — PHENYLEPHRINE HYDROCHLORIDE 100 MCG: 10 INJECTION INTRAVENOUS at 01:09

## 2022-07-02 RX ADMIN — PHENYLEPHRINE HYDROCHLORIDE 100 MCG: 10 INJECTION INTRAVENOUS at 00:50

## 2022-07-02 RX ADMIN — OXYTOCIN-SODIUM CHLORIDE 0.9% IV SOLN 30 UNIT/500ML 999 ML/HR: 30-0.9/5 SOLUTION at 01:10

## 2022-07-02 RX ADMIN — SODIUM CHLORIDE, POTASSIUM CHLORIDE, SODIUM LACTATE AND CALCIUM CHLORIDE: 600; 310; 30; 20 INJECTION, SOLUTION INTRAVENOUS at 00:32

## 2022-07-02 RX ADMIN — MORPHINE SULFATE 2 MG: 2 INJECTION, SOLUTION INTRAMUSCULAR; INTRAVENOUS at 02:20

## 2022-07-02 RX ADMIN — PHENYLEPHRINE HYDROCHLORIDE 100 MCG: 10 INJECTION INTRAVENOUS at 00:52

## 2022-07-02 RX ADMIN — PHENYLEPHRINE HYDROCHLORIDE 100 MCG: 10 INJECTION INTRAVENOUS at 01:03

## 2022-07-02 RX ADMIN — PHENYLEPHRINE HYDROCHLORIDE 100 MCG: 10 INJECTION INTRAVENOUS at 00:57

## 2022-07-02 RX ADMIN — PHENYLEPHRINE HYDROCHLORIDE 100 MCG: 10 INJECTION INTRAVENOUS at 01:22

## 2022-07-02 RX ADMIN — PHENYLEPHRINE HYDROCHLORIDE 100 MCG: 10 INJECTION INTRAVENOUS at 00:59

## 2022-07-02 RX ADMIN — PHENYLEPHRINE HYDROCHLORIDE 100 MCG: 10 INJECTION INTRAVENOUS at 01:33

## 2022-07-02 RX ADMIN — FAMOTIDINE 20 MG: 10 INJECTION INTRAVENOUS at 00:00

## 2022-07-02 RX ADMIN — PHENYLEPHRINE HYDROCHLORIDE 100 MCG: 10 INJECTION INTRAVENOUS at 01:07

## 2022-07-02 RX ADMIN — PHENYLEPHRINE HYDROCHLORIDE 100 MCG: 10 INJECTION INTRAVENOUS at 01:01

## 2022-07-02 RX ADMIN — PHENYLEPHRINE HYDROCHLORIDE 100 MCG: 10 INJECTION INTRAVENOUS at 01:11

## 2022-07-02 RX ADMIN — IBUPROFEN 800 MG: 800 TABLET, FILM COATED ORAL at 16:22

## 2022-07-02 RX ADMIN — PHENYLEPHRINE HYDROCHLORIDE 100 MCG: 10 INJECTION INTRAVENOUS at 00:54

## 2022-07-02 RX ADMIN — PHENYLEPHRINE HYDROCHLORIDE 100 MCG: 10 INJECTION INTRAVENOUS at 01:15

## 2022-07-02 RX ADMIN — MORPHINE SULFATE 2 MG: 2 INJECTION, SOLUTION INTRAMUSCULAR; INTRAVENOUS at 03:07

## 2022-07-02 RX ADMIN — PHENYLEPHRINE HYDROCHLORIDE 100 MCG: 10 INJECTION INTRAVENOUS at 01:28

## 2022-07-02 RX ADMIN — SIMETHICONE 80 MG: 80 TABLET, CHEWABLE ORAL at 22:32

## 2022-07-02 RX ADMIN — PHENYLEPHRINE HYDROCHLORIDE 100 MCG: 10 INJECTION INTRAVENOUS at 00:48

## 2022-07-02 RX ADMIN — CEFAZOLIN 3 G: 10 INJECTION, POWDER, FOR SOLUTION INTRAVENOUS at 00:07

## 2022-07-02 RX ADMIN — OXYCODONE AND ACETAMINOPHEN 1 TABLET: 5; 325 TABLET ORAL at 07:50

## 2022-07-02 RX ADMIN — LEVOTHYROXINE SODIUM 25 MCG: 0.03 TABLET ORAL at 12:27

## 2022-07-02 NOTE — ANESTHESIA POSTPROCEDURE EVALUATION
Patient: Beth Gaviria    Procedure Summary     Date: 22 Room / Location:  [facility]  /  [facility]    Anesthesia Start: 31 Anesthesia Stop: 158    Procedure:  SECTION REPEAT (N/A Abdomen) Diagnosis:       Dichorionic diamniotic twin pregnancy in second trimester      Previous  delivery, antepartum      (Dichorionic diamniotic twin pregnancy in second trimester [O30.042])      (Previous  delivery, antepartum [O34.219])    Surgeons: Luis Alberto Asher MD Provider: Kenan Mathew MD    Anesthesia Type: spinal ASA Status: 3          Anesthesia Type: spinal    Vitals  Vitals Value Taken Time   /73 22   Temp 36.4 °C (97.5 °F) 22   Pulse 96 22   Resp 16 22   SpO2 100 % 22           Post Anesthesia Care and Evaluation    Patient location during evaluation: bedside  Patient participation: complete - patient participated  Level of consciousness: awake and alert  Pain management: adequate    Airway patency: patent  Anesthetic complications: No anesthetic complications    Cardiovascular status: acceptable  Respiratory status: acceptable  Hydration status: acceptable    Comments: /73 (BP Location: Left arm, Patient Position: Lying)   Pulse 96   Temp 36.4 °C (97.5 °F) (Oral)   Resp 16   Wt 123 kg (271 lb 12.8 oz)   LMP 10/01/2021   SpO2 100%   Breastfeeding Yes   BMI 46.65 kg/m²

## 2022-07-02 NOTE — ANESTHESIA PREPROCEDURE EVALUATION
Anesthesia Evaluation     history of anesthetic complications: PONV               Airway   Dental      Pulmonary    Cardiovascular     (+) hypertension,       Neuro/Psych  GI/Hepatic/Renal/Endo    (+) morbid obesity,  diabetes mellitus,     Musculoskeletal     Abdominal    Substance History      OB/GYN    (+) Pregnant,         Other                        Anesthesia Plan    ASA 3     spinal     intravenous induction     Anesthetic plan, risks, benefits, and alternatives have been provided, discussed and informed consent has been obtained with: patient.        CODE STATUS:    Level Of Support Discussed With: Patient  Code Status (Patient has no pulse and is not breathing): CPR (Attempt to Resuscitate)  Medical Interventions (Patient has pulse or is breathing): Full Support

## 2022-07-02 NOTE — OP NOTE
Section Full Procedure Note    Indications: previous  section low transverse    Pre-operative Diagnosis: 1) Pregnancy at 34w4d                                               2) Previous  delivery              3) Diamniotic- Dichorionic twin gestation                                              4) Chronic hypertension with exacerbation                                               5) gestational diabetes, diet controlled                                              6) maternal morbid obesity                                               7)  labor                                               8) Macrosomia and polyhydramnios     Post-operative Diagnosis: same    Surgeon: Luis Alberto Asher MD     Assistants: MD Dr. Fabian Owusu assisted me in performing this , he was instrumental in delivery of the babies and with the operation, both with physical assistance completing the steps of the operation from their side of the table as well in assistance in clinical judgement during to procedure.     Anesthesia: Spinal anesthesia    ASA Class: per anesthesia     Indication:   22 y.o.  @ 34w4d with Di/Di twin gestation, sent from office today for pre-eclampsia evaluation with worsening hypertension. Pregnancy complicated by prior , Chronic hypertension with recent increased in blood pressure, gestational diabetes, maternal morbid obesity (pre pregnancy BMI > 40) and macrosomia/polyhydramnios associated with both fetuses. Blood pressure calmed down with supine position in bed. However this evening, early morning started to have regular painful contractions, not resolving with IV hydration and attempted tocolysis with procardia. Terbutaline not done with DM and given > 34 weeks, no magnesium given. After several hours still polo and uncomfortable and cervix had gone from fingertip to 2 cm/50% showing active labor. Decision made to proceed with   for delivery given labor. She has sterilization papers, but during my discussion with R/B/A of that with her, she relented to consider IUD (better menstrual control and reversible given her age).        Procedure Details   The patient was seen in the Holding Room. The risks, benefits, complications, treatment options, and expected outcomes were discussed with the patient.  The patient concurred with the proposed plan, giving informed consent.  The site of surgery properly noted/marked. The patient was taken to Operating Room # 1, identified as Beth Gaviria and the procedure verified as  Delivery. A Time Out was held and the above information confirmed.    After induction of anesthesia, the patient was draped and prepped in the usual sterile manner. Given her habitus we used the Traxi for elevation of the panus and exposure of her prior incision, which was somewhat higher than typical. A Pfannenstiel incision was made and carried down through the subcutaneous tissue to the fascia. Fascial incision was made and extended transversely. The fascia was  from the underlying rectus tissue superiorly and inferiorly. The peritoneum was identified and entered. Peritoneal incision was extended longitudinally.  A low transverse uterine incision was made. Baby A -Delivered from Marques breech presentation was a 7#3oz, Male with Apgar scores of 8 at one minute and 8 at five minutes. Nuchal cord was not noted.  After the umbilical cord was clamped and cut cord blood was obtained for evaluation, including cord gases - CBA - pH 7.10/-8.8, CBV 7.10/-10.0. Baby B - Delivered from footling breech presentation was a 6#8oz, Male with Apgar scores currently pending. Nuchal cord was not noted.  After the umbilical cord was clamped and cut cord blood was obtained for evaluation, including cord gases - CBA -7.26/-5.0, CBV - 7.27/-6.5. The placenta was removed intact and appeared normal for Di/Di twins. The uterine outline,  tubes and ovaries appeared normal. The uterine incision was closed with running locked sutures of 0 Monocryl. A second layer of 0 Monocryl was used in an imbricating fashion. A midline figure of eight was done for hemostasis.  Hemostasis was observed. Lavage was carried out until clear. The peritoneum was closed with 3-0 Plain in a running fashion. The fascia was then reapproximated with running sutures of 0 Vicryl. The subcutaneous tissue was closed given her thickness with a running 3-0 Monocryl. The skin was reapproximated with 4-0 Vicryl in a subcuticular stitch. SANTOS dressing was placed to assist in wound healing.     Instrument, sponge, and needle counts were correct prior the abdominal closure and at the conclusion of the case.     Findings:  Normal pelvic anatomy     Estimated Blood Loss:   900 cc           Drains: Amaral                   Specimens: placenta to pathology                  Complications:  None; patient tolerated the procedure well.           Disposition: PACU for patient and NICU for both babies (on CPAP, but doign well)            Condition: stable    Luis Alberto Asher MD  7/2/2022  01:57 EDT

## 2022-07-02 NOTE — ANESTHESIA PROCEDURE NOTES
Spinal Block      Patient location during procedure: OB  Indication:at surgeon's request and post-op pain management  Performed By  Anesthesiologist: Kenan Mathew MD  Preanesthetic Checklist  Completed: patient identified, IV checked, site marked, risks and benefits discussed, surgical consent, monitors and equipment checked, pre-op evaluation and timeout performed  Spinal Block Prep:  Patient Position:sitting  Sterile Tech:cap, gown, mask, sterile barriers and gloves  Prep:Chloraprep  Patient Monitoring:blood pressure monitoring, continuous pulse oximetry and EKG  Spinal Block Procedure  Approach:midline  Guidance:landmark technique and palpation technique  Location:L4-L5  Needle Type:Quincke  Needle Gauge:22 G  Placement of Spinal needle event:cerebrospinal fluid aspirated  Paresthesia: no  Fluid Appearance:clear  Medications: Morphine PF injection, 200 mcg  bupivacaine PF (MARCAINE) 0.75 % injection, 1.75 mL  Med Administered at 7/2/2022 12:47 AM   Post Assessment  Patient Tolerance:patient tolerated the procedure well with no apparent complications  Complications no

## 2022-07-02 NOTE — PROGRESS NOTES
Day of Delivery  Patient reports she is feeling much better since her episode of syncope this AM.  Her vitals and blood sugar at that time were within normal limits  Amaral in place, draining clear urine. Babies in NICU.   Pain well controlled.    Will monitor blood sugars today and fasting tomorrow due to episode of syncope.  She agrees

## 2022-07-02 NOTE — NURSING NOTE
Aprrox. 0815 RN called into room. Patient in the bathroom with NA. NA stated that patient passed out. Ammonia capsule used. Attempted to transfer patient to wheelchair with assist x2 and patient passed out. Lowered back onto toilet. Once patient regained consciousness, transferred to wheelchair and then to bed. Clot noted on bathroom floor. Vitals taken and charted. Bleeding and fundal check WNL once back in bed. Cold rag applied. Encouraged deep breathing. Patient stated she felt much better once back in bed. Family notified. Call placed to MD.

## 2022-07-02 NOTE — LACTATION NOTE
This note was copied from a baby's chart.  Mom has 34week twin boys in NICU. RN helped Mom pump with HGP this afternoon. Mom in NICU at present.

## 2022-07-03 LAB
BASOPHILS # BLD AUTO: 0.03 10*3/MM3 (ref 0–0.2)
BASOPHILS NFR BLD AUTO: 0.2 % (ref 0–1.5)
DEPRECATED RDW RBC AUTO: 42.3 FL (ref 37–54)
EOSINOPHIL # BLD AUTO: 0.03 10*3/MM3 (ref 0–0.4)
EOSINOPHIL NFR BLD AUTO: 0.2 % (ref 0.3–6.2)
ERYTHROCYTE [DISTWIDTH] IN BLOOD BY AUTOMATED COUNT: 15.5 % (ref 12.3–15.4)
GLUCOSE BLDC GLUCOMTR-MCNC: 116 MG/DL (ref 70–130)
HCT VFR BLD AUTO: 25.7 % (ref 34–46.6)
HGB BLD-MCNC: 8.5 G/DL (ref 12–15.9)
IMM GRANULOCYTES # BLD AUTO: 0.27 10*3/MM3 (ref 0–0.05)
IMM GRANULOCYTES NFR BLD AUTO: 1.9 % (ref 0–0.5)
LYMPHOCYTES # BLD AUTO: 2.72 10*3/MM3 (ref 0.7–3.1)
LYMPHOCYTES NFR BLD AUTO: 19.3 % (ref 19.6–45.3)
MCH RBC QN AUTO: 25.5 PG (ref 26.6–33)
MCHC RBC AUTO-ENTMCNC: 33.1 G/DL (ref 31.5–35.7)
MCV RBC AUTO: 77.2 FL (ref 79–97)
MONOCYTES # BLD AUTO: 1.29 10*3/MM3 (ref 0.1–0.9)
MONOCYTES NFR BLD AUTO: 9.1 % (ref 5–12)
NEUTROPHILS NFR BLD AUTO: 69.3 % (ref 42.7–76)
NEUTROPHILS NFR BLD AUTO: 9.78 10*3/MM3 (ref 1.7–7)
NRBC BLD AUTO-RTO: 0.1 /100 WBC (ref 0–0.2)
PLATELET # BLD AUTO: 258 10*3/MM3 (ref 140–450)
PMV BLD AUTO: 11.3 FL (ref 6–12)
RBC # BLD AUTO: 3.33 10*6/MM3 (ref 3.77–5.28)
WBC NRBC COR # BLD: 14.12 10*3/MM3 (ref 3.4–10.8)

## 2022-07-03 PROCEDURE — 85025 COMPLETE CBC W/AUTO DIFF WBC: CPT | Performed by: OBSTETRICS & GYNECOLOGY

## 2022-07-03 PROCEDURE — 0503F POSTPARTUM CARE VISIT: CPT | Performed by: OBSTETRICS & GYNECOLOGY

## 2022-07-03 PROCEDURE — 25010000002 ENOXAPARIN PER 10 MG: Performed by: OBSTETRICS & GYNECOLOGY

## 2022-07-03 PROCEDURE — 82962 GLUCOSE BLOOD TEST: CPT

## 2022-07-03 RX ORDER — LEVOTHYROXINE SODIUM 0.05 MG/1
50 TABLET ORAL DAILY
Status: DISCONTINUED | OUTPATIENT
Start: 2022-07-04 | End: 2022-07-06 | Stop reason: HOSPADM

## 2022-07-03 RX ORDER — DOCUSATE SODIUM 100 MG/1
100 CAPSULE, LIQUID FILLED ORAL 2 TIMES DAILY PRN
Status: DISCONTINUED | OUTPATIENT
Start: 2022-07-03 | End: 2022-07-06 | Stop reason: HOSPADM

## 2022-07-03 RX ORDER — FERROUS SULFATE 325(65) MG
325 TABLET ORAL
Status: DISCONTINUED | OUTPATIENT
Start: 2022-07-03 | End: 2022-07-06 | Stop reason: HOSPADM

## 2022-07-03 RX ADMIN — Medication 1 TABLET: at 08:21

## 2022-07-03 RX ADMIN — OXYCODONE AND ACETAMINOPHEN 1 TABLET: 5; 325 TABLET ORAL at 04:13

## 2022-07-03 RX ADMIN — LEVOTHYROXINE SODIUM 25 MCG: 0.03 TABLET ORAL at 06:09

## 2022-07-03 RX ADMIN — IBUPROFEN 800 MG: 800 TABLET, FILM COATED ORAL at 00:24

## 2022-07-03 RX ADMIN — SIMETHICONE 80 MG: 80 TABLET, CHEWABLE ORAL at 20:18

## 2022-07-03 RX ADMIN — ENOXAPARIN SODIUM 40 MG: 100 INJECTION SUBCUTANEOUS at 20:18

## 2022-07-03 RX ADMIN — IBUPROFEN 800 MG: 800 TABLET, FILM COATED ORAL at 12:22

## 2022-07-03 RX ADMIN — DOCUSATE SODIUM 100 MG: 100 CAPSULE, LIQUID FILLED ORAL at 20:48

## 2022-07-03 RX ADMIN — OXYCODONE AND ACETAMINOPHEN 1 TABLET: 5; 325 TABLET ORAL at 12:22

## 2022-07-03 RX ADMIN — OXYCODONE AND ACETAMINOPHEN 1 TABLET: 5; 325 TABLET ORAL at 16:34

## 2022-07-03 RX ADMIN — Medication 1 APPLICATION: at 00:56

## 2022-07-03 RX ADMIN — IBUPROFEN 800 MG: 800 TABLET, FILM COATED ORAL at 20:18

## 2022-07-03 RX ADMIN — OXYCODONE AND ACETAMINOPHEN 1 TABLET: 5; 325 TABLET ORAL at 20:48

## 2022-07-03 RX ADMIN — FERROUS SULFATE TAB 325 MG (65 MG ELEMENTAL FE) 325 MG: 325 (65 FE) TAB at 16:35

## 2022-07-03 RX ADMIN — ENOXAPARIN SODIUM 40 MG: 100 INJECTION SUBCUTANEOUS at 08:20

## 2022-07-03 NOTE — PLAN OF CARE
Goal Outcome Evaluation:  Plan of Care Reviewed With: patient        Progress: improving  Outcome Evaluation: Stable, pt feeling much better today and able to ambulate in room and russell independently, voiding on her own without any problems, eating well and taking in po fluids without any difficulty,pt is pumping every 3 hours and has seen lactation

## 2022-07-03 NOTE — PROGRESS NOTES
Section Progress Note    Assessment & Plan     Status post  section: Doing well postoperatively.     1) postpartum care immediately after delivery : Doing well, routine care.  Reports both babies doing well.   2) Maternal morbid obesity - On SCD/Lovenox for VTE PPx  3) Gest DM, diet controlled - Fasting this morning 116 (?) - consider Follow up PCP 6-12 weeks   4) Anemia, postpartum - Hg from 11.7 grams per delivery to 8.5 grams. So will add oral iron   5) Hypertension, chronic - no meds - sent for elevation pre delivery from office. Then had labor - postpartum has had normal BP - will continue to monitor.   6) Hypothyroid - increase dose due to low T4.     Rh status: O positive   Rubella: immune  Gender: Male/Male - NICU for    COVID ND    Subjective     Postpartum Day 1:  Delivery    The patient feels well. The patient denies emotional concerns. Pain is well controlled with current medications. The babies are well.Urinary output is adequate. The patient is ambulating well. The patient is tolerating a normal diet. Patient reports flatus.    Objective     Vital signs in last 24 hours:  Temp:  [97.9 °F (36.6 °C)-98.1 °F (36.7 °C)] 98 °F (36.7 °C)  Heart Rate:  [80-92] 80  Resp:  [16-17] 16  BP: (106-117)/(70-79) 117/79      General:    alert, appears stated age and cooperative   Bowel Sounds:  present    Lochia:  appropriate    Uterine Fundus:   firm    Incision: Laureen in place    DVT Evaluation:  No evidence of DVT on exam      Lab Results   Component Value Date    WBC 14.12 (H) 2022    HGB 8.5 (L) 2022    HCT 25.7 (L) 2022    MCV 77.2 (L) 2022     2022         Luis Alberto Asher MD  7/3/2022  12:54 EDT

## 2022-07-03 NOTE — LACTATION NOTE
This note was copied from a baby's chart.  Mom reports 1ml of EBM with pumping. She reports she has not yet pumped today and is inquiring about how to increase her milk supply. Encouraged pumping 8-12 times per day and staying hydrated. Prescription faxed for personal pump.

## 2022-07-04 LAB — GLUCOSE BLDC GLUCOMTR-MCNC: 76 MG/DL (ref 70–130)

## 2022-07-04 PROCEDURE — 25010000002 ENOXAPARIN PER 10 MG: Performed by: OBSTETRICS & GYNECOLOGY

## 2022-07-04 PROCEDURE — 82962 GLUCOSE BLOOD TEST: CPT

## 2022-07-04 RX ORDER — POLYETHYLENE GLYCOL 3350 17 G/17G
17 POWDER, FOR SOLUTION ORAL DAILY
Status: DISCONTINUED | OUTPATIENT
Start: 2022-07-04 | End: 2022-07-06 | Stop reason: HOSPADM

## 2022-07-04 RX ORDER — AMOXICILLIN 250 MG
1 CAPSULE ORAL NIGHTLY PRN
Status: DISCONTINUED | OUTPATIENT
Start: 2022-07-04 | End: 2022-07-06 | Stop reason: HOSPADM

## 2022-07-04 RX ADMIN — OXYCODONE AND ACETAMINOPHEN 1 TABLET: 5; 325 TABLET ORAL at 21:07

## 2022-07-04 RX ADMIN — Medication 1 TABLET: at 10:23

## 2022-07-04 RX ADMIN — SIMETHICONE 80 MG: 80 TABLET, CHEWABLE ORAL at 21:07

## 2022-07-04 RX ADMIN — SIMETHICONE 80 MG: 80 TABLET, CHEWABLE ORAL at 10:23

## 2022-07-04 RX ADMIN — OXYCODONE AND ACETAMINOPHEN 1 TABLET: 5; 325 TABLET ORAL at 10:22

## 2022-07-04 RX ADMIN — IBUPROFEN 800 MG: 800 TABLET, FILM COATED ORAL at 03:58

## 2022-07-04 RX ADMIN — OXYCODONE AND ACETAMINOPHEN 1 TABLET: 5; 325 TABLET ORAL at 15:46

## 2022-07-04 RX ADMIN — DOCUSATE SODIUM 100 MG: 100 CAPSULE, LIQUID FILLED ORAL at 21:07

## 2022-07-04 RX ADMIN — IBUPROFEN 800 MG: 800 TABLET, FILM COATED ORAL at 13:03

## 2022-07-04 RX ADMIN — LEVOTHYROXINE SODIUM 50 MCG: 0.05 TABLET ORAL at 06:55

## 2022-07-04 RX ADMIN — FERROUS SULFATE TAB 325 MG (65 MG ELEMENTAL FE) 325 MG: 325 (65 FE) TAB at 10:22

## 2022-07-04 RX ADMIN — IBUPROFEN 800 MG: 800 TABLET, FILM COATED ORAL at 21:07

## 2022-07-04 RX ADMIN — DOCUSATE SODIUM 100 MG: 100 CAPSULE, LIQUID FILLED ORAL at 10:22

## 2022-07-04 RX ADMIN — ENOXAPARIN SODIUM 40 MG: 100 INJECTION SUBCUTANEOUS at 21:07

## 2022-07-04 RX ADMIN — ENOXAPARIN SODIUM 40 MG: 100 INJECTION SUBCUTANEOUS at 10:23

## 2022-07-04 RX ADMIN — OXYCODONE AND ACETAMINOPHEN 1 TABLET: 5; 325 TABLET ORAL at 03:58

## 2022-07-04 NOTE — PLAN OF CARE
Problem: Adult Inpatient Plan of Care  Goal: Plan of Care Review  Outcome: Ongoing, Progressing  Flowsheets (Taken 7/4/2022 1554)  Progress: improving  Plan of Care Reviewed With:   patient   significant other  Outcome Evaluation: pt vss, pain well controlled with motrin and percocet. pt had bm today, states abdominal pain/pressure is much better. frequent visits to NICU. pumping q3 hours.  Goal: Patient-Specific Goal (Individualized)  Outcome: Ongoing, Progressing  Goal: Absence of Hospital-Acquired Illness or Injury  Outcome: Ongoing, Progressing  Intervention: Identify and Manage Fall Risk  Recent Flowsheet Documentation  Taken 7/4/2022 1546 by Caitlin Onofre RN  Safety Promotion/Fall Prevention: safety round/check completed  Taken 7/4/2022 1445 by Caitlin Onofre RN  Safety Promotion/Fall Prevention: safety round/check completed  Taken 7/4/2022 1303 by Caitlin Onofre RN  Safety Promotion/Fall Prevention: safety round/check completed  Taken 7/4/2022 1022 by Caitlin Onofre RN  Safety Promotion/Fall Prevention: safety round/check completed  Intervention: Prevent and Manage VTE (Venous Thromboembolism) Risk  Recent Flowsheet Documentation  Taken 7/4/2022 1022 by Caitlin Onofre RN  Activity Management:   activity adjusted per tolerance   activity encouraged   ambulated to bathroom  Goal: Optimal Comfort and Wellbeing  Outcome: Ongoing, Progressing  Intervention: Monitor Pain and Promote Comfort  Recent Flowsheet Documentation  Taken 7/4/2022 1546 by Caitlin Onofre RN  Pain Management Interventions:   see MAR   quiet environment facilitated   pain management plan reviewed with patient/caregiver  Taken 7/4/2022 1303 by Caitlin Onofre RN  Pain Management Interventions:   see MAR   quiet environment facilitated   pain management plan reviewed with patient/caregiver  Taken 7/4/2022 1022 by Caitlin Onofre RN  Pain Management Interventions: (ambulation encouraged)   see MAR   quiet  environment facilitated  Intervention: Provide Person-Centered Care  Recent Flowsheet Documentation  Taken 2022 1022 by Caitlin Onofre RN  Trust Relationship/Rapport:   care explained   choices provided   emotional support provided   empathic listening provided   questions answered   questions encouraged   reassurance provided   thoughts/feelings acknowledged  Goal: Readiness for Transition of Care  Outcome: Ongoing, Progressing     Problem: Hypertensive Disorders in Pregnancy  Goal: Maternal-Fetal Stabilization  Outcome: Ongoing, Progressing     Problem: Diabetes in Pregnancy  Goal: Blood Glucose Level Within Targeted Range  Outcome: Ongoing, Progressing     Problem: Skin Injury Risk Increased  Goal: Skin Health and Integrity  Outcome: Ongoing, Progressing     Problem: Fall Injury Risk  Goal: Absence of Fall and Fall-Related Injury  Outcome: Ongoing, Progressing  Intervention: Promote Injury-Free Environment  Recent Flowsheet Documentation  Taken 2022 1546 by Caitlin Onofre RN  Safety Promotion/Fall Prevention: safety round/check completed  Taken 2022 1445 by Caitlin Onofre RN  Safety Promotion/Fall Prevention: safety round/check completed  Taken 2022 1303 by Caitlin Onofre RN  Safety Promotion/Fall Prevention: safety round/check completed  Taken 2022 1022 by Caitlin Onofre RN  Safety Promotion/Fall Prevention: safety round/check completed     Problem: Adjustment to Role Transition (Postpartum  Delivery)  Goal: Successful Maternal Role Transition  Outcome: Ongoing, Progressing  Intervention: Support Maternal Role Transition  Recent Flowsheet Documentation  Taken 2022 1022 by Caitlin Onofre RN  Supportive Measures: active listening utilized     Problem: Bleeding (Postpartum  Delivery)  Goal: Hemostasis  Outcome: Ongoing, Progressing     Problem: Infection (Postpartum  Delivery)  Goal: Absence of Infection Signs and Symptoms  Outcome: Ongoing,  Progressing     Problem: Pain (Postpartum  Delivery)  Goal: Acceptable Pain Control  Outcome: Ongoing, Progressing  Intervention: Prevent or Manage Pain  Recent Flowsheet Documentation  Taken 2022 1546 by Caitlin Onofre RN  Pain Management Interventions:   see MAR   quiet environment facilitated   pain management plan reviewed with patient/caregiver  Taken 2022 1303 by Caitlin Onofre RN  Pain Management Interventions:   see MAR   quiet environment facilitated   pain management plan reviewed with patient/caregiver  Taken 2022 1022 by Caitlin Onofre RN  Pain Management Interventions: (ambulation encouraged)   see MAR   quiet environment facilitated     Problem: Postoperative Nausea and Vomiting (Postpartum  Delivery)  Goal: Nausea and Vomiting Relief  Outcome: Ongoing, Progressing     Problem: Postoperative Urinary Retention (Postpartum  Delivery)  Goal: Effective Urinary Elimination  Outcome: Ongoing, Progressing   Goal Outcome Evaluation:  Plan of Care Reviewed With: patient, significant other        Progress: improving  Outcome Evaluation: pt vss, pain well controlled with motrin and percocet. pt had bm today, states abdominal pain/pressure is much better. frequent visits to NICU. pumping q3 hours.

## 2022-07-04 NOTE — LACTATION NOTE
This note was copied from a baby's chart.  Patient was concerned that she got very little milk at her last pump and that was from the left breast with none from the right breast. She states that she very much wants to pump and feed EBM to her twin boys. LC reviewed colostrum amount variations and when to expect milk to come in. Enc to call LC as needed.

## 2022-07-04 NOTE — PROGRESS NOTES
Section Progress Note    Assessment & Plan     Status post  section: Doing well postoperatively.     Continue current care.    GDM: Blood sugars reviewed, all < 200; will discontinue monitoring and she may follow up with her PCP postpartum    Hypothyroidism: Synthroid 50mcg    CHTN: no medication, BP within normal limits     Postpartum anemia: asymptomatic.     Anxiety: patient reports a h/o anxiety and ADHD. She was on hydroxyzine and adderall prior to pregnancy. Saw a mental heal provider prior to moving to [place]. She is planning to breastfeed.  Reports prozac made her aggressive in her teen years.  Tried Effexor but that made her feel numb.  Allergic to buspar.  Offered trial of zoloft, discussed risks/benefits of hydroxyzine. She would like to hold for now and will consider if she desires a medication before discharge    Rh status: O positive  Rubella: Immune  Gender: Male/Male in NICU    Subjective     Postpartum Day 2:  Delivery    The patient feels well, but has a headache that she attributes to stress. The patient denies emotional concerns. Pain is well controlled with current medications. The baby iswell. The patient is ambulating well. The patient is tolerating a normal diet. Patient reports flatus.    Objective     Vital signs in last 24 hours:  Temp:  [97.7 °F (36.5 °C)-98.1 °F (36.7 °C)] 98.1 °F (36.7 °C)  Heart Rate:  [65-93] 92  Resp:  [16-19] 16  BP: (117-133)/(77-89) 117/78      General:    alert, appears stated age and cooperative   CV: Well perfused   Lungs:  no respiratory distress   Bowel Sounds:  active   Lochia:  appropriate   Uterine Fundus:   firm   Incision:  clean SANTOS in place   DVT Evaluation:  No evidence of DVT seen on physical exam.     Lab Results   Component Value Date    WBC 14.12 (H) 2022    HGB 8.5 (L) 2022    HCT 25.7 (L) 2022    MCV 77.2 (L) 2022     2022         Augustina Squires MD  2022  11:20 EDT

## 2022-07-04 NOTE — LACTATION NOTE
Patient is eating breakfast and has 34 week twins in NICU. She states she did not breastfeed with her first child who is now 4. She has the HGP at bedside but in not pumping at night due to exhaustion and drowsiness from pain meds. LC encouraged her to pump at night due to importance of hormone levels keeping milk supply up. Maternal hydration encouraged . Patient states milk is getting more in volume and color is lighter. Enc her to call LC as needed and with any questions or concerns.

## 2022-07-04 NOTE — PLAN OF CARE
Problem: Adult Inpatient Plan of Care  Goal: Plan of Care Review  Outcome: Ongoing, Progressing  Flowsheets  Taken 7/3/2022 2156 by Yanira John RN  Outcome Evaluation: Pt is co some pain and anxiety, pt also states she is passing gas but bowel sounds are faint. Pt medicated and MD ordered stool softner pt encouraged to rest at this time and call out for any worsing issues  Taken 7/3/2022 1646 by Edwige Hickman RN  Progress: improving  Plan of Care Reviewed With: patient  Goal: Patient-Specific Goal (Individualized)  Outcome: Ongoing, Progressing  Goal: Absence of Hospital-Acquired Illness or Injury  Outcome: Ongoing, Progressing  Intervention: Identify and Manage Fall Risk  Description: Perform standard risk assessment on admission using a validated tool or comprehensive approach appropriate to the patient; reassess fall risk frequently, with change in status or transfer to another level of care.  Communicate fall injury risk to interprofessional healthcare team.  Determine need for increased observation, equipment and environmental modification, such as low bed, signage and supportive, nonskid footwear.  Adjust safety measures to individual developmental age, stage and identified risk factors.  Reinforce the importance of safety and physical activity with patient and family.  Perform regular intentional rounding to assess need for position change, pain assessment and personal needs, including assistance with toileting.  Recent Flowsheet Documentation  Taken 7/3/2022 2018 by Yanira John, RN  Safety Promotion/Fall Prevention: safety round/check completed  Intervention: Prevent and Manage VTE (Venous Thromboembolism) Risk  Description: Assess for VTE (venous thromboembolism) risk.  Encourage and assist with early ambulation.  Initiate and maintain compression or other therapy, as indicated, based on identified risk in accordance with organizational protocol and provider  order.  Encourage both active and passive leg exercises while in bed, if unable to ambulate.  Recent Flowsheet Documentation  Taken 7/3/2022 2048 by Yanira John RN  Activity Management: (rest encouraged at this time)   activity adjusted per tolerance   activity minimized  Taken 7/3/2022 2018 by Yanira John RN  Activity Management: (pt encouraged to rest and relax at this time to help HA)   activity adjusted per tolerance   up ad thomas   activity minimized   up in chair  Goal: Optimal Comfort and Wellbeing  Outcome: Ongoing, Progressing  Intervention: Monitor Pain and Promote Comfort  Description: Assess pain level, treatment efficacy and patient response at regular intervals using a consistent pain scale.  Consider the presence and impact of preexisting chronic pain.  Encourage patient and caregiver involvement in pain assessment, interventions and safety measures.  Recent Flowsheet Documentation  Taken 7/3/2022 2048 by Yanira John RN  Pain Management Interventions:   see MAR   relaxation techniques promoted  Taken 7/3/2022 2018 by Yanira John RN  Pain Management Interventions:   see MAR   relaxation techniques promoted  Intervention: Provide Person-Centered Care  Description: Use a family-focused approach to care.  Develop trust and rapport by proactively providing information, encouraging questions, addressing concerns and offering reassurance.  Acknowledge emotional response to hospitalization.  Recognize and utilize personal coping strategies.  Honor spiritual and cultural preferences.  Recent Flowsheet Documentation  Taken 7/3/2022 2048 by Yanira John RN  Trust Relationship/Rapport:   care explained   choices provided   emotional support provided   thoughts/feelings acknowledged   reassurance provided   questions encouraged   questions answered   empathic listening provided  Taken 7/3/2022 2018 by Yanira John  Vel RN  Trust Relationship/Rapport:   care explained   choices provided   emotional support provided   empathic listening provided   questions encouraged   questions answered   thoughts/feelings acknowledged   reassurance provided  Goal: Readiness for Transition of Care  Outcome: Ongoing, Progressing   Goal Outcome Evaluation:              Outcome Evaluation: Pt is co some pain and anxiety, pt also states she is passing gas but bowel sounds are faint. Pt medicated and MD ordered stool softner pt encouraged to rest at this time and call out for any worsing issues

## 2022-07-05 PROCEDURE — 0503F POSTPARTUM CARE VISIT: CPT | Performed by: OBSTETRICS & GYNECOLOGY

## 2022-07-05 PROCEDURE — 25010000002 ENOXAPARIN PER 10 MG: Performed by: OBSTETRICS & GYNECOLOGY

## 2022-07-05 RX ADMIN — ENOXAPARIN SODIUM 40 MG: 100 INJECTION SUBCUTANEOUS at 20:28

## 2022-07-05 RX ADMIN — OXYCODONE AND ACETAMINOPHEN 1 TABLET: 5; 325 TABLET ORAL at 03:54

## 2022-07-05 RX ADMIN — FERROUS SULFATE TAB 325 MG (65 MG ELEMENTAL FE) 325 MG: 325 (65 FE) TAB at 08:13

## 2022-07-05 RX ADMIN — IBUPROFEN 800 MG: 800 TABLET, FILM COATED ORAL at 06:40

## 2022-07-05 RX ADMIN — OXYCODONE AND ACETAMINOPHEN 1 TABLET: 5; 325 TABLET ORAL at 15:42

## 2022-07-05 RX ADMIN — LEVOTHYROXINE SODIUM 50 MCG: 0.05 TABLET ORAL at 06:40

## 2022-07-05 RX ADMIN — POLYETHYLENE GLYCOL 3350 17 G: 17 POWDER, FOR SOLUTION ORAL at 15:48

## 2022-07-05 RX ADMIN — IBUPROFEN 800 MG: 800 TABLET, FILM COATED ORAL at 15:42

## 2022-07-05 RX ADMIN — ENOXAPARIN SODIUM 40 MG: 100 INJECTION SUBCUTANEOUS at 08:13

## 2022-07-05 RX ADMIN — DOCUSATE SODIUM 100 MG: 100 CAPSULE, LIQUID FILLED ORAL at 15:42

## 2022-07-05 RX ADMIN — Medication 1 TABLET: at 08:13

## 2022-07-05 RX ADMIN — OXYCODONE AND ACETAMINOPHEN 1 TABLET: 5; 325 TABLET ORAL at 20:28

## 2022-07-05 NOTE — PAYOR COMM NOTE
"Beth Pascual (22 y.o. Female)     [facility]  [address] [place] [address]  Facility NPI: [id number]    [facility]  Phone: [phone]  Fax: [phone]  Subject MATERNITY ADMISSION AUTH REQUEST CLINICALS  Reference #: [id number]  Please don't hesitate to contact me with any questions or concerns.                Date of Birth   2000    Social Security Number       Address   [address] [facility] [address]    Home Phone   [phone]    MRN   [id number]       Yarsanism   Other    Marital Status   Single                            Admission Date   22    Admission Type   Elective    Admitting Provider   Luis Alberto Asher MD    Attending Provider   Luis Alberto Asher MD    Department, Room/Bed   [facility], S320/1       Discharge Date       Discharge Disposition       Discharge Destination                               Attending Provider: Luis Alberto Asher MD    Allergies: Buspirone, Lamotrigine, Norelgestromin-eth Estradiol, Oxcarbazepine, Amphetamine-dextroamphetamine, Dicyclomine, Metoclopramide    Isolation: None   Infection: None   Code Status: CPR   Advance Care Planning Activity    Ht: 162.6 cm (64\")   Wt: 123 kg (271 lb 12.8 oz)    Admission Cmt: None   Principal Problem: S/P  section [Z98.891]                 Active Insurance as of 2022     Primary Coverage     Payor Plan Insurance Group Employer/Plan Group    [employer] [place] [employer] [place]ICA[place]      Payor Plan Address Payor Plan Phone Number Payor Plan Fax Number Effective Dates    [address] [address] [phone]  10/31/2017 - None Entered    [facility] [address]       Subscriber Name Subscriber Birth Date Member ID       BETH PASCUAL 2000 [id number]                 Emergency Contacts      (Rel.) Home Phone Work Phone Mobile Phone    SHLOMO BUI (Significant Other) [phone] -- --    Marga Bui (Mother) [phone] -- --             "   History & Physical      Luis Alberto Asher MD at 22 1738          [facility]   HISTORY AND PHYSICAL    Patient Name: Beth Gaviria  : 2000  MRN: [id number]  Primary Care Physician:  Sneha Cota APRN  Date of admission: 2022    Subjective   Subjective     Chief Complaint: Sent from office for significant hypertension in pregnancy     History of Present Illness     22 y.o.  @ 34w3d with Di/Di twin gestation.   Presented today for prenatal visit and noted to have elevated Blood pressure of 147/101 and 1+ proteinuria on dip. Complaining of headache for last 3 days as well.     Pregnancy complicated by   - Di/Di twin gestation. - MFM following for number of reasons, growth and BPP last week showed A - 6# - 79% overall, A/C 94% - Breech, anterior placenta and MVP of 9.0 cm, (polyhydramnios), B - 6#10oz, 90% overall, A/C > 99%, Breech, anterior placenta and MVP of 11.7 cm (polyhydramnios)   - Hypertension - has had prior mild elevations in BP all along - so suspect chronic in nature, no medication. Today with significant up tick in her BP and with headache and proteinuria along with multiple other risk factors - high risk for superimposed pre-eclampsia   - Gest DM, diet controlled. Log not available and reports fair control with fasting < 90 and 2 hour post meals < 140. But given both macrosomia and polyhydramnios this has always been in question.   - Maternal morbid obesity - Pre pregnancy BMI > 40 so at risk   - hypothyroid (check labs with admission)   - Prior  - plan repeat with delivery.     Review of Systems   Constitutional: Negative for fever.   Eyes: Negative for visual disturbance.   Gastrointestinal: Negative for abdominal pain.   Genitourinary: Negative for dysuria, pelvic pain and vaginal bleeding.   Neurological: Positive for headaches.   All other systems reviewed and are negative.       Personal History     Past Medical History:   Diagnosis Date   • ADHD  (attention deficit hyperactivity disorder)    • Anxiety    • Chronic hypertension     not issues currently, previously on labatelol   • Depression    • Dichorionic diamniotic twin pregnancy in second trimester 2022   • Gestational diabetes 2018   • Hypothyroid in pregnancy, antepartum, third trimester 2022   • Insomnia    • PONV (postoperative nausea and vomiting)    • Preeclampsia 2018   • Varicella      OB History    Para Term  AB Living   2 1 1     1   SAB IAB Ectopic Molar Multiple Live Births             1      # Outcome Date GA Lbr Prasanna/2nd Weight Sex Delivery Anes PTL Lv   2 Current            1 Term 18 38w4d  4475 g (9 lb 13.9 oz) M CS-Classical Spinal N DINA      Complications: Decreased fetal movement, LGA (large for gestational age) fetus affecting management of mother, GDM (gestational diabetes mellitus), Preeclampsia, Hypertension     Past Surgical History:   Procedure Laterality Date   •  SECTION     • CHOLECYSTECTOMY     • LAPAROSCOPIC CHOLECYSTECTOMY  2018   • TONSILLECTOMY     • WISDOM TOOTH EXTRACTION         Family History: family history includes Breast cancer in her maternal grandmother; Cancer in her father and maternal grandmother; Coronary artery disease in her mother; Deep vein thrombosis in her maternal uncle; Diabetes in her father and mother; Heart attack in her father and paternal grandmother; Heart disease in her mother; Hypertension in her father and mother; Stroke in her paternal grandmother. Otherwise pertinent FHx was reviewed and not pertinent to current issue.    Social History:  reports that she quit smoking about 4 years ago. Her smoking use included cigarettes, electronic cigarette, cigarettes, and electronic cigarette. She started smoking about 5 years ago. She has a 0.50 pack-year smoking history. She has never used smokeless tobacco. She reports previous alcohol use. She reports previous drug use.    Home Medications:  Blood Glucose  Test, FreeStyle Lite, Lancets, Prenatal, aspirin, glyburide, levothyroxine, and ondansetron    Allergies:  Allergies   Allergen Reactions   • Buspirone Swelling and Other (See Comments)     SERVE SWELLING IN THE FACE PER PT  Heart palpitations   • Lamotrigine Rash and Hives   • Norelgestromin-Eth Estradiol Swelling   • Oxcarbazepine Rash   • Amphetamine-Dextroamphetamine Palpitations   • Dicyclomine Rash   • Metoclopramide Irritability and Other (See Comments)     Causes agitation and aggression       Objective    Objective     Vitals:   BP: (147)/(101) 147/101    Physical Exam  Vitals reviewed.   Constitutional:       General: She is not in acute distress.     Appearance: She is well-developed. She is obese.   HENT:      Head: Normocephalic and atraumatic.   Eyes:      General:         Right eye: No discharge.         Left eye: No discharge.      Conjunctiva/sclera: Conjunctivae normal.   Neck:      Thyroid: No thyromegaly.   Cardiovascular:      Rate and Rhythm: Normal rate and regular rhythm.      Heart sounds: Normal heart sounds. No murmur heard.  Pulmonary:      Effort: Pulmonary effort is normal. No respiratory distress.      Breath sounds: Normal breath sounds.   Abdominal:      General: There is distension (fundal height > 36 cm ).      Palpations: Abdomen is soft.      Tenderness: There is no abdominal tenderness.   Musculoskeletal:         General: Normal range of motion.      Cervical back: Normal range of motion and neck supple.      Right lower leg: Edema present.      Left lower leg: Edema (1+ edema ) present.   Lymphadenopathy:      Cervical: No cervical adenopathy.   Skin:     General: Skin is warm and dry.      Findings: No rash.   Neurological:      Mental Status: She is alert and oriented to person, place, and time.      Deep Tendon Reflexes: Reflexes normal.   Psychiatric:         Behavior: Behavior normal.         Thought Content: Thought content normal.         Judgment: Judgment normal.          Result Review    Result Review:        Assessment & Plan   Assessment / Plan     Brief Patient Summary:  Beth Gaviria is a 22 y.o. female  @ 34w3d with Di/Di twin gestation   1) Di/Di twins - See HPI - Showing both macrosomia and polyhydramnios and today reports dec FM for A. Per MFM suggest we see weekly for BPP - but showed to office just today (last BPP last week) - so will get BPP during admission, start with NST today   2) Hypertension - either with exacerbation or possibly super-imposed pre-eclampsia.  Given issues starting BMZ in case need to deliver earlier and have  consult. Work of with PIH labs, P/C and 24 hour urine for final confirmation while get serial BP along the way. Per typical - delivery would be based on severe range BP, elevated creatinine, fetal distress or other features consistent with severe pre-eclampsia.   3) Gest DM, diet controlled. Have to consider not well controlled given macrosomia and Polyhydramnios. Checking HgA1c and will continue to check serial BS fasting and 2 hours after meals to confirm her control   4) Hypothyroid in pregnancy, recent medication adjustment - so repeat TSH, T4   5) Maternal morbid obesity - plan SCD for DVT PPx. Interval gain likely edema (7# in about one week).   6) Prior , both breech last check. So delivery by repeat likely     Active Hospital Problems:  Active Hospital Problems    Diagnosis    • **Chronic hypertension with exacerbation during pregnancy in third trimester    • Dichorionic diamniotic twin pregnancy in third trimester    • Diet controlled gestational diabetes mellitus (GDM) in third trimester    • Macrosomia of fetus affecting management of mother in third trimester    • Polyhydramnios in third trimester    • Maternal morbid obesity in third trimester, antepartum (HCC)    • Previous  delivery, antepartum    • Hypothyroid in pregnancy, antepartum, third trimester    • Decreased fetal movements in third  trimester      Plan:   PIH labs (CBC, CMP, P/C urine and 24 hour urine)   BMZ for FLM    consult in case need to deliver early   Check BS QID   HgA1c  TSH, T4 to reassess thyroid   BPP due soon   BID NST   Deliver for severe PIH features   Serial BP   SCD for VTE PPx     DVT prophylaxis:  Mechanical DVT prophylaxis orders are present.    CODE STATUS:       Admission Status:  I believe this patient meets inpatient status.    Luis Alberto Asher MD  2022   17:56 EDT     Electronically signed by Luis Alberto Asher MD at 22 1756         Facility-Administered Medications as of 2022   Medication Dose Route Frequency Provider Last Rate Last Admin   • [COMPLETED] acetaminophen (TYLENOL) tablet 1,000 mg  1,000 mg Oral Once Luis Alberto Asher MD   1,000 mg at 22 2359   • all purpose nipple ointment   Topical 4x Daily PRN Luis Alberto Asher MD       • [COMPLETED] AZITHROMYCIN 500 MG/250 ML 0.9% NS IVPB (vial-mate)  500 mg Intravenous Once Luis Alberto Asher MD   500 mg at 22 0052   • [COMPLETED] ceFAZolin in Sodium Chloride (ANCEF) IVPB solution 3 g  3 g Intravenous Once Luis Alberto Asher  mL/hr at 22 0007 3 g at 22 0007   • docusate sodium (COLACE) capsule 100 mg  100 mg Oral BID PRN Luis Alberto Asher MD   100 mg at 22 1542   • Enoxaparin Sodium (LOVENOX) syringe 40 mg  40 mg Subcutaneous Q12H Luis Alberto Asher MD   40 mg at 22 0813   • [] erythromycin (ROMYCIN) 5 MG/GM ophthalmic ointment  - ADS Override Pull            • [] erythromycin (ROMYCIN) 5 MG/GM ophthalmic ointment  - ADS Override Pull            • [COMPLETED] famotidine (PEPCID) injection 20 mg  20 mg Intravenous Once PRN Luis Alberto Asher MD   20 mg at 22 0000   • ferrous sulfate tablet 325 mg  325 mg Oral Daily With Breakfast Luis Alberto Asher MD   325 mg at 22 0813   • Hydrocortisone (Perianal) (ANUSOL-HC) 2.5 % rectal cream   Rectal TID  PRN Luis Alberto Asher MD       • hydrOXYzine (ATARAX) tablet 50 mg  50 mg Oral Q6H PRN Luis Alberto Asher MD   50 mg at 22 1236   • ibuprofen (ADVIL,MOTRIN) tablet 800 mg  800 mg Oral Q8H PRN Luis Alberto Asher MD   800 mg at 22 1542   • [COMPLETED] lactated ringers bolus 1,000 mL  1,000 mL Intravenous Once Luis Alberto Asher MD 1,000 mL/hr at 22 215 1,000 mL at 22 215   • lanolin topical 1 application  1 application Topical PRN Luis Alberto Asher MD   1 application at 22 0056   • levothyroxine (SYNTHROID, LEVOTHROID) tablet 50 mcg  50 mcg Oral Daily Luis Alberto Asher MD   50 mcg at 22 0640   • naloxone (NARCAN) injection 0.2 mg  0.2 mg Intravenous Q15 Min PRN Patricia Funk CRNA       • [COMPLETED] NIFEdipine (PROCARDIA) capsule 10 mg  10 mg Oral Once Luis Alberto Asher MD   10 mg at 22 2218   • oxyCODONE-acetaminophen (PERCOCET) 5-325 MG per tablet 1 tablet  1 tablet Oral Q4H PRN Luis Alberto Asher MD   1 tablet at 22 1542   • [COMPLETED] oxytocin in sodium chloride (PITOCIN) 30 UNIT/500ML infusion solution  999 mL/hr Intravenous Once Luis Alberto Asher MD   Stopped at 22 0204    Followed by   • [] oxytocin in sodium chloride (PITOCIN) 30 UNIT/500ML infusion solution  250 mL/hr Intravenous Continuous PRN Lui sAlberto Asher  mL/hr at 22 0205 125 mL/hr at 22 0205   • oxytocin in sodium chloride (PITOCIN) 30 UNIT/500ML infusion solution  125 mL/hr Intravenous Continuous PRN Luis Alberto Asher MD       • [] phytonadione (VITAMIN K) 1 MG/0.5ML injection  - ADS Override Pull            • [] phytonadione (VITAMIN K) 1 MG/0.5ML injection  - ADS Override Pull            • polyethylene glycol (MIRALAX) packet 17 g  17 g Oral Daily Augustina Squires MD   17 g at 22 1548   • prenatal vitamin tablet 1 tablet  1 tablet Oral Daily Luis Alberto Asher MD   1 tablet at 22 0813   •  promethazine (PHENERGAN) tablet 25 mg  25 mg Oral Q6H PRN Luis Alberto Asher MD        Or   • promethazine (PHENERGAN) suppository 12.5 mg  12.5 mg Rectal Q6H PRN Luis Alberto Asher MD       • sennosides-docusate (PERICOLACE) 8.6-50 MG per tablet 1 tablet  1 tablet Oral Nightly PRN Augustina Squires MD       • simethicone (MYLICON) chewable tablet 80 mg  80 mg Oral 4x Daily PRN Luis Alberto Asher MD   80 mg at 07/04/22 2107     Lab Results (last 72 hours)     Procedure Component Value Units Date/Time    POC Glucose Once [[id number]]  (Normal) Collected: 07/04/22 0740    Specimen: Blood Updated: 07/04/22 0741     Glucose 76 mg/dL      Comment: Meter: [id number] : [id number] Brenna REID       CBC & Differential [[id number]]  (Abnormal) Collected: 07/03/22 0959    Specimen: Blood Updated: 07/03/22 1033    Narrative:      The following orders were created for panel order CBC & Differential.  Procedure                               Abnormality         Status                     ---------                               -----------         ------                     CBC Auto Differential[[id number]]        Abnormal            Final result                 Please view results for these tests on the individual orders.    CBC Auto Differential [[id number]]  (Abnormal) Collected: 07/03/22 0959    Specimen: Blood Updated: 07/03/22 1033     WBC 14.12 10*3/mm3      RBC 3.33 10*6/mm3      Hemoglobin 8.5 g/dL      Hematocrit 25.7 %      MCV 77.2 fL      MCH 25.5 pg      MCHC 33.1 g/dL      RDW 15.5 %      RDW-SD 42.3 fl      MPV 11.3 fL      Platelets 258 10*3/mm3      Neutrophil % 69.3 %      Lymphocyte % 19.3 %      Monocyte % 9.1 %      Eosinophil % 0.2 %      Basophil % 0.2 %      Immature Grans % 1.9 %      Neutrophils, Absolute 9.78 10*3/mm3      Lymphocytes, Absolute 2.72 10*3/mm3      Monocytes, Absolute 1.29 10*3/mm3      Eosinophils, Absolute 0.03 10*3/mm3      Basophils, Absolute 0.03 10*3/mm3       Immature Grans, Absolute 0.27 10*3/mm3      nRBC 0.1 /100 WBC     POC Glucose Once [[id number]]  (Normal) Collected: 07/03/22 0741    Specimen: Blood Updated: 07/03/22 0753     Glucose 116 mg/dL      Comment: Meter: [id number] : [id number] Carola REID       POC Glucose Once [[id number]]  (Abnormal) Collected: 07/02/22 1917    Specimen: Blood Updated: 07/02/22 1918     Glucose 157 mg/dL      Comment: Meter: [id number] : [id number] Venu REID             Imaging Results (Last 72 Hours)     ** No results found for the last 72 hours. **        ECG/EMG Results (last 72 hours)     ** No results found for the last 72 hours. **        Orders (last 72 hrs)      Start     Ordered    07/05/22 1654  Notify Physician  Until Discontinued         07/05/22 1654    07/04/22 1145  polyethylene glycol (MIRALAX) packet 17 g  Daily         07/04/22 1047    07/04/22 1047  sennosides-docusate (PERICOLACE) 8.6-50 MG per tablet 1 tablet  Nightly PRN         07/04/22 1047    07/04/22 0742  POC Glucose Once  PROCEDURE ONCE         07/04/22 0740    07/04/22 0600  levothyroxine (SYNTHROID, LEVOTHROID) tablet 50 mcg  Daily         07/03/22 1301    07/03/22 2028  docusate sodium (COLACE) capsule 100 mg  2 Times Daily PRN         07/03/22 2028    07/03/22 1430  ferrous sulfate tablet 325 mg  Daily With Breakfast         07/03/22 1301    07/03/22 0900  Enoxaparin Sodium (LOVENOX) syringe 40 mg  Every 12 Hours Scheduled         07/02/22 0532    07/03/22 0754  POC Glucose Once  PROCEDURE ONCE         07/03/22 0741    07/03/22 0630  POC Glucose Once  Once,   Status:  Canceled         07/02/22 0532    07/03/22 0600  Discontinue Indwelling Urinary Catheter in AM  Once         07/02/22 0532    07/03/22 0600  CBC & Differential  Morning Draw         07/02/22 0532    07/03/22 0600  POC Glucose 4x Daily Fasting & PC  4 Times Daily Fasting & After Meals,   Status:  Canceled       07/02/22 1909    07/03/22 0600  CBC Auto Differential   "PROCEDURE ONCE         07/02/22 2203 07/03/22 0051  lanolin topical 1 application  As Needed         07/03/22 0052    07/03/22 0000  Discontinue IV  Once         07/02/22 0532    07/03/22 0000  Remove Abdominal Dressing  Once         07/02/22 0532    07/02/22 1919  POC Glucose Once  PROCEDURE ONCE         07/02/22 1917    07/02/22 0900  prenatal vitamin tablet 1 tablet  Daily         07/02/22 0532    07/02/22 0800  Ambulate Patient  2 Times Daily      Comments: After anesthesia wears off.    07/02/22 0532    07/02/22 0630  lactated ringers infusion  Continuous,   Status:  Discontinued         07/02/22 0532    07/02/22 0600  levothyroxine (SYNTHROID, LEVOTHROID) tablet 25 mcg  Daily,   Status:  Discontinued         07/02/22 0532    07/02/22 0600  Incentive spirometry RT  Every Hour       07/02/22 0532    07/02/22 0532  I/O  Every Shift       07/02/22 0532    07/02/22 0531  simethicone (MYLICON) chewable tablet 80 mg  4 Times Daily PRN         07/02/22 0532    07/02/22 0531  promethazine (PHENERGAN) tablet 25 mg  Every 6 Hours PRN        \"Or\" Linked Group Details    07/02/22 0532    07/02/22 0531  promethazine (PHENERGAN) suppository 12.5 mg  Every 6 Hours PRN        \"Or\" Linked Group Details    07/02/22 0532    07/02/22 0531  all purpose nipple ointment  4 Times Daily PRN         07/02/22 0532    07/02/22 0531  hydrOXYzine (ATARAX) tablet 50 mg  Every 6 Hours PRN         07/02/22 0532    07/02/22 0531  Hydrocortisone (Perianal) (ANUSOL-HC) 2.5 % rectal cream  3 Times Daily PRN         07/02/22 0532    07/02/22 0528  naloxone (NARCAN) injection 0.2 mg  Every 15 Minutes PRN         07/02/22 0528    07/02/22 0253  oxytocin in sodium chloride (PITOCIN) 30 UNIT/500ML infusion solution  Continuous PRN         07/02/22 0253    07/02/22 0253  ibuprofen (ADVIL,MOTRIN) tablet 800 mg  Every 8 Hours PRN         07/02/22 0253    07/02/22 0253  oxyCODONE-acetaminophen (PERCOCET) 5-325 MG per tablet 1 tablet  Every 4 Hours PRN   " "      22 0253    22 235  Urinary Catheter Care  Every Shift,   Status:  Canceled      \"And\" Linked Group Details    22 2350    22 2349  famotidine (PEPCID) injection 20 mg  Once As Needed         22 235    Unscheduled  Blood Gas, Arterial -  As Needed       22 0208    Unscheduled  Up with Assistance  As Needed       22 0532    Unscheduled  Bladder Scan if Patient Unable to Void 4-6 Hours After Catheter Removal  As Needed         22 0532    Unscheduled  Straight Cath Every 4-6 Hours As Needed If Patient is Unable to Void After 4-6 Hours, Bladder Scan Volume is Greater Than 350-500mL & Patient Has Symptoms of Bladder Discomfort / Distention  As Needed       22 0532    Unscheduled  Schedule / Prompt Voiding For Patients With Urinary Incontinence  As Needed       22 0532    Unscheduled  Abdominal Wound Care  As Needed      Comments: Postop day 1. Remove dressing and leave incision open to air.    22 0532    Unscheduled  KPad  As Needed      Comments: PRN pain    22 0532    Unscheduled  Apply witch hazel pads / TUCKS to perineum as needed for comfort PRN  As Needed       22 0532    Unscheduled  Warm compress  As Needed       22 0532    Unscheduled  Apply ace wrap, tight bra, or binder  As Needed       22 0532    Unscheduled  Apply ice packs  As Needed       22 0532                Operative/Procedure Notes (last 72 hours)  Notes from 22 1909 through 22 190   No notes of this type exist for this encounter.            Physician Progress Notes (last 72 hours)      Rishabh Eaton MD at 22 1013              Assessment/Plan:  Status post  section. Doing well postoperatively.     Continue current care.  Planning discharge tomorrow on postop day 4 since baby is in NICU.  SANTOS drain to be removed Friday in office.   Subjective:  Postpartum Day 3:  Delivery    The patient feels well.  Pain is well " controlled with current medications. The babies are well in NICU. . . Urinary output is adequate. The patient is ambulating well. The patient is tolerating a normal diet. Flatus has been passed.    Objective:  Vital signs in last 24 hours:  Temp:  [97.9 °F (36.6 °C)-98.2 °F (36.8 °C)] 98 °F (36.7 °C)  Heart Rate:  [81-96] 81  Resp:  [16-17] 16  BP: (133-141)/(86-90) 133/90      General:    alert, appears stated age and cooperative   Bowel Sounds:  active + BM   Lochia:  appropriate   Uterine Fundus:   firm   Incision:  healing well, no significant drainage, no dehiscence, no significant erythema   DVT Evaluation:  No evidence of DVT seen on physical exam.           Blood type: O +    Rubella: IMMUNE  HgB 8.5      Electronically signed by Rishabh Eaton MD at 22 1015     Augustina Squires MD at 22 1120           Section Progress Note    Assessment & Plan     Status post  section: Doing well postoperatively.     Continue current care.    GDM: Blood sugars reviewed, all < 200; will discontinue monitoring and she may follow up with her PCP postpartum    Hypothyroidism: Synthroid 50mcg    CHTN: no medication, BP within normal limits     Postpartum anemia: asymptomatic.     Anxiety: patient reports a h/o anxiety and ADHD. She was on hydroxyzine and adderall prior to pregnancy. Saw a mental heal provider prior to moving to [place]. She is planning to breastfeed.  Reports prozac made her aggressive in her teen years.  Tried Effexor but that made her feel numb.  Allergic to buspar.  Offered trial of zoloft, discussed risks/benefits of hydroxyzine. She would like to hold for now and will consider if she desires a medication before discharge    Rh status: O positive  Rubella: Immune  Gender: Male/Male in NICU    Subjective     Postpartum Day 2:  Delivery    The patient feels well, but has a headache that she attributes to stress. The patient denies emotional concerns. Pain is well  controlled with current medications. The baby iswell. The patient is ambulating well. The patient is tolerating a normal diet. Patient reports flatus.    Objective     Vital signs in last 24 hours:  Temp:  [97.7 °F (36.5 °C)-98.1 °F (36.7 °C)] 98.1 °F (36.7 °C)  Heart Rate:  [65-93] 92  Resp:  [16-19] 16  BP: (117-133)/(77-89) 117/78      General:    alert, appears stated age and cooperative   CV: Well perfused   Lungs:  no respiratory distress   Bowel Sounds:  active   Lochia:  appropriate   Uterine Fundus:   firm   Incision:  clean SANTOS in place   DVT Evaluation:  No evidence of DVT seen on physical exam.     Lab Results   Component Value Date    WBC 14.12 (H) 2022    HGB 8.5 (L) 2022    HCT 25.7 (L) 2022    MCV 77.2 (L) 2022     2022         Augustina Squires MD  2022  11:20 EDT        Electronically signed by Augustina Squires MD at 22 1307     Luis Alberto Asher MD at 22 1254           Section Progress Note    Assessment & Plan     Status post  section: Doing well postoperatively.     1) postpartum care immediately after delivery : Doing well, routine care.  Reports both babies doing well.   2) Maternal morbid obesity - On SCD/Lovenox for VTE PPx  3) Gest DM, diet controlled - Fasting this morning 116 (?) - consider Follow up PCP 6-12 weeks   4) Anemia, postpartum - Hg from 11.7 grams per delivery to 8.5 grams. So will add oral iron   5) Hypertension, chronic - no meds - sent for elevation pre delivery from office. Then had labor - postpartum has had normal BP - will continue to monitor.   6) Hypothyroid - increase dose due to low T4.     Rh status: O positive   Rubella: immune  Gender: Male/Male - NICU for    COVID ND    Subjective     Postpartum Day 1:  Delivery    The patient feels well. The patient denies emotional concerns. Pain is well controlled with current medications. The babies are well.Urinary output is adequate. The  patient is ambulating well. The patient is tolerating a normal diet. Patient reports flatus.    Objective     Vital signs in last 24 hours:  Temp:  [97.9 °F (36.6 °C)-98.1 °F (36.7 °C)] 98 °F (36.7 °C)  Heart Rate:  [80-92] 80  Resp:  [16-17] 16  BP: (106-117)/(70-79) 117/79      General:    alert, appears stated age and cooperative   Bowel Sounds:  present    Lochia:  appropriate    Uterine Fundus:   firm    Incision: Laureen in place    DVT Evaluation:  No evidence of DVT on exam      Lab Results   Component Value Date    WBC 14.12 (H) 07/03/2022    HGB 8.5 (L) 07/03/2022    HCT 25.7 (L) 07/03/2022    MCV 77.2 (L) 07/03/2022     07/03/2022         Luis Alberto Asher MD  7/3/2022  12:54 EDT      Electronically signed by Luis Alberto Ahser MD at 07/03/22 1301       Consult Notes (last 72 hours)  Notes from 07/02/22 1909 through 07/05/22 1909   No notes of this type exist for this encounter.

## 2022-07-05 NOTE — PROGRESS NOTES
Assessment/Plan:  Status post  section. Doing well postoperatively.     Continue current care.  Planning discharge tomorrow on postop day 4 since baby is in NICU.  SANTOS drain to be removed Friday in office.   Subjective:  Postpartum Day 3:  Delivery    The patient feels well.  Pain is well controlled with current medications. The babies are well in NICU. . . Urinary output is adequate. The patient is ambulating well. The patient is tolerating a normal diet. Flatus has been passed.    Objective:  Vital signs in last 24 hours:  Temp:  [97.9 °F (36.6 °C)-98.2 °F (36.8 °C)] 98 °F (36.7 °C)  Heart Rate:  [81-96] 81  Resp:  [16-17] 16  BP: (133-141)/(86-90) 133/90      General:    alert, appears stated age and cooperative   Bowel Sounds:  active + BM   Lochia:  appropriate   Uterine Fundus:   firm   Incision:  healing well, no significant drainage, no dehiscence, no significant erythema   DVT Evaluation:  No evidence of DVT seen on physical exam.           Blood type: O +    Rubella: IMMUNE  HgB 8.5

## 2022-07-05 NOTE — LACTATION NOTE
LC met patient coming into NICU. Her milk is coming in well and she was excited to have a full 2 oz bottle. She has a support person with her this morning as they were coming back to see the twins. ECHO explained that the 4 oz bottles will work well as the milk is coming in now and a bag was placed in her PP room . Enc her to call LC as needed and to stay well hydrated.

## 2022-07-05 NOTE — PLAN OF CARE
Goal Outcome Evaluation:           Progress: improving  Outcome Evaluation: VSS, pain controlled with po medication, using HGP and pumping 2 oz at a time, encouraged by increased volume of milk

## 2022-07-05 NOTE — PROGRESS NOTES
Discharge Planning Assessment  [facility]     Patient Name: Beth Gaviria  MRN: [id number]  Today's Date: 7/5/2022    Admit Date: 7/1/2022     Discharge Needs Assessment    No documentation.                Discharge Plan     Row Name 07/05/22 1009       Plan    Plan Infants to discharge home with mother when medically stable. Parul NORIEGA CSW    Plan Comments Mother: Beth Gaviria MRN: [id number]; Infant (A): Omar “Dmitriy” Everette MRN: [id number]; Infant (B): Omar “Mykel” Everette MRN: [id number]; CSW was consulted for “NICU admission.” Mother nor infants had a UDS conducted at admission nor were infants cord toxicology’s sent due to there being no indicators of substance use. CSW met with mother at infants’ bedside to conduct consult. Mother’s mother was also present and she gave permission to CSW for CSW to speak with her while her mother was present. Mother verified her home address, phone number and insurance provider. Mother plans to add infants to her insurance plan and [facility] Mother was enrolled in WIC during pregnancy and plans on re-enrolling once infants are medically stable to discharge home. Mother’s support system consists of infants’ father, maternal grandmother, maternal aunt, father-in-law (soon to be) and other family members. Mother also has a 4-year-old son and he is currently with his/infants’ father. Mother plans on taking infants to see a pediatrician at [facility]: Anya and she is comfortable scheduling infants’ appointments and has transportation to them as well. Mother confirmed that infants each have a car seat, crib/bassinet and other necessary supplies needed for infants (clothing, diapers, bottles, etc.). CSW provided mother with a mother/baby resource guide and briefly went over the packet with her. The packet contained information on: WIC, HANDS, PPD, counseling/support groups, NICU, financial assistance, etc. CSW asked mother if she had any needs or  concerns that CSW could assist her with and mother declined. CSW relayed to mother that throughout her and infants’ stay CSW will remain available so if she needs additional CSW assistance she can inform a member of her or infants’ care team and they will request CSW and mother verbalized understanding. Throughout consult mother was very pleasant, polite, appropriate and cooperative with CSW. CSW will remain available as needed. EDWARD Parker              Continued Care and Services - Admitted Since 7/1/2022    Coordination has not been started for this encounter.     Selected Continued Care - Episodes Includes selections from active Coordinated Care Management episodes    Motherhood Connection Episode start date: 6/8/2022   There are no active outsourced providers for this episode.                  Demographic Summary     Row Name 07/05/22 1008       General Information    Admission Type inpatient    Referral Source nursing    Reason for Consult psychosocial concerns;community resources;emotional/coping/adjustment concerns;other (see comments)  NICU admission               Functional Status     Row Name 07/05/22 1009       Mental Status    General Appearance WDL WDL               Psychosocial     Row Name 07/05/22 1009       Behavior WDL    Behavior WDL WDL       Emotion Mood WDL    Emotion/Mood/Affect WDL WDL       Speech WDL    Speech WDL WDL       Perceptual State WDL    Perceptual State WDL WDL       Thought Process WDL    Thought Process WDL WDL       Intellectual Performance WDL    Intellectual Performance WDL WDL       Coping/Stress    Major Change/Loss/Stressor birth               Abuse/Neglect     Row Name 07/05/22 1009       Personal Safety    Physical Signs of Abuse Present no               Legal    No documentation.                Substance Abuse    No documentation.                Patient Forms    No documentation.                   KIARRA Escoto

## 2022-07-06 VITALS
WEIGHT: 271.8 LBS | DIASTOLIC BLOOD PRESSURE: 90 MMHG | TEMPERATURE: 98 F | BODY MASS INDEX: 46.65 KG/M2 | OXYGEN SATURATION: 100 % | SYSTOLIC BLOOD PRESSURE: 132 MMHG | HEART RATE: 81 BPM | RESPIRATION RATE: 16 BRPM

## 2022-07-06 PROCEDURE — 63710000001 PROMETHAZINE PER 25 MG: Performed by: OBSTETRICS & GYNECOLOGY

## 2022-07-06 PROCEDURE — 0503F POSTPARTUM CARE VISIT: CPT | Performed by: OBSTETRICS & GYNECOLOGY

## 2022-07-06 PROCEDURE — 25010000002 ENOXAPARIN PER 10 MG: Performed by: OBSTETRICS & GYNECOLOGY

## 2022-07-06 RX ORDER — IBUPROFEN 800 MG/1
800 TABLET ORAL EVERY 8 HOURS PRN
Qty: 30 TABLET | Refills: 0 | Status: SHIPPED | OUTPATIENT
Start: 2022-07-06 | End: 2023-01-27

## 2022-07-06 RX ORDER — PSEUDOEPHEDRINE HCL 30 MG
100 TABLET ORAL 2 TIMES DAILY
Qty: 20 CAPSULE | Refills: 0 | Status: SHIPPED | OUTPATIENT
Start: 2022-07-06 | End: 2022-11-18

## 2022-07-06 RX ORDER — OXYCODONE HYDROCHLORIDE AND ACETAMINOPHEN 5; 325 MG/1; MG/1
1 TABLET ORAL EVERY 6 HOURS PRN
Qty: 20 TABLET | Refills: 0 | Status: SHIPPED | OUTPATIENT
Start: 2022-07-06 | End: 2022-09-23

## 2022-07-06 RX ADMIN — PROMETHAZINE HYDROCHLORIDE 25 MG: 25 TABLET ORAL at 10:45

## 2022-07-06 RX ADMIN — ENOXAPARIN SODIUM 40 MG: 100 INJECTION SUBCUTANEOUS at 08:57

## 2022-07-06 RX ADMIN — IBUPROFEN 800 MG: 800 TABLET, FILM COATED ORAL at 09:21

## 2022-07-06 RX ADMIN — Medication 1 TABLET: at 08:57

## 2022-07-06 RX ADMIN — IBUPROFEN 800 MG: 800 TABLET, FILM COATED ORAL at 01:00

## 2022-07-06 RX ADMIN — FERROUS SULFATE TAB 325 MG (65 MG ELEMENTAL FE) 325 MG: 325 (65 FE) TAB at 08:57

## 2022-07-06 RX ADMIN — SIMETHICONE 80 MG: 80 TABLET, CHEWABLE ORAL at 10:52

## 2022-07-06 RX ADMIN — LEVOTHYROXINE SODIUM 50 MCG: 0.05 TABLET ORAL at 06:22

## 2022-07-06 NOTE — PLAN OF CARE
Problem: Adult Inpatient Plan of Care  Goal: Plan of Care Review  Outcome: Ongoing, Progressing  Flowsheets (Taken 7/6/2022 0634)  Progress: improving  Plan of Care Reviewed With: patient  Outcome Evaluation: VSS, incision WNL, pain controlled wih po meds, up ad thomas, ambulates to NICU to visit babies, pumping  Goal: Patient-Specific Goal (Individualized)  Outcome: Ongoing, Progressing  Goal: Absence of Hospital-Acquired Illness or Injury  Outcome: Ongoing, Progressing  Intervention: Identify and Manage Fall Risk  Recent Flowsheet Documentation  Taken 7/6/2022 0400 by Althea Casanova, RN  Safety Promotion/Fall Prevention: safety round/check completed  Taken 7/6/2022 0231 by Althea Casanova RN  Safety Promotion/Fall Prevention: safety round/check completed  Taken 7/6/2022 0100 by Althea Casanova RN  Safety Promotion/Fall Prevention: safety round/check completed  Taken 7/6/2022 0005 by Althea Casanova RN  Safety Promotion/Fall Prevention:   safety round/check completed   patient off unit  Taken 7/5/2022 2240 by Althea Casanova RN  Safety Promotion/Fall Prevention: safety round/check completed  Taken 7/5/2022 2028 by Althea Casanova RN  Safety Promotion/Fall Prevention: safety round/check completed  Intervention: Prevent and Manage VTE (Venous Thromboembolism) Risk  Recent Flowsheet Documentation  Taken 7/5/2022 2035 by Althea Casanova RN  Activity Management: (to NICU)   ambulated outside room   up ad thomas  Goal: Optimal Comfort and Wellbeing  Outcome: Ongoing, Progressing  Intervention: Monitor Pain and Promote Comfort  Recent Flowsheet Documentation  Taken 7/6/2022 0100 by Althea Casanova RN  Pain Management Interventions: see MAR  Taken 7/5/2022 2028 by Althea Casanova RN  Pain Management Interventions: see MAR  Intervention: Provide Person-Centered Care  Recent Flowsheet Documentation  Taken 7/5/2022 2028 by Althea Casanova  Patricia, RN  Trust Relationship/Rapport: care explained  Goal: Readiness for Transition of Care  Outcome: Ongoing, Progressing   Goal Outcome Evaluation:  Plan of Care Reviewed With: patient        Progress: improving  Outcome Evaluation: VSS, incision WNL, pain controlled wih po meds, up ad thomas, ambulates to NICU to visit babies, pumping

## 2022-07-06 NOTE — DISCHARGE SUMMARY
Date of Discharge:  2022    Discharge Diagnosis: 1.  Twin intrauterine pregnancy at 34-4/7 weeks, delivered  2.  Prior  delivery  3.  Chronic hypertension with exacerbation    Presenting Problem/History of Present Illness  Pregnancy [Z34.90]       Hospital Course  Patient is a 22 y.o. female presented with worsening hypertension.  The patient was admitted and ultimately required delivery at 34-4/7 weeks.  A repeat  delivery was performed.  For events surrounding delivery, please see the delivery note.      The postpartum course was smooth.  The patient had some mild elevation of her blood pressures, but not in the severe range.  Blood sugars were normal.  By postoperative day #4, the patient was afebrile, tolerating a regular diet and her vital signs were stable.  Blood pressures were normal, as were blood sugars.  At this point, I felt that the patient was stable for discharge and she agreed.  Because her babies are in the NICU, she plans to board after discharge.  She will follow-up on Friday with Dr. Asher for removal of her emmett drain..      Procedures Performed  Procedure(s):   SECTION REPEAT       Consults:   Consults     Date and Time Order Name Status Description    2022  5:51 PM Inpatient Consult to Neonatology Completed               Condition on Discharge: Stable    Vital Signs  Temp:  [97.9 °F (36.6 °C)-98.3 °F (36.8 °C)] 98 °F (36.7 °C)  Heart Rate:  [80-87] 81  Resp:  [16-18] 16  BP: (123-149)/(80-93) 132/90    Physical Exam:   The abdomen is soft and nontender.  The fundus is firm and below the umbilicus.  Emmett drain is clean, dry and intact.  Extremities are equal in size with 1+ bipedal edema.    Discharge Disposition  Home or Self Care    Discharge Medications     Discharge Medications      New Medications      Instructions Start Date   docusate sodium 100 MG capsule   100 mg, Oral, 2 Times Daily      ibuprofen 800 MG tablet  Commonly known as: ADVIL,MOTRIN    800 mg, Oral, Every 8 Hours PRN      oxyCODONE-acetaminophen 5-325 MG per tablet  Commonly known as: PERCOCET   1 tablet, Oral, Every 6 Hours PRN         Continue These Medications      Instructions Start Date   Blood Glucose Test strip   1 each, In Vitro, 4 Times Daily, Test fasting and 2 hours after meals      FreeStyle Lite w/Device kit   1 each, Other, 4 Times Daily, Use to test 4 times daily      glyburide 2.5 MG tablet  Commonly known as: DIAbeta   2.5 mg, Oral, 2 Times Daily With Meals      Lancets misc   1 each, Does not apply, 4 Times Daily, Use to test fasting and 2 hours after meals      levothyroxine 25 MCG tablet  Commonly known as: SYNTHROID, LEVOTHROID   25 mcg, Oral, Daily      Prenatal 28-0.8 MG tablet   1 tablet, Oral, Daily, Please use formulary or generic, with DHA ideal         Stop These Medications    aspirin 81 MG chewable tablet     ondansetron 4 MG tablet  Commonly known as: ZOFRAN            Discharge Diet:     Activity at Discharge:     Follow-up Appointments  Future Appointments   Date Time Provider Department Center   7/20/2022  1:30 PM [facility]   7/20/2022  1:30 PM Bonnie Barber MD [facility]     Additional Instructions for the Follow-ups that You Need to Schedule     Call MD With Problems / Concerns   As directed      Instructions: Call for fever, severe pain, heavy bleeding or any other concerns    Order Comments: Instructions: Call for fever, severe pain, heavy bleeding or any other concerns          Discharge Follow-up with Specified Provider: Friday with Dr Asher   As directed      To: Friday with Dr Asher               Test Results Pending at Discharge       Bernardo Nevarez MD  07/06/22  09:30 EDT    Time: Discharge 25 min

## 2022-07-08 ENCOUNTER — OFFICE VISIT (OUTPATIENT)
Dept: OBSTETRICS AND GYNECOLOGY | Facility: CLINIC | Age: 22
End: 2022-07-08

## 2022-07-08 VITALS
DIASTOLIC BLOOD PRESSURE: 96 MMHG | WEIGHT: 252 LBS | BODY MASS INDEX: 43.02 KG/M2 | HEART RATE: 78 BPM | SYSTOLIC BLOOD PRESSURE: 143 MMHG | HEIGHT: 64 IN

## 2022-07-08 DIAGNOSIS — Z98.890 POST-OPERATIVE STATE: Primary | ICD-10-CM

## 2022-07-08 PROCEDURE — 99213 OFFICE O/P EST LOW 20 MIN: CPT | Performed by: OBSTETRICS & GYNECOLOGY

## 2022-07-08 NOTE — PROGRESS NOTES
"Subjective    is a 22 y.o. female here for Post op exam. Pt is s/p  on 2022 for Twin gestation. Pt has a wound vac in place and needs removed. She has concerns with a baseball size blood clot that she passed.      Chief Complaint   Patient presents with   • Post-op        HPI     22 y.o.    POD #6 after repeat     labor with twins.     BP borderline, but mild range.   Check again next week.       Review of Systems   Eyes: Negative for visual disturbance.   Gastrointestinal: Negative for abdominal pain.   Genitourinary: Positive for vaginal bleeding. Negative for pelvic pain.   Neurological: Negative for headache.        Objective   /96   Pulse 78   Ht 162.6 cm (64\")   Wt 114 kg (252 lb)   Breastfeeding Yes   BMI 43.26 kg/m²   Physical Exam  Constitutional:       General: She is not in acute distress.     Appearance: Normal appearance. She is well-developed. She is obese.   Neck:      Thyroid: No thyromegaly.   Pulmonary:      Effort: No respiratory distress.   Abdominal:      General: Abdomen is flat. There is no distension (incision - clean, dry and intact ).      Palpations: Abdomen is soft.      Tenderness: There is no abdominal tenderness.   Musculoskeletal:      Right lower leg: No edema.      Left lower leg: No edema.   Neurological:      Mental Status: She is alert and oriented to person, place, and time.      Deep Tendon Reflexes: Reflexes normal.   Skin:     General: Skin is warm and dry.   Psychiatric:         Behavior: Behavior normal.         Thought Content: Thought content normal.         Judgment: Judgment normal.   Vitals reviewed.          Assessment/Plan   Diagnoses and all orders for this visit:    1. Post-operative state (Primary)    2. Hypertension, postpartum condition or complication    1) Post op  Removed SANTOS dressing   Incision healing well.   Continue all restrictions.     2) Hypertension   BP mild range  PIH warnings  Repeat next week. "     Luis Alberto Asher MD   7/8/2022  10:30 EDT

## 2022-07-14 ENCOUNTER — POSTPARTUM VISIT (OUTPATIENT)
Dept: OBSTETRICS AND GYNECOLOGY | Facility: CLINIC | Age: 22
End: 2022-07-14

## 2022-07-14 VITALS
HEART RATE: 95 BPM | SYSTOLIC BLOOD PRESSURE: 133 MMHG | DIASTOLIC BLOOD PRESSURE: 95 MMHG | BODY MASS INDEX: 39.09 KG/M2 | WEIGHT: 229 LBS | HEIGHT: 64 IN

## 2022-07-14 DIAGNOSIS — Z98.890 POST-OPERATIVE STATE: ICD-10-CM

## 2022-07-14 PROCEDURE — 99213 OFFICE O/P EST LOW 20 MIN: CPT | Performed by: OBSTETRICS & GYNECOLOGY

## 2022-07-14 NOTE — PROGRESS NOTES
"Subjective    is a 22 y.o. female here for Post op exam. Pt is s/p  on 2022 for twin gestation and HTN.      Chief Complaint   Patient presents with   • Post-op        HPI     22 y.o.    S/P repeat  - twin gestation,  labor  Hypertension   BP check       Review of Systems   Constitutional: Negative for fever.   Eyes: Negative for visual disturbance.   Gastrointestinal: Negative for abdominal pain.   Neurological: Negative for headache.        Objective   /95   Pulse 95   Ht 162.6 cm (64\")   Wt 104 kg (229 lb)   Breastfeeding Yes   BMI 39.31 kg/m²   Physical Exam  Constitutional:       General: She is not in acute distress.     Appearance: Normal appearance. She is well-developed and normal weight.   Neck:      Thyroid: No thyromegaly.   Pulmonary:      Effort: No respiratory distress.   Abdominal:      General: Abdomen is flat. There is distension (incision - clean, dry and intact ).      Palpations: Abdomen is soft.      Tenderness: There is no abdominal tenderness.   Musculoskeletal:      Right lower leg: No edema.      Left lower leg: No edema.   Neurological:      Mental Status: She is alert and oriented to person, place, and time.      Deep Tendon Reflexes: Reflexes normal.   Skin:     General: Skin is warm and dry.   Psychiatric:         Behavior: Behavior normal.         Thought Content: Thought content normal.         Judgment: Judgment normal.   Vitals reviewed.          Assessment/Plan   Diagnoses and all orders for this visit:    1. Hypertension, postpartum condition or complication (Primary)    2. Post-operative state    1) BP improved on no medication   Continue to expect improvement    2) Incision healing well.     Continue pelvic rest no heavy lifting.       Luis Alberto Asher MD   2022  16:07 EDT  "

## 2022-07-20 ENCOUNTER — APPOINTMENT (OUTPATIENT)
Dept: ULTRASOUND IMAGING | Facility: HOSPITAL | Age: 22
End: 2022-07-20

## 2022-09-15 ENCOUNTER — TELEPHONE (OUTPATIENT)
Dept: OBSTETRICS AND GYNECOLOGY | Facility: CLINIC | Age: 22
End: 2022-09-15

## 2022-09-15 NOTE — TELEPHONE ENCOUNTER
Caller: Beth Gaviria    Relationship to patient: Self    Best call back number: [phone]    Patient is needing: PT WANTS TO BE PUT ON BIRTH CONTROL PREFERABLY NEXPLANON, COULD NOT TRANSFER

## 2022-09-23 ENCOUNTER — POSTPARTUM VISIT (OUTPATIENT)
Dept: OBSTETRICS AND GYNECOLOGY | Facility: CLINIC | Age: 22
End: 2022-09-23

## 2022-09-23 VITALS
HEART RATE: 51 BPM | BODY MASS INDEX: 39.44 KG/M2 | SYSTOLIC BLOOD PRESSURE: 118 MMHG | WEIGHT: 231 LBS | HEIGHT: 64 IN | DIASTOLIC BLOOD PRESSURE: 84 MMHG

## 2022-09-23 DIAGNOSIS — Z30.017 NEXPLANON INSERTION: Primary | ICD-10-CM

## 2022-09-23 LAB
B-HCG UR QL: NEGATIVE
EXPIRATION DATE: NORMAL
INTERNAL NEGATIVE CONTROL: NEGATIVE
INTERNAL POSITIVE CONTROL: POSITIVE
Lab: NORMAL

## 2022-09-23 PROCEDURE — 11981 INSERTION DRUG DLVR IMPLANT: CPT | Performed by: OBSTETRICS & GYNECOLOGY

## 2022-09-23 PROCEDURE — 81025 URINE PREGNANCY TEST: CPT | Performed by: OBSTETRICS & GYNECOLOGY

## 2022-09-23 RX ORDER — ONDANSETRON 4 MG/1
4 TABLET, FILM COATED ORAL
COMMUNITY
Start: 2022-05-25

## 2022-09-23 RX ORDER — DEXMETHYLPHENIDATE HYDROCHLORIDE 10 MG/1
10 TABLET ORAL 2 TIMES DAILY
COMMUNITY
End: 2023-01-27

## 2022-09-23 RX ORDER — METOPROLOL TARTRATE 100 MG/1
TABLET ORAL
COMMUNITY
Start: 2022-09-07 | End: 2022-11-18

## 2022-09-23 RX ORDER — NIFEDIPINE 30 MG/1
TABLET, EXTENDED RELEASE ORAL
COMMUNITY
Start: 2022-09-07 | End: 2023-02-03

## 2022-09-23 NOTE — PROGRESS NOTES
Nexplanon Insertion:    Pre Dx: 1) Nexplanon Insertion   Post Dx: 1) Nexplanon Insertion     Brief Urine Lab Results  (Last result in the past 365 days)      Color   Clarity   Blood   Leuk Est   Nitrite   Protein   CREAT   Urine HCG        09/23/22 1119               Negative              Risks, benefits, alternatives explained. All questions answered. Consents signed. LOT# [id number], exp 07/18/2024. NDC# 95888-488-12  Patient placed on examined table in supine position with her nondominant left arm flexed at the elbow and hand resting under her head. Nexplanon to be inserted into nondominant arm. The area for insertion prepped with betadine in a sterile fashion. About 3 mL of 1% lidocaine.     The insertion site was identified approximately 8-10 cm proximal to the humoral epicondyle and 3-4 cm below with sulcus of the triceps and biceps muscle. The skin at the insertion site was stretched by my thumb and index finger. Then inserted the needle tip, bevel side up, at an angle not greater than 30° to the skin surface, just until the skin was penetrated to below the bevel. The applicator was then lowered so that it was parallel to the arm. To minimize the chance of deep incision, the skin was tented upwards with the tip of the needle. The needle was then gently inserted to the full length. Then fixed the device in place on the arm with one hand. I then slowly and carefully retracted the needle back by retracting the release lever. The obturator was seen in the device to determine that the nexplanon had been released.     Both the patient and I were easily able to feel the device under the skin. Steri-Strips were applied at the site, and the arm was gently wrapped with gauze.    There were no complications.   The patient tolerated the procedure well.    She was given a reminder card. She was advised to use condoms as a backup method for at least a week after insertion.    Expectations, warning signs and limitations  reviewed.     Luis Alberto Asher MD  9/23/2022  11:42 EDT      Answers for HPI/ROS submitted by the patient on 9/22/2022  Please describe your symptoms.: Post partum & birth control consultation  Have you had these symptoms before?: No  How long have you been having these symptoms?: 1-4 days  What is the primary reason for your visit?: Other

## 2022-10-26 ENCOUNTER — HOSPITAL ENCOUNTER (EMERGENCY)
Facility: HOSPITAL | Age: 22
Discharge: LEFT BEFORE DISCHARGE | End: 2022-10-26

## 2022-10-26 VITALS — TEMPERATURE: 98.3 F | HEART RATE: 110 BPM | OXYGEN SATURATION: 100 % | RESPIRATION RATE: 16 BRPM

## 2022-10-26 PROCEDURE — 99211 OFF/OP EST MAY X REQ PHY/QHP: CPT

## 2022-10-26 NOTE — ED TRIAGE NOTES
Patient states that she has been having dizzy spells for 2 days now, she states that she has also been having syncopal episodes. Patient states that she has felt like this before when she was a child,but they told her she was dehydrated. Patient has on mask, nurse has on proper ppe

## 2022-11-18 ENCOUNTER — OFFICE VISIT (OUTPATIENT)
Dept: FAMILY MEDICINE CLINIC | Facility: CLINIC | Age: 22
End: 2022-11-18

## 2022-11-18 VITALS
HEIGHT: 64 IN | SYSTOLIC BLOOD PRESSURE: 116 MMHG | BODY MASS INDEX: 41.66 KG/M2 | WEIGHT: 244 LBS | RESPIRATION RATE: 18 BRPM | DIASTOLIC BLOOD PRESSURE: 86 MMHG | OXYGEN SATURATION: 99 % | HEART RATE: 86 BPM

## 2022-11-18 PROBLEM — R55 SYNCOPE: Status: ACTIVE | Noted: 2022-11-18

## 2022-11-18 PROBLEM — I10 ESSENTIAL HYPERTENSION: Status: ACTIVE | Noted: 2022-08-29

## 2022-11-18 PROBLEM — Z87.59 HISTORY OF PRE-ECLAMPSIA: Status: ACTIVE | Noted: 2022-08-29

## 2022-11-18 PROBLEM — F90.9 ATTENTION DEFICIT HYPERACTIVITY DISORDER (ADHD): Status: ACTIVE | Noted: 2022-11-18

## 2022-11-18 PROCEDURE — 99213 OFFICE O/P EST LOW 20 MIN: CPT | Performed by: NURSE PRACTITIONER

## 2022-11-18 RX ORDER — PROMETHAZINE HYDROCHLORIDE 25 MG/1
25 TABLET ORAL EVERY 6 HOURS PRN
COMMUNITY
Start: 2022-09-29 | End: 2023-02-27

## 2022-11-18 RX ORDER — ESCITALOPRAM OXALATE 10 MG/1
10 TABLET ORAL DAILY
Qty: 30 TABLET | Refills: 3 | Status: SHIPPED | OUTPATIENT
Start: 2022-11-18 | End: 2023-01-05

## 2022-11-18 NOTE — PATIENT INSTRUCTIONS
Return in about 1 month (around 12/18/2022) for Recheck .  Call with any questions or concerns.   If symptoms worsen or become severe, go to an ER.

## 2022-11-18 NOTE — PROGRESS NOTES
Subjective   Beth Gaviria is a 22 y.o. female.     History of Present Illness   Patient presents with c/o postpartum depression, ongoing. She reports that she does not feel suicidal or homicidal. She reports that she just does not feel a morales with her children. She delivered twins in July.  She is not currently taking any medications. She has had clinical depression since the age of 10 and has been on various medications for this.  Patient states that she would like to start with Lexapro.     Patient is also here for c/o right ear pain. She reports that this is ongoing for 1 week. She reports that she has a tube in the right ear and it started hurting about 1 week ago. She reports that she has been taking tylenol.     The following portions of the patient's history were reviewed and updated as appropriate: allergies, current medications, past family history, past medical history, past social history, past surgical history and problem list.    Review of Systems   Constitutional: Negative for chills, fatigue and fever.   Respiratory: Negative for cough, chest tightness, shortness of breath and wheezing.    Cardiovascular: Negative for chest pain, palpitations and leg swelling.   Neurological: Negative for dizziness and headache.   Hematological: Negative.    Psychiatric/Behavioral: Positive for sleep disturbance and depressed mood. Negative for suicidal ideas. The patient is nervous/anxious.         Patient denies any suicidal thoughts or planning.        Objective   Physical Exam  Vitals and nursing note reviewed.   Constitutional:       Appearance: She is well-developed.   HENT:      Head: Normocephalic and atraumatic.      Right Ear: Ear canal and external ear normal.      Left Ear: Tympanic membrane, ear canal and external ear normal.      Ears:      Comments: Right blue ear tube noted.  Is patent.   Eyes:      Conjunctiva/sclera: Conjunctivae normal.      Pupils: Pupils are equal, round, and reactive to light.    Cardiovascular:      Rate and Rhythm: Normal rate and regular rhythm.      Heart sounds: Normal heart sounds. No murmur heard.  Pulmonary:      Effort: Pulmonary effort is normal.      Breath sounds: Normal breath sounds.   Neurological:      Mental Status: She is alert and oriented to person, place, and time.   Psychiatric:         Attention and Perception: Attention and perception normal.         Mood and Affect: Affect normal. Mood is anxious and depressed.         Behavior: Behavior normal.         Thought Content: Thought content normal.         Judgment: Judgment normal.         Vitals:    11/18/22 1304   BP: 116/86   Pulse: 86   Resp: 18   SpO2: 99%     Body mass index is 41.88 kg/m².      Procedures    Assessment & Plan   Problems Addressed this Visit    None  Visit Diagnoses     Post partum depression    -  Primary    Relevant Medications    escitalopram (Lexapro) 10 MG tablet    Other Relevant Orders    Ambulatory Referral to Psychiatry    Ambulatory Referral to Behavioral Health      Diagnoses       Codes Comments    Post partum depression    -  Primary ICD-10-CM: F53.0  ICD-9-CM: 648.44, 311         Lexapro 10 mg 1 p.o. QD  Referral to psychiatry  Referral to psychiatry  If symptoms worsen or become severe, go to an ER.   May take zyrtec 10mg 1 p.o. QD       Return in about 1 month (around 12/18/2022) for Recheck .  Answers for HPI/ROS submitted by the patient on 11/14/2022  Please describe your symptoms.: Postpartum depression symptoms and severe anxiety  Have you had these symptoms before?: No  How long have you been having these symptoms?: Greater than 2 weeks  What is the primary reason for your visit?: Other

## 2023-01-05 ENCOUNTER — OFFICE VISIT (OUTPATIENT)
Dept: BEHAVIORAL HEALTH | Facility: CLINIC | Age: 23
End: 2023-01-05
Payer: COMMERCIAL

## 2023-01-05 VITALS
SYSTOLIC BLOOD PRESSURE: 122 MMHG | DIASTOLIC BLOOD PRESSURE: 82 MMHG | OXYGEN SATURATION: 97 % | HEART RATE: 81 BPM | WEIGHT: 240 LBS | BODY MASS INDEX: 41.2 KG/M2

## 2023-01-05 DIAGNOSIS — F90.9 ATTENTION DEFICIT HYPERACTIVITY DISORDER (ADHD), UNSPECIFIED ADHD TYPE: ICD-10-CM

## 2023-01-05 DIAGNOSIS — F60.3 BORDERLINE PERSONALITY DISORDER: ICD-10-CM

## 2023-01-05 DIAGNOSIS — F41.1 GAD (GENERALIZED ANXIETY DISORDER): ICD-10-CM

## 2023-01-05 DIAGNOSIS — F39 UNSPECIFIED MOOD (AFFECTIVE) DISORDER: Primary | ICD-10-CM

## 2023-01-05 DIAGNOSIS — F43.10 PTSD (POST-TRAUMATIC STRESS DISORDER): ICD-10-CM

## 2023-01-05 PROCEDURE — 90792 PSYCH DIAG EVAL W/MED SRVCS: CPT

## 2023-01-05 RX ORDER — RISPERIDONE 0.25 MG/1
0.25 TABLET ORAL NIGHTLY
Qty: 30 TABLET | Refills: 0 | Status: SHIPPED | OUTPATIENT
Start: 2023-01-05 | End: 2023-02-03

## 2023-01-05 RX ORDER — ESCITALOPRAM OXALATE 10 MG/1
20 TABLET ORAL DAILY
Qty: 60 TABLET | Refills: 0 | Status: SHIPPED | OUTPATIENT
Start: 2023-01-05 | End: 2023-02-27

## 2023-01-05 RX ORDER — ASPIRIN 81 MG
81 TABLET,CHEWABLE ORAL DAILY
COMMUNITY
Start: 2022-12-19 | End: 2023-01-27

## 2023-01-05 NOTE — PROGRESS NOTES
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 psych history, Side effects of medication and Medications no longer work   Strengths: motivated     Short-Term Goals: Patient will be compliant with medication management and note improvement in symptoms over the next 4 to 6 weeks or at next scheduled visit.  Long-Term Goals: Patient will continue psychotherapy as well as medication regimen without impairment in daily functioning over the next 6 months.      MEDS ORDERED DURING VISIT:  New Medications Ordered This Visit   Medications   • escitalopram (Lexapro) 10 MG tablet     Sig: Take 2 tablets by mouth Daily.     Dispense:  60 tablet     Refill:  0   • risperiDONE (RisperDAL) 0.25 MG tablet     Sig: Take 1 tablet by mouth Every Night.     Dispense:  30 tablet     Refill:  0       Return 4 WEEK F/U LEXAPRO INCREASED, RISPERDAL AT BEDTIME ADDED, COUNSELLING RECOMMENDED, for Standard Follow Up.    This document has been electronically signed by JUAN Marroquin  January 5, 2023 14:24 EST    Part of this note may be an electronic transcription/translation of spoken language to printed text using the Dragon Dictation System.

## 2023-01-27 ENCOUNTER — OFFICE VISIT (OUTPATIENT)
Dept: OBSTETRICS AND GYNECOLOGY | Facility: CLINIC | Age: 23
End: 2023-01-27
Payer: COMMERCIAL

## 2023-01-27 VITALS
BODY MASS INDEX: 40.12 KG/M2 | HEIGHT: 64 IN | WEIGHT: 235 LBS | HEART RATE: 86 BPM | SYSTOLIC BLOOD PRESSURE: 136 MMHG | DIASTOLIC BLOOD PRESSURE: 93 MMHG

## 2023-01-27 DIAGNOSIS — R10.2 PELVIC PAIN: ICD-10-CM

## 2023-01-27 DIAGNOSIS — Z97.5 BREAKTHROUGH BLEEDING ON NEXPLANON: ICD-10-CM

## 2023-01-27 DIAGNOSIS — N92.1 BREAKTHROUGH BLEEDING ON NEXPLANON: ICD-10-CM

## 2023-01-27 DIAGNOSIS — Z01.419 ENCOUNTER FOR GYNECOLOGICAL EXAMINATION WITHOUT ABNORMAL FINDING: Primary | ICD-10-CM

## 2023-01-27 PROCEDURE — 2014F MENTAL STATUS ASSESS: CPT | Performed by: OBSTETRICS & GYNECOLOGY

## 2023-01-27 PROCEDURE — 3008F BODY MASS INDEX DOCD: CPT | Performed by: OBSTETRICS & GYNECOLOGY

## 2023-01-27 PROCEDURE — 99395 PREV VISIT EST AGE 18-39: CPT | Performed by: OBSTETRICS & GYNECOLOGY

## 2023-01-27 RX ORDER — OMEPRAZOLE 40 MG/1
40 CAPSULE, DELAYED RELEASE ORAL
COMMUNITY
Start: 2023-01-15

## 2023-01-27 RX ORDER — FAMOTIDINE 20 MG/1
20 TABLET, FILM COATED ORAL
COMMUNITY
Start: 2023-01-12 | End: 2023-02-27

## 2023-01-27 RX ORDER — IBUPROFEN 800 MG/1
800 TABLET ORAL EVERY 8 HOURS PRN
Qty: 60 TABLET | Refills: 3 | OUTPATIENT
Start: 2023-01-27 | End: 2023-02-22

## 2023-01-27 RX ORDER — METOPROLOL TARTRATE 100 MG/1
TABLET ORAL
COMMUNITY
Start: 2023-01-23 | End: 2023-02-03

## 2023-01-27 NOTE — PROGRESS NOTES
GYN Annual Exam     CC- Here for annual exam.     Beth Gaviria is a 23 y.o. female who presents for annual well woman exam. Periods are regular every 28-30 days, lasting 14 days. Dysmenorrhea:severe, occurring throughout menses. Cyclic symptoms include none. No intermenstrual bleeding, spotting, or discharge.  Pt c/o heavy periods for 12 days out of 14 days.     OB History        2    Para   2    Term   1       1    AB        Living   3       SAB        IAB        Ectopic        Molar        Multiple   1    Live Births   3                Current contraception: Nexplanon- inserted 2022  History of abnormal Pap smear: no  Family history of uterine, colon or ovarian cancer: no  History of abnormal mammogram: no  Family history of breast cancer: yes - Grandmother   Last Pap : 12/10/2021 NIL     Past Medical History:   Diagnosis Date   • ADHD (attention deficit hyperactivity disorder)    • Anxiety    • Chronic hypertension     not issues currently, previously on labatelol   • Depression    • Dichorionic diamniotic twin pregnancy in second trimester 2022   • Gestational diabetes    • Gestational hypertension    • Head injury     2014 Mild Concussion   • Hypothyroid in pregnancy, antepartum, third trimester 2022   • Insomnia    • PONV (postoperative nausea and vomiting)    • Preeclampsia    • PTSD (post-traumatic stress disorder) 2023   • Varicella        Past Surgical History:   Procedure Laterality Date   •  SECTION     •  SECTION N/A 2022    Procedure:  SECTION REPEAT;  Surgeon: Luis Alberto Asher MD;  Location: [facility] DELIVERY;  Service: Obstetrics/Gynecology;  Laterality: N/A;   • CHOLECYSTECTOMY     • LAPAROSCOPIC CHOLECYSTECTOMY  2018   • TONSILLECTOMY     • WISDOM TOOTH EXTRACTION           Current Outpatient Medications:   •  famotidine (Pepcid) 20 MG tablet, 20 mg., Disp: , Rfl:   •  metoprolol tartrate (LOPRESSOR) 100 MG tablet,  Take 1 tab by mouth the night before the procedure and 1 hour prior to procedure time., Disp: , Rfl:   •  omeprazole (priLOSEC) 40 MG capsule, 40 mg., Disp: , Rfl:   •  escitalopram (Lexapro) 10 MG tablet, Take 2 tablets by mouth Daily., Disp: 60 tablet, Rfl: 0  •  ibuprofen (ADVIL,MOTRIN) 800 MG tablet, Take 1 tablet by mouth Every 8 (Eight) Hours As Needed for Mild Pain., Disp: 60 tablet, Rfl: 3  •  NIFEdipine XL (PROCARDIA XL) 30 MG 24 hr tablet, , Disp: , Rfl:   •  ondansetron (ZOFRAN) 4 MG tablet, Take 4 mg by mouth., Disp: , Rfl:   •  promethazine (PHENERGAN) 25 MG tablet, Take 25 mg by mouth Every 6 (Six) Hours As Needed., Disp: , Rfl:   •  risperiDONE (RisperDAL) 0.25 MG tablet, Take 1 tablet by mouth Every Night., Disp: 30 tablet, Rfl: 0    Allergies   Allergen Reactions   • Buspirone Swelling and Other (See Comments)     SERVE SWELLING IN THE FACE PER PT  Heart palpitations   • Lamotrigine Rash and Hives   • Norelgestromin-Eth Estradiol Swelling     Birth control patch adhesive per pt   • Oxcarbazepine Rash   • Amphetamine-Dextroamphetamine Palpitations   • Dicyclomine Rash   • Metoclopramide Irritability and Other (See Comments)     Causes agitation and aggression       Social History     Tobacco Use   • Smoking status: Former     Packs/day: 0.25     Years: 2.00     Pack years: 0.50     Types: Electronic Cigarette, Cigarettes     Start date: 2016     Quit date: 10/31/2017     Years since quittin.2   • Smokeless tobacco: Never   Vaping Use   • Vaping Use: Former   • Quit date: 10/31/2017   Substance Use Topics   • Alcohol use: Yes   • Drug use: Not Currently       Family History   Problem Relation Age of Onset   • Diabetes Mother    • Heart disease Mother    • Hypertension Mother    • Coronary artery disease Mother    • Diabetes Father    • Cancer Father    • Hypertension Father    • Heart attack Father    • Cancer Maternal Grandmother         breast   • Breast cancer Maternal Grandmother    •  "Heart attack Paternal Grandmother    • Stroke Paternal Grandmother    • Deep vein thrombosis Maternal Uncle    • Ovarian cancer Neg Hx    • Uterine cancer Neg Hx    • Colon cancer Neg Hx    • Pulmonary embolism Neg Hx        Review of Systems   Constitutional: Negative for chills and fever.   Gastrointestinal: Negative for abdominal pain, constipation and diarrhea.   Genitourinary: Positive for menstrual problem. Negative for pelvic pain, vaginal bleeding and vaginal discharge.   All other systems reviewed and are negative.      /93   Pulse 86   Ht 162.6 cm (64\")   Wt 107 kg (235 lb)   LMP 01/13/2023   BMI 40.34 kg/m²     Physical Exam  Constitutional:       General: She is not in acute distress.     Appearance: She is well-developed. She is obese.   Genitourinary:      Vulva normal.      Right Labia: No lesions or Bartholin's cyst.     Left Labia: No lesions or Bartholin's cyst.     No inguinal adenopathy present in the right or left side.     No vaginal discharge or bleeding.        Right Adnexa: not tender, not full and no mass present.     Left Adnexa: not tender, not full and no mass present.     No cervical motion tenderness or friability.      Uterus is not enlarged or tender.      No uterine mass detected.     Uterus is anteverted.   Breasts:     Right: No inverted nipple, mass or nipple discharge.      Left: No inverted nipple, mass or nipple discharge.   HENT:      Head: Normocephalic and atraumatic.      Nose: Nose normal.   Eyes:      Conjunctiva/sclera: Conjunctivae normal.      Pupils: Pupils are equal, round, and reactive to light.   Neck:      Thyroid: No thyromegaly.   Cardiovascular:      Rate and Rhythm: Normal rate and regular rhythm.      Heart sounds: Normal heart sounds. No murmur heard.  Pulmonary:      Effort: Pulmonary effort is normal. No respiratory distress.      Breath sounds: Normal breath sounds.   Abdominal:      General: Abdomen is flat. There is no distension.      " Palpations: Abdomen is soft.      Tenderness: There is no abdominal tenderness.   Musculoskeletal:         General: No deformity. Normal range of motion.      Cervical back: Normal range of motion and neck supple.      Right lower leg: No edema.      Left lower leg: No edema.   Lymphadenopathy:      Lower Body: No right inguinal adenopathy. No left inguinal adenopathy.   Neurological:      Mental Status: She is alert and oriented to person, place, and time.   Skin:     General: Skin is warm and dry.      Findings: No erythema.   Psychiatric:         Behavior: Behavior normal.         Thought Content: Thought content normal.         Judgment: Judgment normal.   Vitals reviewed. Exam conducted with a chaperone present.               Assessment     Diagnoses and all orders for this visit:    1. Encounter for gynecological examination without abnormal finding (Primary)    2. Breakthrough bleeding on Nexplanon  -     Chlamydia trachomatis, Neisseria gonorrhoeae, Trichomonas vaginalis, PCR - Swab, Cervix  -     Urine Culture - , Urine, Clean Catch  -     US Non-ob Transvaginal    3. Pelvic pain  -     Chlamydia trachomatis, Neisseria gonorrhoeae, Trichomonas vaginalis, PCR - Swab, Cervix  -     Urine Culture - , Urine, Clean Catch  -     US Non-ob Transvaginal    Other orders  -     ibuprofen (ADVIL,MOTRIN) 800 MG tablet; Take 1 tablet by mouth Every 8 (Eight) Hours As Needed for Mild Pain.  Dispense: 60 tablet; Refill: 3    1) GYN exam, STD screen done   Expectations reviewed.     2) Breakthrough bleeding/Dysmenorrhea on Nexplanon  Check cultures and ultrasound   Would need IUD/OCP to consider alternative.   Reviewed.        Plan     1) Breast Health - Clinical breast exam yearly, Discussed [employer] recommendations for breast cancer screening, and Self breast awareness monthly  2) Pap - up to date   3) Smoking status- non-smoker   4) Encouraged between 7-8 hours of good sleep per night.   5) Follow up prn  and one year.       Luis Alberto Asher MD   1/27/2023  14:32 EST

## 2023-01-29 LAB
BACTERIA UR CULT: NORMAL
BACTERIA UR CULT: NORMAL

## 2023-01-31 ENCOUNTER — TELEPHONE (OUTPATIENT)
Dept: OBSTETRICS AND GYNECOLOGY | Facility: CLINIC | Age: 23
End: 2023-01-31
Payer: COMMERCIAL

## 2023-01-31 LAB
C TRACH RRNA SPEC QL NAA+PROBE: NEGATIVE
N GONORRHOEA RRNA SPEC QL NAA+PROBE: NEGATIVE
T VAGINALIS RRNA SPEC QL NAA+PROBE: NEGATIVE

## 2023-01-31 NOTE — TELEPHONE ENCOUNTER
----- Message from Luis Alberto Asher MD sent at 1/29/2023  9:14 AM EST -----  Galilea, urine culture did not show UTI. Please let her know. Thanks Dr. Asher

## 2023-02-03 ENCOUNTER — TELEPHONE (OUTPATIENT)
Dept: OBSTETRICS AND GYNECOLOGY | Facility: CLINIC | Age: 23
End: 2023-02-03

## 2023-02-03 ENCOUNTER — TELEMEDICINE (OUTPATIENT)
Dept: BEHAVIORAL HEALTH | Facility: CLINIC | Age: 23
End: 2023-02-03
Payer: COMMERCIAL

## 2023-02-03 DIAGNOSIS — F33.1 MODERATE EPISODE OF RECURRENT MAJOR DEPRESSIVE DISORDER: Primary | ICD-10-CM

## 2023-02-03 DIAGNOSIS — F90.0 ADHD (ATTENTION DEFICIT HYPERACTIVITY DISORDER), INATTENTIVE TYPE: ICD-10-CM

## 2023-02-03 DIAGNOSIS — Z79.899 HIGH RISK MEDICATION USE: ICD-10-CM

## 2023-02-03 DIAGNOSIS — F60.3 BORDERLINE PERSONALITY DISORDER: ICD-10-CM

## 2023-02-03 DIAGNOSIS — F41.1 GAD (GENERALIZED ANXIETY DISORDER): ICD-10-CM

## 2023-02-03 PROBLEM — F39 UNSPECIFIED MOOD (AFFECTIVE) DISORDER: Status: ACTIVE | Noted: 2023-02-03

## 2023-02-03 PROCEDURE — 99214 OFFICE O/P EST MOD 30 MIN: CPT

## 2023-02-03 NOTE — PROGRESS NOTES
"Patient assessed today via MyChart through EPIC pt is at home in a secure environment and verbalizes privacy during interview. NIR Sequeira is at home in a secure environment using a secure laptop. The patient's condition being diagnosed/treated is appropriate for telemedicine. The provider identified himself as well as his credentials.   The patient, and/or patient's guardian, consent to be seen remotely, and when consent is given they understand that the consent allows for patient identifiable information to be sent to a third party as needed.   They may refuse to be seen remotely at any time. The electronic data is encrypted and password protected, and the patient and/or guardian has been advised of the potential risks to privacy not withstanding such measures.    [facility]  [facility]  [address] [place] [address]  [phone]     Dinh Gaviria is a 23 y.o. female who presents today 02/03/2023 for follow up    Chief Complaint: \"{Psych dx:71322}\" per pt.  .  History of Present Illness: Prior note/notes reviewed.    Past Psychiatric History:  · Began Treatment:Ongoing since childhood  · Diagnoses:Depression, anxiety, binge eating disorder, PTSD, ADHD, borderline personality disorder, self-harm, suicidality  · Psychiatrist:Garo Nguyễn at [facility] last seen 2017  · Therapist:Not currently, has not seen a therapist in over a year  · Admission History:History of SI and SMB  · Medication Trials:Multiple past meds reported, patient reports some she was on for several weeks others were stopped prematurely due to side effects.  Zoloft, Prozac worsened SI, Abilify, Seroquel aggressiveness, Lamictal rash, BuSpar agitation, Bentyl, amitriptyline SI.  Only medications that patient reports that have been effective in the past were Effexor that they stopped, patient cannot remember why, reports Focalin helps with ADHD stopped due to " pregnancy.  · Self Harm: History of cutting on arms, legs, stomach, last reported was 2 years ago  · Suicide Attempts:Patient reports last thoughts of suicide was around 3 years ago, no plan or intent currently or current SI      ***    Since last appt pt reports *** in S/S of {Psych dx:03204}. Pt {endorsement:36771} daily compliance with medications. Pt {endorsement:95941} new medications, pt {endorsement:24192} new medical problems. Current S/S reviewed with pt, PHQ/DIETER scores reviewed with pt and are ***. Sleep disturbance is {endorsement:38076} and is described as {Symptoms; sleep quality:43093}. Risk vs benefit of medications discussed including potential side effects, side effects to meds:{SYMPTOMS; SIDE EFFECTS, ANXIETY MEDS:17082}.  Medical/surgical/social/family history reviewed and updated, problem list reviewed/updated, medications and allergies reviewed/updated, recent labs reviewed.                       PHQ-9 Depression Screening  Little interest or pleasure in doing things?     Feeling down, depressed, or hopeless?     Trouble falling or staying asleep, or sleeping too much?     Feeling tired or having little energy?     Poor appetite or overeating?     Feeling bad about yourself - or that you are a failure or have let yourself or your family down?     Trouble concentrating on things, such as reading the newspaper or watching television?     Moving or speaking so slowly that other people could have noticed? Or the opposite - being so fidgety or restless that you have been moving around a lot more than usual?     Thoughts that you would be better off dead, or of hurting yourself in some way?     PHQ-9 Total Score     If you checked off any problems, how difficult have these problems made it for you to do your work, take care of things at home, or get along with other people?       GAD7 DOCUMENTATION    Feeling nervous, anxious or on edge     Not being able to stop or control worrying     Worrying too  much about different things     Trouble relaxing     Being so restless that it is hard to sit still     Becoming easily annoyed or irritable     Feeling Afraid as if something awful might happen     DIETER Total Score     If you checked any problems, how difficult have these problems made it for you to do your work, take care of things at home or get along with other people?       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     House:Patient was born in [place] but raised in [place], moved to [place] roughly 2 years ago for partner      Social History     Socioeconomic History   • Marital status: Single   • Number of children: 1   Tobacco Use   • Smoking status: Former     Packs/day: 0.25     Years: 2.00     Pack years: 0.50     Types: Electronic Cigarette, Cigarettes     Start date: 2016     Quit date: 10/31/2017     Years since quittin.2   • Smokeless tobacco: Never   Vaping Use   • Vaping Use: Former   • Quit date: 10/31/2017   Substance and Sexual Activity   • Alcohol use: Yes   • Drug use: Not Currently   • Sexual activity: Yes     Partners: Male     Birth control/protection: Nexplanon       Family History   Problem Relation Age of Onset   • Diabetes Mother    • Heart disease Mother    • Hypertension Mother    • Coronary artery disease Mother    • Diabetes Father    • Cancer Father    • Hypertension Father    • Heart attack Father    • Cancer Maternal Grandmother         breast   • Breast cancer Maternal Grandmother    • Heart attack Paternal Grandmother    • Stroke Paternal Grandmother    • Deep vein thrombosis Maternal Uncle    • Ovarian cancer Neg Hx    • Uterine cancer Neg Hx    • Colon cancer Neg Hx    • Pulmonary embolism Neg Hx        Problem List:  Patient Active Problem List    Diagnosis   • Anxiety   • Depression   • H/O gestational diabetes mellitus, not currently pregnant   • Menorrhagia with regular cycle   • Personality disorder (HCC)   • Prediabetes   • Seasonal allergies   • Morbid obesity with BMI of 45.0-49.9, adult ([facility])   • Previous  delivery, antepartum   • Pregnancy   • Twin gestation, dichorionic/diamniotic (two placentae, two amniotic sacs)   • Gestational diabetes mellitus (GDM) controlled on oral hypoglycemic drug, antepartum   • Pre-existing essential hypertension complicating pregnancy, third trimester   • Dichorionic diamniotic twin pregnancy in third trimester   • Chronic hypertension with exacerbation during pregnancy in third trimester   • Diet controlled gestational diabetes mellitus (GDM) in third trimester   • Macrosomia of fetus affecting management of mother in third trimester   • Polyhydramnios in third trimester   • Maternal morbid obesity in third trimester, antepartum (HCC)   • Previous  delivery, antepartum   • Hypothyroid in pregnancy, antepartum, third trimester   • Decreased fetal movements in third trimester   • S/P  section   • Attention deficit hyperactivity disorder (ADHD)   • Essential hypertension   • History of pre-eclampsia   • Syncope   • DIETER (generalized anxiety disorder)   • PTSD (post-traumatic stress disorder)     Past Medical History:  Past Medical History:   Diagnosis Date   • ADHD (attention deficit hyperactivity disorder)    • Anxiety    • Chronic hypertension     not issues currently, previously on labatelol   • Depression    • Dichorionic diamniotic twin pregnancy in second trimester 2022   • Gestational diabetes 2018   • Gestational hypertension    • Head injury     2014 Mild Concussion   • Hypothyroid in pregnancy, antepartum, third trimester 2022   • Insomnia    • PONV (postoperative nausea and vomiting)    • Preeclampsia 2018   • PTSD (post-traumatic stress disorder) 2023   • Varicella       Past Medical History Pertinent Negatives:   Diagnosis Date Noted   • Abnormal ECG 12/10/2021   • Abnormal Pap smear of cervix 12/10/2021   • Alcoholism (HCC) 12/10/2021   • Anemia 12/10/2021   • Asthma 12/10/2021   • Bipolar disorder (HCC) 12/10/2021   • Bleeding disorder (HCC) 12/10/2021   • Breast cancer (HCC) 12/10/2021   • Cancer (HCC) 12/10/2021   • Cervical cancer (HCC) 12/10/2021   • Cervical dysplasia 12/10/2021   • CHF (congestive heart failure) (HCC) 12/10/2021   • Chlamydia 12/10/2021   • Chronic kidney disease 12/10/2021   • Cirrhosis (HCC) 12/10/2021   • Clotting disorder (HCC) 12/10/2021   • Colorectal cancer (HCC) 12/10/2021   • COPD (chronic obstructive pulmonary disease) (HCC) 12/10/2021   • Coronary artery disease 12/10/2021   • Crohn's disease (HCC) 12/10/2021   • Deep vein thrombosis (HCC) 12/10/2021   • Ectopic pregnancy 12/10/2021   • Endometriosis 12/10/2021   • Female infertility 12/10/2021   • Fibroid 12/10/2021   • GBS (group B streptococcus) infection 12/10/2021   • Gonorrhea 12/10/2021   • Gout 12/10/2021   • Hard to intubate 12/10/2021   • Hepatitis B 12/10/2021   • Hepatitis C 12/10/2021   • Herpes 12/10/2021   • History of transfusion 12/10/2021   • HIV disease (HCC) 12/10/2021   • HPV (human papilloma virus) infection 12/10/2021   • Hyperemesis gravidarum 12/10/2021   • Hyperlipidemia 12/10/2021   • Infectious viral hepatitis 12/10/2021   • Kidney stone 12/10/2021   • Liver disease 12/10/2021   • Lupus (HCC) 12/10/2021   • Malignant hyperthermia due to anesthesia 12/10/2021   • Migraine 12/10/2021   • Myocardial infarction (HCC) 12/10/2021   • Osteoarthritis 12/10/2021   • Osteopenia 12/10/2021   • Osteoporosis 12/10/2021   • Ovarian cancer (HCC) 12/10/2021   • Ovarian cyst 12/10/2021   • Pelvic prolapse 12/10/2021   • PID (pelvic inflammatory disease) 12/10/2021   • Placenta accreta 12/10/2021   • Placenta previa 12/10/2021   • PMS (premenstrual syndrome) 12/10/2021   •  Polycystic ovary syndrome 12/10/2021   • Pulmonary arterial hypertension ([facility]) 12/10/2021   • Pulmonary embolism ([facility]) 12/10/2021   • Recurrent pregnancy loss, antepartum condition or complication 12/10/2021   • Rh incompatibility 12/10/2021   • Seizures ([facility]) 12/10/2021   • Sickle cell anemia ([facility]) 12/10/2021   • Spinal headache 12/10/2021   • Stroke ([facility]) 12/10/2021   • Substance abuse ([facility]) 12/10/2021   • Syphilis 12/10/2021   • TIA (transient ischemic attack) 12/10/2021   • Tuberculosis 12/10/2021   • Ulcerative colitis ([facility]) 12/10/2021   • Urinary tract infection 12/10/2021   • Urogenital trichomoniasis 12/10/2021   • Uterine cancer ([facility]) 12/10/2021   • Vaginal cancer ([facility]) 12/10/2021   • Vulvar cancer ([facility]) 12/10/2021     Allergy:   Allergies   Allergen Reactions   • Buspirone Swelling and Other (See Comments)     SERVE SWELLING IN THE FACE PER PT  Heart palpitations   • Lamotrigine Rash and Hives   • Norelgestromin-Eth Estradiol Swelling     Birth control patch adhesive per pt   • Oxcarbazepine Rash   • Amphetamine-Dextroamphetamine Palpitations   • Dicyclomine Rash   • Metoclopramide Irritability and Other (See Comments)     Causes agitation and aggression        Current Medications:   Current Outpatient Medications   Medication Sig Dispense Refill   • escitalopram (Lexapro) 10 MG tablet Take 2 tablets by mouth Daily. 60 tablet 0   • famotidine (Pepcid) 20 MG tablet 20 mg.     • ibuprofen (ADVIL,MOTRIN) 800 MG tablet Take 1 tablet by mouth Every 8 (Eight) Hours As Needed for Mild Pain. 60 tablet 3   • metoprolol tartrate (LOPRESSOR) 100 MG tablet Take 1 tab by mouth the night before the procedure and 1 hour prior to procedure time.     • NIFEdipine XL (PROCARDIA XL) 30 MG 24 hr tablet      • omeprazole (priLOSEC) 40 MG capsule 40 mg.     • ondansetron (ZOFRAN) 4 MG tablet Take 4 mg by mouth.     • promethazine (PHENERGAN) 25 MG tablet Take 25 mg by mouth Every 6 (Six) Hours As Needed.     • risperiDONE  (RisperDAL) 0.25 MG tablet Take 1 tablet by mouth Every Night. 30 tablet 0     No current facility-administered medications for this visit.       Mental Status Exam:   ***    Review of Systems:   History obtained from chart review and the patient  General ROS: negative  Psychological ROS: + ***  Respiratory ROS: negative  Cardiovascular ROS: negative  Neurological ROS: negative    Physical Exam  Constitutional:       Appearance: Normal appearance, appears in no acute distress  Cardiovascular:      Comments: No chest pain reported  Pulmonary:      Effort: Pulmonary effort is normal.      Comments: No shortness of breath reported  Musculoskeletal:      Comments: No recent falls/injury reported   Neurological:      Mental Status: He is alert and oriented to person, place, and time.   Psychiatric:         Thought Content: Thought content normal.         Judgment: Judgment normal.   ***  Vital Signs:   There were no vitals taken for this visit.  There is no height or weight on file to calculate BMI.    Lab Results: .de  Office Visit on 01/27/2023   Component Date Value Ref Range Status   • Chlamydia trachomatis, BRYAN 01/27/2023 Negative  Negative Final   • Gonococcus by BRYAN 01/27/2023 Negative  Negative Final   • Trichomonas vaginosis 01/27/2023 Negative  Negative Final   • Urine Culture 01/27/2023 Final report   Final   • Result 1 01/27/2023 Comment   Final    Comment: Culture shows less than 10,000 colony forming units of bacteria per  milliliter of urine. This colony count is not generally considered  to be clinically significant.     Postpartum Visit on 09/23/2022   Component Date Value Ref Range Status   • HCG, Urine, QL 09/23/2022 Negative  Negative Final   • Lot Number 09/23/2022 [id number]   Final   • Internal Positive Control 09/23/2022 Positive  Positive, Passed Final   • Internal Negative Control 09/23/2022 Negative  Negative, Passed Final   • Expiration Date 09/23/2022 10/31/2023   Final       Assessment &  Plan   {Assess/PlanSmartLinks:23217}    TREATMENT PLAN/GOALS: Continue supportive psychotherapy efforts and medications as indicated. Treatment and medication options discussed during today's visit. Patient acknowledged and verbally consented to continue with current treatment plan and was educated on the importance of compliance with treatment and follow-up appointments.    Barriers: {[facility] barriers:92614}   Strengths: {patient strengths:73305}    Short-Term Goals: Patient will be compliant with medication management and note improvement in symptoms over the next 4 to 6 weeks or at next scheduled visit.  Long-Term Goals: Patient will continue psychotherapy as well as medication regimen without impairment in daily functioning over the next 6 months.      Progress toward goal: {tnpt}  Functional Status: {rsfunctionalstatus:11178}  Prognosis: {tnprognosis:59039}    PLAN OF CARE: 2023  1. Patient was seen for {Psych dx:92508} {MILD, MOD, SEV:46992} intensity. PHQ/DIETER scores: ***/***  2. Above listed condition/conditions are {improving/stable/worsenin}.  3. Current psychiatric medications: ***. Medication Changes: ***  4. Follow Up with Hua: ***    PSYCHOEDUCATION:  • A thorough discussion was had that included review of disease process, need for continued monitoring and additional treatment options including use of pharmacological and non-pharmacological approaches to care, decisions were made and agreed upon by patient and provider.   • Discussed the risks, benefits, and potential side effects of the medications; patient ackowledged and verbally consented.   • Patient is advised to avoid driving or operating heavy machinery if they feel drowsy or over sedated.   • Patient is agreeable to call the office with any worsening of symptoms or onset of intolerable side effects. Patient is agreeable to call 911 or go to the nearest ER should he/she begin having SI/HI.     BASSEM reviewed 2023    [facility]  ORDERED DURING VISIT:  No orders of the defined types were placed in this encounter.      Part of this note may be an electronic transcription/translation of spoken language to printed text using the Dragon Dictation System.    A total of *** minutes was spent caring for this patient. That time was spent reviewing past notes, updating patient information, assessing patient for S/S of condition being treated, educating patient on different treatment options, placing orders, and documenting in the record.

## 2023-02-03 NOTE — PROGRESS NOTES
"Patient assessed today via MyChart through EPIC pt is in her car in a secure environment and verbalizes privacy during interview. NIR Sequeira is at home in a secure environment using a secure laptop. The patient's condition being diagnosed/treated is appropriate for telemedicine. The provider identified himself as well as his credentials.   The patient, and/or patient's guardian, consent to be seen remotely, and when consent is given they understand that the consent allows for patient identifiable information to be sent to a third party as needed.   They may refuse to be seen remotely at any time. The electronic data is encrypted and password protected, and the patient and/or guardian has been advised of the potential risks to privacy not withstanding such measures.    [facility]  [facility]  [address] [address]  [phone]     Subjective   Beth Gaviria is a 23 y.o. female who presents today 02/03/2023 for follow up    Chief Complaint: \"medication have been difficult\" per pt.  .  History of Present Illness: Prior note/notes reviewed.    Significant psychiatric history reported, multiple failed meds reported, last time patient was seen Lexapro was increased to 20 mg a day and low-dose Risperdal was added at bedtime to help with sleep/agitation, significant nightmares reported, homicidal in nature, are very distressing, patient wakes up every night almost every hour.  Believes increased dose of Lexapro is helping.  Irritability is mildly improved, August today is on ADHD, interested in restarting Focalin.     Since last appt pt reports mild improvement in S/S of depression and irritability. Pt endorses daily compliance with medications. Pt denies new medications, pt denies new medical problems. Current S/S reviewed with pt, PHQ/DIETER scores reviewed with pt and are unchanged. Sleep disturbance is endorses and is described as nightime awakenings and " nightmares. Risk vs benefit of medications discussed including potential side effects, side effects to meds:intense nightmares.      Medical/surgical/social/family history reviewed and updated, problem list reviewed/updated, medications and allergies reviewed/updated, recent labs reviewed.                       PHQ-9 Depression Screening  Little interest or pleasure in doing things? (P) 2-->more than half the days   Feeling down, depressed, or hopeless? (P) 1-->several days   Trouble falling or staying asleep, or sleeping too much? (P) 3-->nearly every day   Feeling tired or having little energy? (P) 1-->several days   Poor appetite or overeating? (P) 3-->nearly every day   Feeling bad about yourself - or that you are a failure or have let yourself or your family down? (P) 0-->not at all   Trouble concentrating on things, such as reading the newspaper or watching television? (P) 0-->not at all   Moving or speaking so slowly that other people could have noticed? Or the opposite - being so fidgety or restless that you have been moving around a lot more than usual? (P) 2-->more than half the days   Thoughts that you would be better off dead, or of hurting yourself in some way? (P) 0-->not at all   PHQ-9 Total Score (P) 12   If you checked off any problems, how difficult have these problems made it for you to do your work, take care of things at home, or get along with other people? (P) somewhat difficult     GAD7 DOCUMENTATION    Feeling nervous, anxious or on edge (P) 3   Not being able to stop or control worrying (P) 3   Worrying too much about different things (P) 3   Trouble relaxing (P) 3   Being so restless that it is hard to sit still (P) 2   Becoming easily annoyed or irritable (P) 3   Feeling Afraid as if something awful might happen (P) 1   DIETER Total Score (P) 18   If you checked any problems, how difficult have these problems made it for you to do your work, take care of things at home or get along with  other people? 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     House:Patient was born in [place] but raised in [place], moved to [place] roughly 2 years ago for partner      Social History     Socioeconomic History   • Marital status: Single   • Number of children: 1   Tobacco Use   • Smoking status: Former     Packs/day: 0.25     Years: 2.00     Pack years: 0.50     Types: Electronic Cigarette, Cigarettes     Start date: 2016     Quit date: 10/31/2017     Years since quittin.2   • Smokeless tobacco: Never   Vaping Use   • Vaping Use: Former   • Quit date: 10/31/2017   Substance and Sexual Activity   • Alcohol use: Yes   • Drug use: Not Currently   • Sexual activity: Yes     Partners: Male     Birth control/protection: Nexplanon       Family History   Problem Relation Age of Onset   • Diabetes Mother    • Heart disease Mother    • Hypertension Mother    • Coronary artery disease Mother    • Diabetes Father    • Cancer Father    • Hypertension Father    • Heart attack Father    • Cancer Maternal Grandmother         breast   • Breast cancer Maternal Grandmother    • Heart attack Paternal Grandmother    • Stroke Paternal Grandmother    • Deep vein thrombosis Maternal Uncle    • Ovarian cancer Neg Hx    • Uterine cancer Neg Hx    • Colon cancer Neg Hx    • Pulmonary embolism Neg Hx        Problem List:  Patient Active Problem List   Diagnosis   • Anxiety   • Depression   • H/O gestational diabetes mellitus, not currently pregnant   • Menorrhagia with regular cycle   • Personality disorder (HCC)   • Prediabetes   • Seasonal allergies   • Morbid obesity with BMI of 45.0-49.9, adult (HCC)   • Previous  delivery, antepartum   • Pregnancy   • Twin gestation,  dichorionic/diamniotic (two placentae, two amniotic sacs)   • Gestational diabetes mellitus (GDM) controlled on oral hypoglycemic drug, antepartum   • Pre-existing essential hypertension complicating pregnancy, third trimester   • Dichorionic diamniotic twin pregnancy in third trimester   • Chronic hypertension with exacerbation during pregnancy in third trimester   • Diet controlled gestational diabetes mellitus (GDM) in third trimester   • Macrosomia of fetus affecting management of mother in third trimester   • Polyhydramnios in third trimester   • Maternal morbid obesity in third trimester, antepartum ([facility])   • Previous  delivery, antepartum   • Hypothyroid in pregnancy, antepartum, third trimester   • Decreased fetal movements in third trimester   • S/P  section   • Attention deficit hyperactivity disorder (ADHD)   • Essential hypertension   • History of pre-eclampsia   • Syncope   • DIETER (generalized anxiety disorder)   • PTSD (post-traumatic stress disorder)   • Unspecified mood (affective) disorder ([facility])   • Borderline personality disorder ([facility])       Past Medical History:  Past Medical History:   Diagnosis Date   • ADHD (attention deficit hyperactivity disorder)    • Anxiety    • Chronic hypertension     not issues currently, previously on labatelol   • Depression    • Dichorionic diamniotic twin pregnancy in second trimester 2022   • Gestational diabetes 2018   • Gestational hypertension    • Head injury     2014 Mild Concussion   • Hypothyroid in pregnancy, antepartum, third trimester 2022   • Insomnia    • PONV (postoperative nausea and vomiting)    • Preeclampsia    • PTSD (post-traumatic stress disorder) 2023   • Varicella      Past Medical History Pertinent Negatives:   Diagnosis Date Noted   • Abnormal ECG 12/10/2021   • Abnormal Pap smear of cervix 12/10/2021   • Alcoholism ([facility]) 12/10/2021   • Anemia 12/10/2021   • Asthma 12/10/2021   • Bipolar disorder ([facility])  12/10/2021   • Bleeding disorder (HCC) 12/10/2021   • Breast cancer (HCC) 12/10/2021   • Cancer (HCC) 12/10/2021   • Cervical cancer (HCC) 12/10/2021   • Cervical dysplasia 12/10/2021   • CHF (congestive heart failure) (HCC) 12/10/2021   • Chlamydia 12/10/2021   • Chronic kidney disease 12/10/2021   • Cirrhosis (HCC) 12/10/2021   • Clotting disorder (HCC) 12/10/2021   • Colorectal cancer (HCC) 12/10/2021   • COPD (chronic obstructive pulmonary disease) (HCC) 12/10/2021   • Coronary artery disease 12/10/2021   • Crohn's disease (HCC) 12/10/2021   • Deep vein thrombosis (HCC) 12/10/2021   • Ectopic pregnancy 12/10/2021   • Endometriosis 12/10/2021   • Female infertility 12/10/2021   • Fibroid 12/10/2021   • GBS (group B streptococcus) infection 12/10/2021   • Gonorrhea 12/10/2021   • Gout 12/10/2021   • Hard to intubate 12/10/2021   • Hepatitis B 12/10/2021   • Hepatitis C 12/10/2021   • Herpes 12/10/2021   • History of transfusion 12/10/2021   • HIV disease (HCC) 12/10/2021   • HPV (human papilloma virus) infection 12/10/2021   • Hyperemesis gravidarum 12/10/2021   • Hyperlipidemia 12/10/2021   • Infectious viral hepatitis 12/10/2021   • Kidney stone 12/10/2021   • Liver disease 12/10/2021   • Lupus (HCC) 12/10/2021   • Malignant hyperthermia due to anesthesia 12/10/2021   • Migraine 12/10/2021   • Myocardial infarction (HCC) 12/10/2021   • Osteoarthritis 12/10/2021   • Osteopenia 12/10/2021   • Osteoporosis 12/10/2021   • Ovarian cancer (HCC) 12/10/2021   • Ovarian cyst 12/10/2021   • Pelvic prolapse 12/10/2021   • PID (pelvic inflammatory disease) 12/10/2021   • Placenta accreta 12/10/2021   • Placenta previa 12/10/2021   • PMS (premenstrual syndrome) 12/10/2021   • Polycystic ovary syndrome 12/10/2021   • Pulmonary arterial hypertension (HCC) 12/10/2021   • Pulmonary embolism (HCC) 12/10/2021   • Recurrent pregnancy loss, antepartum condition or complication 12/10/2021   • Rh incompatibility 12/10/2021   •  Seizures ([facility]) 12/10/2021   • Sickle cell anemia ([facility]) 12/10/2021   • Spinal headache 12/10/2021   • Stroke ([facility]) 12/10/2021   • Substance abuse ([facility]) 12/10/2021   • Syphilis 12/10/2021   • TIA (transient ischemic attack) 12/10/2021   • Tuberculosis 12/10/2021   • Ulcerative colitis ([facility]) 12/10/2021   • Urinary tract infection 12/10/2021   • Urogenital trichomoniasis 12/10/2021   • Uterine cancer ([facility]) 12/10/2021   • Vaginal cancer ([facility]) 12/10/2021   • Vulvar cancer ([facility]) 12/10/2021       Allergy:   Allergies   Allergen Reactions   • Buspirone Swelling and Other (See Comments)     SERVE SWELLING IN THE FACE PER PT  Heart palpitations   • Lamotrigine Rash and Hives   • Norelgestromin-Eth Estradiol Swelling     Birth control patch adhesive per pt   • Oxcarbazepine Rash   • Amphetamine-Dextroamphetamine Palpitations   • Dicyclomine Rash   • Metoclopramide Irritability and Other (See Comments)     Causes agitation and aggression        Current Medications:   Current Outpatient Medications   Medication Sig Dispense Refill   • escitalopram (Lexapro) 10 MG tablet Take 2 tablets by mouth Daily. 60 tablet 0   • famotidine (Pepcid) 20 MG tablet 20 mg.     • ibuprofen (ADVIL,MOTRIN) 800 MG tablet Take 1 tablet by mouth Every 8 (Eight) Hours As Needed for Mild Pain. 60 tablet 3   • omeprazole (priLOSEC) 40 MG capsule 40 mg.     • ondansetron (ZOFRAN) 4 MG tablet Take 4 mg by mouth.     • promethazine (PHENERGAN) 25 MG tablet Take 25 mg by mouth Every 6 (Six) Hours As Needed.       No current facility-administered medications for this visit.     Mental Status Exam:   Hygiene:   good  Cooperation:  Cooperative  Eye Contact:  Good  Psychomotor Behavior:  Appropriate  Affect:  Appropriate  Mood: normal  Hopelessness: Denies  Speech:  Normal  Thought Process:  Goal directed  Thought Content:  Normal  Suicidal:  None  Homicidal:  None  Hallucinations:  None  Delusion:  None  Memory:  Intact  Orientation:  Person, Place, Time and  Situation  Reliability:  fair  Insight:  Fair  Judgement:  Fair  Impulse Control:  Fair  Physical/Medical Issues:  Yes reviewed     Review of Systems:   History obtained from chart review and the patient  General ROS: negative  Psychological ROS: + depression + anxiety + ADHD + sleep issues  Respiratory ROS: negative  Cardiovascular ROS: negative  Neurological ROS: negative    Physical Exam  Constitutional:       Appearance: Normal appearance, appears in no acute distress  Cardiovascular:      Comments: No chest pain reported  Pulmonary:      Effort: Pulmonary effort is normal.      Comments: No shortness of breath reported  Musculoskeletal:      Comments: No recent falls/injury reported   Neurological:      Mental Status: He/she is alert and oriented to person, place, and time.   Psychiatric:         Thought Content: Thought content normal.         Judgment: Judgment normal.     Vital Signs:   There were no vitals taken for this visit.  There is no height or weight on file to calculate BMI.    Lab Results: .de  Office Visit on 01/27/2023   Component Date Value Ref Range Status   • Chlamydia trachomatis, BRYAN 01/27/2023 Negative  Negative Final   • Gonococcus by BRYAN 01/27/2023 Negative  Negative Final   • Trichomonas vaginosis 01/27/2023 Negative  Negative Final   • Urine Culture 01/27/2023 Final report   Final   • Result 1 01/27/2023 Comment   Final    Comment: Culture shows less than 10,000 colony forming units of bacteria per  milliliter of urine. This colony count is not generally considered  to be clinically significant.     Postpartum Visit on 09/23/2022   Component Date Value Ref Range Status   • HCG, Urine, QL 09/23/2022 Negative  Negative Final   • Lot Number 09/23/2022 [id number]   Final   • Internal Positive Control 09/23/2022 Positive  Positive, Passed Final   • Internal Negative Control 09/23/2022 Negative  Negative, Passed Final   • Expiration Date 09/23/2022 10/31/2023   Final       Assessment & Plan    Problems Addressed this Visit        Psychiatry Problems    Attention deficit hyperactivity disorder (ADHD)    DIETER (generalized anxiety disorder)    Unspecified mood (affective) disorder (HCC) - Primary    Borderline personality disorder (HCC)   Diagnoses       Codes Comments    Moderate episode of recurrent major depressive disorder (HCC)    -  Primary ICD-10-CM: F33.1  ICD-9-CM: 296.32     ADHD (attention deficit hyperactivity disorder), inattentive type     ICD-10-CM: F90.0  ICD-9-CM: 314.00     DIETER (generalized anxiety disorder)     ICD-10-CM: F41.1  ICD-9-CM: 300.02     Borderline personality disorder (HCC)     ICD-10-CM: F60.3  ICD-9-CM: 301.83           TREATMENT PLAN/GOALS: Continue supportive psychotherapy efforts and medications as indicated. Treatment and medication options discussed during today's visit. Patient acknowledged and verbally consented to continue with current treatment plan and was educated on the importance of compliance with treatment and follow-up appointments.    Barriers: Stress, Other: Multiple psychiatric comorbidities, Side effects of medication and Medications no longer work   Strengths: assertive and courageous    Short-Term Goals: Patient will be compliant with medication management and note improvement in symptoms over the next 4 to 6 weeks or at next scheduled visit.  Long-Term Goals: Patient will continue psychotherapy as well as medication regimen without impairment in daily functioning over the next 6 months.      Progress toward goal: Not at goal  Functional Status: Moderate impairment   Prognosis: Fair with Ongoing Treatment     PLAN OF CARE: 02/03/2023  1. Patient was seen for depression, anxiety, ADHD, personality disorder and side effects to medications moderate intensity. PHQ/DIETER scores: 12/18  2. Above listed condition/conditions are unchanged.  Mild improvement in depression/irritability only.  3. Current psychiatric medications: Lexapro 20 mg/day. Medication  Changes: Discontinue Risperdal due to side effect, nightmares, no other medication changes at this time.  4. Follow Up with Hua: 2-3 weeks tentatively  5. Neuropsychiatric evaluation requested/recommended, resources provided to patient  6. ADHD/stimulant protocol: Will need UDS, narcotics contract, VS check, SARIAH signed to request records from [facility] in [facility] and a Bassem report prior to initiating medication.  We will have medical assistant reach out to you Monday to schedule for a day next week.  7. Patient reports has history of taking Focalin 10 mg twice a day stopped due to pregnancy last year.  Patient denies history of alcohol/substance abuse.  Medication was being prescribed by physician at [facility] in [facility] prior, no records to review.    PSYCHOEDUCATION:  • A thorough discussion was had that included review of disease process, need for continued monitoring and additional treatment options including use of pharmacological and non-pharmacological approaches to care, decisions were made and agreed upon by patient and provider.   • Discussed the risks, benefits, and potential side effects of the medications; patient ackowledged and verbally consented.   • Patient is advised to avoid driving or operating heavy machinery if they feel drowsy or over sedated.   • Patient is agreeable to call the office with any worsening of symptoms or onset of intolerable side effects. Patient is agreeable to call 911 or go to the nearest ER should he/she begin having SI/HI.     BASSEM reviewed 02/03/2023    MEDS ORDERED DURING VISIT:  No orders of the defined types were placed in this encounter.      Part of this note may be an electronic transcription/translation of spoken language to printed text using the Dragon Dictation System.    A total of 30 minutes was spent caring for this patient. That time was spent reviewing past notes, updating patient information, assessing patient for S/S of  condition being treated, educating patient on different treatment options, placing orders, and documenting in the record.

## 2023-02-03 NOTE — TELEPHONE ENCOUNTER
Galilea,     Ultrasound today for BTB with Nexplanon, pain   Normal ultrasound. No mass or other concerning finding.   Please let her know    Thanks,   Dr. Asher

## 2023-02-03 NOTE — PATIENT INSTRUCTIONS
PLAN OF CARE: 02/03/2023  Patient was seen for depression, anxiety, ADHD, personality disorder and side effects to medications moderate intensity. PHQ/DIETER scores: 12/18  Above listed condition/conditions are unchanged.  Mild improvement in depression/irritability only.  Current psychiatric medications: Lexapro 20 mg/day. Medication Changes: Discontinue Risperdal due to side effect, nightmares, no other medication changes at this time.  Follow Up with Hua: 2-3 weeks tentatively  Neuropsychiatric evaluation requested/recommended, resources provided to patient  ADHD/stimulant protocol: Will need UDS, narcotics contract, VS check, SARIAH signed to request records from [facility] in [facility] and a Ramon report prior to initiating medication.  We will have medical assistant reach out to you Monday to schedule for a day next week.  Patient reports has history of taking Focalin 10 mg twice a day stopped due to pregnancy last year.  Patient denies history of alcohol/substance abuse.  Medication was being prescribed by physician at [facility] in [facility] prior, no records to review.    General Instructions:  Patient to monitor for side effects, worsening symptoms, and/or improvement, report to PMHNP Hua immediatlenelsy.  If a sooner appointment is needed please call office at number listed below to schedule.  Please request refills through your pharmacy prior to reaching out to office or [facility]  Please give office staff (1) week to schedule a referral, if you have not heard anything around that time call office and ask to speak to outgoing referral .    Aron Cobos   Psychiatric Mental Health Nurse Practitioner (PMHNP)  [address] [place] [address]  P: [phone]  F: [phone]    Neuro-Psychological Evaluation:    [facility]  [address], [address]  [place] [address] [phone] Phone  [phone] Fax    [facility]  Office  [address] [id number] [phone]    [facility] [place] Office  [address] [id number]  Phone: [phone]  Fax: [phone]

## 2023-02-22 ENCOUNTER — APPOINTMENT (OUTPATIENT)
Dept: GENERAL RADIOLOGY | Facility: HOSPITAL | Age: 23
End: 2023-02-22
Payer: COMMERCIAL

## 2023-02-22 ENCOUNTER — HOSPITAL ENCOUNTER (EMERGENCY)
Facility: HOSPITAL | Age: 23
Discharge: HOME | End: 2023-02-22
Attending: EMERGENCY MEDICINE | Admitting: EMERGENCY MEDICINE
Payer: COMMERCIAL

## 2023-02-22 VITALS
OXYGEN SATURATION: 99 % | BODY MASS INDEX: 40.12 KG/M2 | WEIGHT: 235 LBS | TEMPERATURE: 99.5 F | RESPIRATION RATE: 15 BRPM | DIASTOLIC BLOOD PRESSURE: 88 MMHG | HEART RATE: 90 BPM | SYSTOLIC BLOOD PRESSURE: 127 MMHG | HEIGHT: 64 IN

## 2023-02-22 DIAGNOSIS — S80.11XA CONTUSION OF RIGHT LOWER EXTREMITY, INITIAL ENCOUNTER: ICD-10-CM
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
## 2023-02-22 DIAGNOSIS — S70.01XA CONTUSION OF RIGHT HIP, INITIAL ENCOUNTER: ICD-10-CM

## 2023-02-22 LAB — B-HCG UR QL: NEGATIVE

## 2023-02-22 PROCEDURE — 81025 URINE PREGNANCY TEST: CPT | Performed by: NURSE PRACTITIONER

## 2023-02-22 PROCEDURE — 73502 X-RAY EXAM HIP UNI 2-3 VIEWS: CPT

## 2023-02-22 PROCEDURE — 73552 X-RAY EXAM OF FEMUR 2/>: CPT

## 2023-02-22 PROCEDURE — 99283 EMERGENCY DEPT VISIT LOW MDM: CPT

## 2023-02-22 RX ORDER — IBUPROFEN 600 MG/1
600 TABLET ORAL EVERY 8 HOURS PRN
Qty: 12 TABLET | Refills: 0 | Status: SHIPPED | OUTPATIENT
Start: 2023-02-22

## 2023-02-22 NOTE — ED TRIAGE NOTES
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

## 2023-02-22 NOTE — ED PROVIDER NOTES
EMERGENCY DEPARTMENT ENCOUNTER    Room Number:  S04/04  Date seen:  2023  PCP: Sneha Cota APRN  Historian: self      HPI:  Chief Complaint: fall  A complete HPI/ROS/PMH/PSH/SH/FH are unobtainable due to: n/a  Context: Beth Gaviria is a 23 y.o. female who presents to the ED c/o falling through her porch due to rotted boards just prior to arrival.  She is complaining of right hip pain and right upper leg pain.  Pain worsens with weight bearing. She denies hitting her head.  No numbness or tingling.  No neck or back pain.  No other complaints at this time.  She states that she is currently 5 days late for her menstrual cycle and would like a pregnancy test prior to her x-rays.            PAST MEDICAL HISTORY  Active Ambulatory Problems     Diagnosis Date Noted   • Anxiety 2020   • Depression 2020   • H/O gestational diabetes mellitus, not currently pregnant 2020   • Menorrhagia with regular cycle 2018   • Personality disorder (HCC) 2020   • Prediabetes 2020   • Seasonal allergies 2020   • Morbid obesity with BMI of 45.0-49.9, adult ([facility]) 2020   • Previous  delivery, antepartum 2022   • Pregnancy 2022   • Twin gestation, dichorionic/diamniotic (two placentae, two amniotic sacs) 2022   • Gestational diabetes mellitus (GDM) controlled on oral hypoglycemic drug, antepartum 2022   • Pre-existing essential hypertension complicating pregnancy, third trimester 2022   • Dichorionic diamniotic twin pregnancy in third trimester 2022   • Chronic hypertension with exacerbation during pregnancy in third trimester 2022   • Diet controlled gestational diabetes mellitus (GDM) in third trimester 2022   • Macrosomia of fetus affecting management of mother in third trimester 2022   • Polyhydramnios in third trimester 2022   • Maternal morbid obesity in third trimester, antepartum ([facility]) 2022   • Previous   delivery, antepartum 2022   • Hypothyroid in pregnancy, antepartum, third trimester 2022   • Decreased fetal movements in third trimester 2022   • S/P  section 2022   • Attention deficit hyperactivity disorder (ADHD) 2022   • Essential hypertension 2022   • History of pre-eclampsia 2022   • Syncope 2022   • DIETER (generalized anxiety disorder) 2023   • PTSD (post-traumatic stress disorder) 2023   • Unspecified mood (affective) disorder (HCC) 2023   • Borderline personality disorder (HCC) 2023     Resolved Ambulatory Problems     Diagnosis Date Noted   • Dichorionic diamniotic twin pregnancy in second trimester 2022     Past Medical History:   Diagnosis Date   • ADHD (attention deficit hyperactivity disorder)    • Chronic hypertension    • Gestational diabetes    • Gestational hypertension    • Head injury    • Insomnia    • PONV (postoperative nausea and vomiting)    • Preeclampsia    • Varicella          PAST SURGICAL HISTORY  Past Surgical History:   Procedure Laterality Date   •  SECTION     •  SECTION N/A 2022    Procedure:  SECTION REPEAT;  Surgeon: Luis Alberto Asher MD;  Location: [facility] LABOR DELIVERY;  Service: Obstetrics/Gynecology;  Laterality: N/A;   • CHOLECYSTECTOMY     • LAPAROSCOPIC CHOLECYSTECTOMY  2018   • TONSILLECTOMY     • WISDOM TOOTH EXTRACTION           FAMILY HISTORY  Family History   Problem Relation Age of Onset   • Diabetes Mother    • Heart disease Mother    • Hypertension Mother    • Coronary artery disease Mother    • Diabetes Father    • Cancer Father    • Hypertension Father    • Heart attack Father    • Cancer Maternal Grandmother         breast   • Breast cancer Maternal Grandmother    • Heart attack Paternal Grandmother    • Stroke Paternal Grandmother    • Deep vein thrombosis Maternal Uncle    • Ovarian cancer Neg Hx    • Uterine cancer Neg Hx     • Colon cancer Neg Hx    • Pulmonary embolism Neg Hx          SOCIAL HISTORY  Social History     Socioeconomic History   • Marital status: Single   • Number of children: 1   Tobacco Use   • Smoking status: Former     Packs/day: 0.25     Years: 2.00     Pack years: 0.50     Types: Electronic Cigarette, Cigarettes     Start date: 2016     Quit date: 10/31/2017     Years since quittin.3   • Smokeless tobacco: Never   Vaping Use   • Vaping Use: Former   • Quit date: 10/31/2017   Substance and Sexual Activity   • Alcohol use: Yes   • Drug use: Not Currently   • Sexual activity: Yes     Partners: Male     Birth control/protection: Nexplanon         ALLERGIES  Buspirone, Lamotrigine, Norelgestromin-eth estradiol, Oxcarbazepine, Amphetamine-dextroamphetamine, Dicyclomine, and Metoclopramide        REVIEW OF SYSTEMS  Per HPI, otherwise negative.       PHYSICAL EXAM  ED Triage Vitals [23 1724]   Temp Heart Rate Resp BP SpO2   99.5 °F (37.5 °C) 95 -- -- 99 %      Temp src Heart Rate Source Patient Position BP Location FiO2 (%)   Tympanic Monitor -- -- --       Physical Exam  Vitals and nursing note reviewed.   Constitutional:       Appearance: Normal appearance. She is obese.   Cardiovascular:      Rate and Rhythm: Normal rate and regular rhythm.      Pulses: Normal pulses.      Heart sounds: Normal heart sounds.   Pulmonary:      Effort: Pulmonary effort is normal.      Breath sounds: Normal breath sounds.   Chest:      Chest wall: No tenderness.   Abdominal:      General: Bowel sounds are normal.      Palpations: Abdomen is soft.      Tenderness: There is no abdominal tenderness. There is no guarding.   Musculoskeletal:         General: Normal range of motion.      Cervical back: Normal range of motion and neck supple. No tenderness.      Comments: No thoracic spine tenderness.    No midline lumbar spine tenderness, step-offs or deformities.    Right, posterior hip tenderness.  Full range of motion.  Pelvis  stable    Right, lateral thigh tenderness along the femur. Distal pulses palpable. Sensation intact to light touch.    Skin:     General: Skin is warm.      Capillary Refill: Capillary refill takes less than 2 seconds.   Neurological:      Mental Status: She is alert and oriented to person, place, and time. Mental status is at baseline.   Psychiatric:         Mood and Affect: Mood normal.               LAB RESULTS  Recent Results (from the past 24 hour(s))   Pregnancy, Urine - Urine, Clean Catch    Collection Time: 02/22/23  5:39 PM    Specimen: Urine, Clean Catch   Result Value Ref Range    HCG, Urine QL Negative Negative       Ordered the above labs and reviewed the results.        RADIOLOGY  XR Hip With or Without Pelvis 2 - 3 View Right, XR Femur 2 View Right    Result Date: 2/22/2023  XR FEMUR 2 VW RIGHT-, XR HIP W OR WO PELVIS 2-3 VIEW RIGHT-  02/22/2023  HISTORY: Fell. Pelvis right hip and right femur injuries.  4 images of the right femur, 2 views of the right hip and one view of the pelvis are submitted. No fractures or other acute bony abnormalities are seen.      1. No acute process identified in the pelvis, right hip and right femur.  This report was finalized on 2/22/2023 6:29 PM by Dr. Frederick Roy M.D.        Ordered the above noted radiological studies. Reviewed by me in PACS.              MEDICATIONS GIVEN IN ER  Medications - No data to display                MEDICAL DECISION MAKING, PROGRESS, and CONSULTS    All labs have been independently reviewed by me.  All radiology studies have been reviewed by me and I have also reviewed the radiology report.   EKG's independently viewed and interpreted by me.  Discussion below represents my analysis of pertinent findings related to patient's condition, differential diagnosis, treatment plan and final disposition.    Right sided pain after fall just prior to arrival  Imaging unremarkable    Additional sources:  - Discussed/ obtained information from  independent historians:  n/a    - External (non-ED) record review:   2/3/2023 tele medicine visit for depression, anxiety     -Chronic or social conditions impacting care: n/a    - Shared decision making:  Requested pregnancy test prior to xray       Orders placed during this visit:  Orders Placed This Encounter   Procedures   • XR Hip With or Without Pelvis 2 - 3 View Right   • XR Femur 2 View Right   • Pregnancy, Urine - Urine, Clean Catch         Additional orders considered but not ordered:  n/a        Differential diagnosis include, but not limited to:    Hip fracture, femur fracture, muscle strain      Independent interpretation of labs, radiology studies, and discussions with consultants:  ED Course as of 02/22/23 1952 Wed Feb 22, 2023 1817 X-ray of the right femur and hip interpreted by myself.  No fracture or dislocation. [TD]   1836 HCG, Urine QL: Negative [JG]   1837 I independently reviewed images from the right pelvis and right femur xrays on the PACS system and my interpretation is no acute fracture or dislocation . [JG]   1839 Updated patient on ED imaging and discharge instructions. All questions and concerns addressed. Stable for follow up.  [JG]      ED Course User Index  [JG] Zoë Waters APRN  [TD] Sean Hadley II, MD             DIAGNOSIS  Final diagnoses:   Fall, initial encounter   Contusion of right hip, initial encounter   Contusion of right lower extremity, initial encounter         DISPOSITION  discharge            Latest Documented Vital Signs:  As of 19:52 EST  BP- 127/88 HR- 90 Temp- 99.5 °F (37.5 °C) (Tympanic) O2 sat- 99%              --    Please note that portions of this were completed with a voice recognition program.       Note Disclaimer: At [facility], we believe that sharing information builds trust and better relationships. You are receiving this note because you are receiving care at [facility] or recently visited. It is possible you will see  health information before a provider has talked with you about it. This kind of information can be easy to misunderstand. To help you fully understand what it means for your health, we urge you to discuss this note with your provider.           Zoë Waters, APRN  02/22/23 1952

## 2023-02-22 NOTE — ED PROVIDER NOTES
MD ATTESTATION NOTE    The MYRTLE and I have discussed this patient's history, physical exam, and treatment plan.    I provided a substantive portion of the care of this patient. I personally performed the physical exam, in its entirety. The attached note describes my personal findings.      Beth Gaviria is a 23 y.o. female who presents to the ED c/o right hip and thigh pain after falling through her porch through rotted boards.      On exam:  GENERAL: not distressed  HENT: nares patent  EYES: no scleral icterus  CV: regular rhythm, regular rate, 2+ right PT pulse  RESPIRATORY: normal effort  ABDOMEN: soft  MUSCULOSKELETAL: no deformity, full range of motion to the right hip and knee  NEURO: alert, moves all extremities, follows commands  SKIN: Abrasion to the posterior right thigh    Labs  Recent Results (from the past 24 hour(s))   Pregnancy, Urine - Urine, Clean Catch    Collection Time: 02/22/23  5:39 PM    Specimen: Urine, Clean Catch   Result Value Ref Range    HCG, Urine QL Negative Negative       Radiology  XR Hip With or Without Pelvis 2 - 3 View Right, XR Femur 2 View Right    Result Date: 2/22/2023  XR FEMUR 2 VW RIGHT-, XR HIP W OR WO PELVIS 2-3 VIEW RIGHT-  02/22/2023  HISTORY: Fell. Pelvis right hip and right femur injuries.  4 images of the right femur, 2 views of the right hip and one view of the pelvis are submitted. No fractures or other acute bony abnormalities are seen.      1. No acute process identified in the pelvis, right hip and right femur.  This report was finalized on 2/22/2023 6:29 PM by Dr. Frederick Roy M.D.        Medications given in the ED:  Medications - No data to display    Orders placed during this visit:  Orders Placed This Encounter   Procedures   • XR Hip With or Without Pelvis 2 - 3 View Right   • XR Femur 2 View Right   • Pregnancy, Urine - Urine, Clean Catch       Medical Decision Making:  ED Course as of 02/23/23 2247 Wed Feb 22, 2023 1817 X-ray of the right femur and  hip interpreted by myself.  No fracture or dislocation. [TD]   1836 HCG, Urine QL: Negative [JG]   1837 I independently reviewed images from the right pelvis and right femur xrays on the PACS system and my interpretation is no acute fracture or dislocation . [JG]   1839 Updated patient on ED imaging and discharge instructions. All questions and concerns addressed. Stable for follow up.  [JG]      ED Course User Index  [JG] Zoë Waters APRN  [TD] Sean Hadley II, MD           PPE: Both the patient and I wore a surgical mask throughout the entire patient encounter.     Diagnosis  Final diagnoses:   Fall, initial encounter   Contusion of right hip, initial encounter   Contusion of right lower extremity, initial encounter        Sean Hadley II, MD  02/23/23 2592

## 2023-02-24 NOTE — PROGRESS NOTES
"Patient assessed today via MyChart through EPIC pt is at home in a secure environment and verbalizes privacy during interview. NIR Sequeira is at home in a secure environment using a secure laptop. The patient's condition being diagnosed/treated is appropriate for telemedicine. The provider identified himself as well as his credentials.   The patient, and/or patient's guardian, consent to be seen remotely, and when consent is given they understand that the consent allows for patient identifiable information to be sent to a third party as needed.   They may refuse to be seen remotely at any time. The electronic data is encrypted and password protected, and the patient and/or guardian has been advised of the potential risks to privacy not withstanding such measures.    Dinh Gaviria is a 23 y.o. female who presents today 02/27/2023     CC: Med management follow up for depression, anxiety, ADHD and personality disorder    HPI:  \"Im doing so so \" per pt.    Pt reports she has been over heating and not sweating due to the lexapro that was increased last appt, not as depressed although PHQ scores still relatively high, but reports is much more tolerable but is also wanting to try or is open to trying another medication. Pt reports she has been seeing a melissa melton weekly at [facility] for counseling, has seen twice. Need for genesight testing discussed due to multiple failed medications/side effects, reports it was done at age 14, doesn't have the results. Need for cardiac clearance prior to re--starting focalin discussed.    Since last appt pt reports improvement in S/S of depression. Pt endorses daily compliance with medications. Pt denies new medications, pt endorses new medical problems, injured leg with fall at home, was due to have CT scan of heart but had to post pone due to issues getting an IV Current S/S reviewed with pt, PHQ/DIETER scores reviewed with pt and are worse & improved. Sleep " disturbance is endorses and is described as generally restful sleep. Risk vs benefit of medications discussed including potential side effects, side effects to meds:over heating, reduced sweating.         PHQ-9 Depression Screening  Little interest or pleasure in doing things? (P) 3-->nearly every day   Feeling down, depressed, or hopeless? (P) 2-->more than half the days   Trouble falling or staying asleep, or sleeping too much? (P) 3-->nearly every day   Feeling tired or having little energy? (P) 3-->nearly every day   Poor appetite or overeating? (P) 3-->nearly every day   Feeling bad about yourself - or that you are a failure or have let yourself or your family down? (P) 3-->nearly every day   Trouble concentrating on things, such as reading the newspaper or watching television? (P) 1-->several days   Moving or speaking so slowly that other people could have noticed? Or the opposite - being so fidgety or restless that you have been moving around a lot more than usual? (P) 2-->more than half the days   Thoughts that you would be better off dead, or of hurting yourself in some way? (P) 0-->not at all   PHQ-9 Total Score (P) 20   If you checked off any problems, how difficult have these problems made it for you to do your work, take care of things at home, or get along with other people? (P) somewhat difficult     GAD7 DOCUMENTATION    Feeling nervous, anxious or on edge (P) 0   Not being able to stop or control worrying (P) 2   Worrying too much about different things (P) 3   Trouble relaxing (P) 3   Being so restless that it is hard to sit still (P) 2   Becoming easily annoyed or irritable (P) 3   Feeling Afraid as if something awful might happen (P) 0   DIETER Total Score (P) 13   If you checked any problems, how difficult have these problems made it for you to do your work, take care of things at home or get along with other people? (P) Somewhat difficult             Past Psychiatric History:  Began  Treatment:Ongoing since childhood  Diagnoses:Depression, anxiety, binge eating disorder, PTSD, ADHD, borderline personality disorder, self-harm, suicidality  Psychiatrist: Garo Nguyễn at [facility] last seen 2017  Therapist:Not currently, has not seen a therapist in over a year  Admission History:History of SI and SMB  Medication Trials: Multiple past meds reported, patient reports some she was on for several weeks others were stopped prematurely due to side effects.  Zoloft, Prozac worsened SI, Abilify, Seroquel aggressiveness, Lamictal rash, BuSpar agitation, Bentyl, amitriptyline SI.  Only medications that patient reports that have been effective in the past were Effexor that they stopped, patient cannot remember why, reports Focalin helps with ADHD stopped due to pregnancy.  Self Harm: History of cutting on arms, legs, stomach, last reported was 2 years ago  Suicide Attempts: Patient reports last thoughts of suicide was around 3 years ago, no plan or intent currently or current SI     Substance Abuse History:   Types:Denies all, including illicit  Withdrawal Symptoms:Denies  Longest Period Sober:Not Applicable   AA: Not applicable             Medical/surgical/social/family history reviewed and updated, problem list reviewed/updated, medications and allergies reviewed/updated.      Social History     Socioeconomic History   • Marital status: Single   • Number of children: 1   Tobacco Use   • Smoking status: Former     Packs/day: 0.25     Years: 2.00     Pack years: 0.50     Types: Electronic Cigarette, Cigarettes     Start date: 2016     Quit date: 10/31/2017     Years since quittin.3   • Smokeless tobacco: Never   Vaping Use   • Vaping Use: Former   • Quit date: 10/31/2017   Substance and Sexual Activity   • Alcohol use: Yes   • Drug use: Not Currently   • Sexual activity: Yes     Partners: Male     Birth control/protection: Nexplanon      Social History     Social History Narrative     Martial Status: Lives with significant other in [facility], patient has 3 children    Employed:No patient reports mental health does not allow them to work, 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    House:Patient was born in [place] but raised in [place], moved to [place] roughly 2 years ago for partner      Allergy:   Allergies   Allergen Reactions   • Buspirone Swelling and Other (See Comments)     SERVE SWELLING IN THE FACE PER PT  Heart palpitations   • Lamotrigine Rash and Hives   • Norelgestromin-Eth Estradiol Swelling     Birth control patch adhesive per pt   • Oxcarbazepine Rash   • Amphetamine-Dextroamphetamine Palpitations   • Dicyclomine Rash   • Metoclopramide Irritability and Other (See Comments)     Causes agitation and aggression      Current Medications:   Current Outpatient Medications   Medication Sig Dispense Refill   • ibuprofen (ADVIL,MOTRIN) 600 MG tablet Take 1 tablet by mouth Every 8 (Eight) Hours As Needed for Mild Pain. 12 tablet 0   • omeprazole (priLOSEC) 40 MG capsule 40 mg.     • ondansetron (ZOFRAN) 4 MG tablet Take 4 mg by mouth.     • Vortioxetine HBr (Trintellix) 5 MG tablet tablet Take 1 tablet by mouth Daily With Breakfast. 30 tablet 0     No current facility-administered medications for this visit.     Review of Systems   Constitutional: Negative.    Respiratory: Negative.  Negative for chest tightness and shortness of breath.    Cardiovascular: Negative for chest pain.   Neurological: Negative.  Negative for dizziness and light-headedness.   Psychiatric/Behavioral: Positive for + depression/anxiety/ADHD     Objective   Physical Exam  Constitutional:       Appearance: Normal appearance, appears in no acute distress  Pulmonary:      Effort: Pulmonary effort is normal.      Comments: No shortness of breath reported  Musculoskeletal:      Comments: No recent falls/injury reported   Neurological:      Mental Status: He/she is alert and oriented to person,  place, and time.   Psychiatric:         Thought Content: Thought content normal.         Judgment: Judgment normal.     Mental Status Exam:   Hygiene:   good  Cooperation:  Cooperative  Eye Contact:  Good  Psychomotor Behavior:  Appropriate  Affect:  Appropriate  Mood: appropriate  Speech:  Normal  Thought Process:  Goal directed  Thought Content:  Normal  Suicidal:  None  Homicidal:  None  Hallucinations:  None  Delusion:  None  Memory:  Intact  Reliability:  fair  Insight:  Fair  Judgement:  Fair  Impulse Control:  Fair    Vital Signs:   There were no vitals taken for this visit.   No LMP recorded.   There is no height or weight on file to calculate BMI.     Wt Readings from Last 5 Encounters:   02/22/23 107 kg (235 lb)   01/27/23 107 kg (235 lb)   01/05/23 109 kg (240 lb)   11/18/22 111 kg (244 lb)   09/23/22 105 kg (231 lb)     BP Readings from Last 5 Encounters:   02/22/23 127/88   01/27/23 136/93   01/05/23 122/82   11/18/22 116/86   09/23/22 118/84     Pulse Readings from Last 5 Encounters:   02/22/23 90   01/27/23 86   01/05/23 81   11/18/22 86   09/23/22 51     Lab Results:  Lab Results   Component Value Date     07/01/2022    BUN 5 (L) 07/01/2022    CREATININE 0.70 07/01/2022    TSH 5.250 (H) 07/01/2022    WBC 14.12 (H) 07/03/2022     CBC    CBC 5/30/22 7/1/22 7/3/22   WBC 10.44 10.98 (A) 14.12 (A)   RBC 4.42 4.67 3.33 (A)   Hemoglobin 11.4 (A) 11.7 (A) 8.5 (A)   Hematocrit 35.9 36.9 25.7 (A)   MCV 81.2 79.0 77.2 (A)   MCH 25.8 (A) 25.1 (A) 25.5 (A)   MCHC 31.8 31.7 33.1   RDW 15.5 (A) 16.0 (A) 15.5 (A)   Platelets 312 284 258   (A) Abnormal value              A1C Last 3 Results    HGBA1C Last 3 Results 7/1/22   Hemoglobin A1C 6.00 (A)   (A) Abnormal value            Last Urine Toxicity     LAST URINE TOXICITY RESULTS Latest Ref Rng & Units 2/23/2023 7/1/2022    CREATININE UR 20.0 - 300.0 mg/dL 20.7 289.0    AMPHETAMINES SCREEN, URINE Jjhuzn=3328 ng/mL Negative -    BARBITURATES SCREEN Lrvtzv=957  ng/mL Negative -    BENZODIAZEPINE SCREEN, URINE Otavzy=435 ng/mL Negative -    BUPRENORPHINEUR Absent - -    COCAINE SCREEN, URINE Zqvxyd=450 ng/mL Negative -    METHADONE SCREEN, URINE Evbbwa=732 ng/mL Negative -        EKG Results:  No orders to display     Imaging Results:  XR Femur 2 View Right    Result Date: 2/22/2023  1. No acute process identified in the pelvis, right hip and right femur.  This report was finalized on 2/22/2023 6:29 PM by Dr. Frederick Roy M.D.      XR Hip With or Without Pelvis 2 - 3 View Right    Result Date: 2/22/2023  1. No acute process identified in the pelvis, right hip and right femur.  This report was finalized on 2/22/2023 6:29 PM by Dr. Frederick Roy M.D.      Assessment & Plan   Problems Addressed this Visit        Psychiatry Problems    Personality disorder (HCC)    Relevant Medications    Vortioxetine HBr (Trintellix) 5 MG tablet tablet    Attention deficit hyperactivity disorder (ADHD)    Relevant Medications    Vortioxetine HBr (Trintellix) 5 MG tablet tablet    DIETER (generalized anxiety disorder)    Relevant Medications    Vortioxetine HBr (Trintellix) 5 MG tablet tablet    Unspecified mood (affective) disorder (HCC) - Primary    Relevant Medications    Vortioxetine HBr (Trintellix) 5 MG tablet tablet    Borderline personality disorder (HCC)    Relevant Medications    Vortioxetine HBr (Trintellix) 5 MG tablet tablet   Diagnoses       Codes Comments    Unspecified mood (affective) disorder (HCC)    -  Primary ICD-10-CM: F39  ICD-9-CM: 296.90     DIETER (generalized anxiety disorder)     ICD-10-CM: F41.1  ICD-9-CM: 300.02     Borderline personality disorder (HCC)     ICD-10-CM: F60.3  ICD-9-CM: 301.83     Attention deficit hyperactivity disorder (ADHD), unspecified ADHD type     ICD-10-CM: F90.9  ICD-9-CM: 314.01     Personality disorder (HCC)     ICD-10-CM: F60.9  ICD-9-CM: 301.9         Plan of Care:  1. Prior psychiatry note, pt history, review of systems, medications,  allergies, reviewed, patient was screened today for depression/anxiety, PHQ/DIETER scores reviewed. Physical & mental status exam today is unremarkable/at baseline. Most recent vitals/labs reviewed.  2. Above listed condition/conditions are improving.moderate intensity.   3. Pt was given appropriate time to ask questions and concerns were addressed. A thorough discussion was had that included review of disease process, need for continued monitoring and additional treatment options including use of pharmacological and non-pharmacological approaches to care, decisions were made and agreed upon by patient and provider.   4. Discussed the risks, benefits, and potential side effects of the medications; patient ackowledged and verbally consented. Patient is advised to avoid driving or operating heavy machinery if they feel drowsy or over sedated.   5. Patient is agreeable to call the office with any worsening of symptoms or onset of intolerable side effects. Patient is agreeable to call 911 or go to the nearest ER should he/she begin having SI/HI.   6. Current psychiatric medications: lexapro 20 mg/day. Medication Changes today: Yes, cut lexapro back to 10 mg/day due to side effects, trintellix 5 mg/day ordered, will most likely require a prior authorization, don't stop lexapro until new med is filled.  7. Continue counseling weekly with melissa melton  8. Reach out to cardiologist for clearance for focalin due to heart history, pt reports POTS is suspected    Return in about 1 month (around 3/27/2023) for Medication Check, Video visit.    Medications Issues:  BASSEM reviewed 02/27/2023 and is appropriate.    Medications Discontinued During This Encounter   Medication Reason   • promethazine (PHENERGAN) 25 MG tablet Historical Med - Therapy completed   • famotidine (Pepcid) 20 MG tablet Historical Med - Therapy completed   • escitalopram (Lexapro) 10 MG tablet *Therapy completed     New Medications Ordered This Visit    Medications   • Vortioxetine HBr (Trintellix) 5 MG tablet tablet     Sig: Take 1 tablet by mouth Daily With Breakfast.     Dispense:  30 tablet     Refill:  0       Barriers: Side effects of medication and Medications no longer work   Strengths: resourceful    Progress toward goal: Not at goal  Functional Status: Moderate impairment   Prognosis: Fair with Ongoing Treatment     No show policy:  We understand unexpected circumstances arise; however, anytime you miss your appointment we are unable to provide you appropriate care.  In addition, each appointment missed could have been used to provide care for others.  We ask that you call at least 24 hours in advance to cancel or reschedule an appointment. We would like to take this opportunity to remind you of our policy stating patients who miss THREE or more appointments without cancelling or rescheduling 24 hours in advance of the appointment may be subject to cancellation of any further visits with our clinic and recommendation to seek in-person services/visits. Please call [phone] to reschedule your appointment. If there are reasons that make it difficult for you to keep the appointments, please call and let us know how we can help. Please understand that medication prescribing will not continue without seeing your provider.      This document has been electronically signed by JUAN Marroquin  February 27, 2023 16:01 EST    A total of 30 minutes was spent caring for this patient. That time was spent reviewing past notes, updating patient information, assessing patient for S/S of condition being treated, educating patient on different treatment options, placing orders, and documenting in the record.    Part of this note may be an electronic transcription/translation of spoken language to printed text using the Dragon Dictation System. Some of the data in this electronic note has been brought forward from a previous encounter, any necessary changes have  been made, it has been reviewed by this APRN, and it is accurate.

## 2023-02-27 ENCOUNTER — TELEMEDICINE (OUTPATIENT)
Dept: BEHAVIORAL HEALTH | Facility: CLINIC | Age: 23
End: 2023-02-27
Payer: COMMERCIAL

## 2023-02-27 DIAGNOSIS — F90.9 ATTENTION DEFICIT HYPERACTIVITY DISORDER (ADHD), UNSPECIFIED ADHD TYPE: ICD-10-CM

## 2023-02-27 DIAGNOSIS — F41.1 GAD (GENERALIZED ANXIETY DISORDER): ICD-10-CM

## 2023-02-27 DIAGNOSIS — F39 UNSPECIFIED MOOD (AFFECTIVE) DISORDER: Primary | ICD-10-CM

## 2023-02-27 DIAGNOSIS — F60.9 PERSONALITY DISORDER: ICD-10-CM

## 2023-02-27 DIAGNOSIS — F60.3 BORDERLINE PERSONALITY DISORDER: ICD-10-CM

## 2023-02-27 PROCEDURE — 99214 OFFICE O/P EST MOD 30 MIN: CPT

## 2023-03-01 ENCOUNTER — HOSPITAL ENCOUNTER (EMERGENCY)
Facility: HOSPITAL | Age: 23
Discharge: HOME | End: 2023-03-01
Attending: EMERGENCY MEDICINE | Admitting: EMERGENCY MEDICINE
Payer: COMMERCIAL

## 2023-03-01 ENCOUNTER — APPOINTMENT (OUTPATIENT)
Dept: CT IMAGING | Facility: HOSPITAL | Age: 23
End: 2023-03-01
Payer: COMMERCIAL

## 2023-03-01 VITALS
HEART RATE: 111 BPM | OXYGEN SATURATION: 100 % | DIASTOLIC BLOOD PRESSURE: 84 MMHG | RESPIRATION RATE: 17 BRPM | WEIGHT: 252 LBS | BODY MASS INDEX: 43.02 KG/M2 | TEMPERATURE: 98.6 F | HEIGHT: 64 IN | SYSTOLIC BLOOD PRESSURE: 131 MMHG

## 2023-03-01 DIAGNOSIS — R55 SYNCOPE AND COLLAPSE: Primary | ICD-10-CM

## 2023-03-01 LAB
AMPHET+METHAMPHET UR QL: NEGATIVE
AMPHETAMINES UR QL: NEGATIVE
B-HCG UR QL: NEGATIVE
BARBITURATES UR QL SCN: NEGATIVE
BENZODIAZ UR QL SCN: NEGATIVE
BUPRENORPHINE SERPL-MCNC: NEGATIVE NG/ML
CANNABINOIDS SERPL QL: NEGATIVE
COCAINE UR QL: NEGATIVE
METHADONE UR QL SCN: NEGATIVE
OPIATES UR QL: NEGATIVE
OXYCODONE UR QL SCN: NEGATIVE
PCP UR QL SCN: NEGATIVE
PROPOXYPH UR QL: NEGATIVE
QT INTERVAL: 349 MS
TRICYCLICS UR QL SCN: NEGATIVE

## 2023-03-01 PROCEDURE — 93010 ELECTROCARDIOGRAM REPORT: CPT | Performed by: INTERNAL MEDICINE

## 2023-03-01 PROCEDURE — 70450 CT HEAD/BRAIN W/O DYE: CPT

## 2023-03-01 PROCEDURE — 93005 ELECTROCARDIOGRAM TRACING: CPT | Performed by: EMERGENCY MEDICINE

## 2023-03-01 PROCEDURE — 93005 ELECTROCARDIOGRAM TRACING: CPT

## 2023-03-01 PROCEDURE — 99283 EMERGENCY DEPT VISIT LOW MDM: CPT

## 2023-03-01 PROCEDURE — 81025 URINE PREGNANCY TEST: CPT | Performed by: EMERGENCY MEDICINE

## 2023-03-01 PROCEDURE — 80306 DRUG TEST PRSMV INSTRMNT: CPT | Performed by: EMERGENCY MEDICINE

## 2023-03-02 NOTE — DISCHARGE INSTRUCTIONS
Follow-up with your primary care provider after the Holter monitor.  Rest.  Drink plenty of fluids.  Return to the emergency department if there is further episodes of syncope, worse in any way at all.

## 2023-03-02 NOTE — ED NOTES
"Pt does not want an IV placed. This nurse explained the need for an IV to obtain labs and pt refused. Pt stated \" I do not want an IV, I pass out and throw up every time they blow and they blow every time.\"  "

## 2023-03-02 NOTE — ED PROVIDER NOTES
Subjective   History of Present Illness  History of Present Illness    Chief complaint: Syncope    Location: Home    Quality/Severity: Full syncopal episode    Timing/Onset/Duration: Prior to    Modifying Factors: Improved with    Associated Symptoms: No headache.  No fever chills or cough.  No sore throat earache or nasal congestion.  No shortness of breath.  Patient had some chest pain upon awakening.  No abdominal pain.  No nausea or vomiting.  No diarrhea.  No black or bloody stools.  No change in bladder or bowel function    Narrative: This 23-year-old white female with a history of syncopal episodes, presents with a history of syncopal episode while standing at the kitchen sink.  She had been standing about 15 minutes.  The patient states she has had episodes of passing out before when she has had blood drawn.  She is refusing any blood draws here in in the emergency department.  She denies illicit drug use.  Sneha Cota APRN  PCP:      Review of Systems   Constitutional: Negative for chills and fatigue.   HENT: Negative for congestion, ear pain and sore throat.    Respiratory: Negative for cough and shortness of breath.    Cardiovascular: Positive for chest pain.   Gastrointestinal: Negative for abdominal pain, diarrhea, nausea and vomiting.   Genitourinary: Negative for dysuria.   Musculoskeletal: Negative for back pain.   Skin: Negative for rash.   Neurological: Negative for weakness, numbness and headaches.   Psychiatric/Behavioral: Negative for confusion.        Medication List      ASK your doctor about these medications    ibuprofen 600 MG tablet  Commonly known as: ADVIL,MOTRIN  Take 1 tablet by mouth Every 8 (Eight) Hours As Needed for Mild Pain.     omeprazole 40 MG capsule  Commonly known as: priLOSEC     ondansetron 4 MG tablet  Commonly known as: ZOFRAN     Vortioxetine HBr 5 MG tablet tablet  Commonly known as: Trintellix  Take 1 tablet by mouth Daily With Breakfast.            Past  Medical History:   Diagnosis Date   • ADHD (attention deficit hyperactivity disorder)    • Anxiety    • Chronic hypertension     not issues currently, previously on labatelol   • Depression    • Dichorionic diamniotic twin pregnancy in second trimester 2022   • Gestational diabetes 2018   • Gestational hypertension    • Head injury     2014 Mild Concussion   • Hypothyroid in pregnancy, antepartum, third trimester 2022   • Insomnia    • PONV (postoperative nausea and vomiting)    • Preeclampsia    • PTSD (post-traumatic stress disorder) 2023   • Varicella        Allergies   Allergen Reactions   • Buspirone Swelling and Other (See Comments)     SERVE SWELLING IN THE FACE PER PT  Heart palpitations   • Lamotrigine Rash and Hives   • Norelgestromin-Eth Estradiol Swelling     Birth control patch adhesive per pt   • Oxcarbazepine Rash   • Amphetamine-Dextroamphetamine Palpitations   • Dicyclomine Rash   • Metoclopramide Irritability and Other (See Comments)     Causes agitation and aggression       Past Surgical History:   Procedure Laterality Date   •  SECTION     •  SECTION N/A 2022    Procedure:  SECTION REPEAT;  Surgeon: Luis Alberto Asher MD;  Location: [facility] DELIVERY;  Service: Obstetrics/Gynecology;  Laterality: N/A;   • CHOLECYSTECTOMY     • LAPAROSCOPIC CHOLECYSTECTOMY  2018   • TONSILLECTOMY     • WISDOM TOOTH EXTRACTION         Family History   Problem Relation Age of Onset   • Diabetes Mother    • Heart disease Mother    • Hypertension Mother    • Coronary artery disease Mother    • Diabetes Father    • Cancer Father    • Hypertension Father    • Heart attack Father    • Cancer Maternal Grandmother         breast   • Breast cancer Maternal Grandmother    • Heart attack Paternal Grandmother    • Stroke Paternal Grandmother    • Deep vein thrombosis Maternal Uncle    • Ovarian cancer Neg Hx    • Uterine cancer Neg Hx    • Colon cancer Neg Hx    •  Pulmonary embolism Neg Hx        Social History     Socioeconomic History   • Marital status: Single   • Number of children: 1   Tobacco Use   • Smoking status: Former     Packs/day: 0.25     Years: 2.00     Pack years: 0.50     Types: Electronic Cigarette, Cigarettes     Start date: 2016     Quit date: 10/31/2017     Years since quittin.3   • Smokeless tobacco: Never   Vaping Use   • Vaping Use: Former   • Quit date: 10/31/2017   Substance and Sexual Activity   • Alcohol use: Yes   • Drug use: Not Currently   • Sexual activity: Yes     Partners: Male     Birth control/protection: Nexplanon           Objective   Physical Exam  Vitals (The temperature is 98.6 °F, pulse 95, respirations 17, /94, room air pulse ox 100%) and nursing note reviewed.   HENT:      Head: Normocephalic and atraumatic.      Right Ear: Tympanic membrane normal.      Left Ear: Tympanic membrane normal.      Nose: Nose normal.      Mouth/Throat:      Mouth: Mucous membranes are moist.   Cardiovascular:      Rate and Rhythm: Normal rate and regular rhythm.      Pulses: Normal pulses.      Heart sounds: No murmur heard.    No friction rub. No gallop.   Pulmonary:      Effort: Pulmonary effort is normal.      Breath sounds: Normal breath sounds.   Abdominal:      General: Abdomen is flat. Bowel sounds are normal. There is no distension.      Palpations: Abdomen is soft. There is no mass.      Tenderness: There is no abdominal tenderness. There is no guarding or rebound.      Hernia: No hernia is present.   Musculoskeletal:         General: No swelling or tenderness. Normal range of motion.      Cervical back: Normal range of motion and neck supple.   Skin:     General: Skin is warm and dry.      Capillary Refill: Capillary refill takes less than 2 seconds.   Neurological:      General: No focal deficit present.      Mental Status: She is alert and oriented to person, place, and time.      Cranial Nerves: No cranial nerve deficit.       Sensory: No sensory deficit.      Motor: No weakness.      Coordination: Coordination normal.   Psychiatric:         Mood and Affect: Mood normal.         Procedures           ED Course  ED Course as of 03/01/23 2158   Wed Mar 01, 2023   2156 The urine drug screen is negative.    The urine hCG is negative [RC]      ED Course User Index  [RC] Ken Ronquillo MD          21:09 EST, 03/01/23:  The EKG was obtained at 2033 and read by me at 2033.  The EKG shows a normal sinus rhythm with rate of 99.  There is a normal axis with no hypertrophy.  The IN, QRS, and QT intervals are unremarkable.  There is no ectopy.  There is no acute ST elevation or depression.     22:21 EST, 03/01/23:  The patient's diagnosis of syncope was discussed with her.  The patient should rest.  She should drink plenty of fluids.  She should follow-up with Sneha Cota after the Holter monitor.  She should return to the emergency department if there is further episodes of syncope, worse in any way at all                                MDM    Final diagnoses:   Syncope and collapse       ED Disposition  ED Disposition     None          No follow-up provider specified.       Medication List      No changes were made to your prescriptions during this visit.          Ken Ronquillo MD  03/01/23 6553

## 2023-03-03 ENCOUNTER — HOSPITAL ENCOUNTER (OUTPATIENT)
Dept: CARDIOLOGY | Facility: HOSPITAL | Age: 23
Discharge: HOME | End: 2023-03-03
Admitting: EMERGENCY MEDICINE
Payer: COMMERCIAL

## 2023-03-03 DIAGNOSIS — R55 SYNCOPE AND COLLAPSE: ICD-10-CM

## 2023-03-03 PROCEDURE — 93226 XTRNL ECG REC<48 HR SCAN A/R: CPT

## 2023-03-03 PROCEDURE — 93225 XTRNL ECG REC<48 HRS REC: CPT

## 2023-03-07 LAB
MAXIMAL PREDICTED HEART RATE: 195 BPM
STRESS TARGET HR: 166 BPM

## 2023-03-07 PROCEDURE — 93227 XTRNL ECG REC<48 HR R&I: CPT | Performed by: INTERNAL MEDICINE

## 2023-03-15 ENCOUNTER — TELEPHONE (OUTPATIENT)
Dept: FAMILY MEDICINE CLINIC | Facility: CLINIC | Age: 23
End: 2023-03-15
Payer: COMMERCIAL

## 2023-03-15 NOTE — TELEPHONE ENCOUNTER
Patient of  Aron Cobos, can not get Trintellix without a prior authorization, she is needing a refill called to [facility] [phone]

## 2023-03-17 NOTE — TELEPHONE ENCOUNTER
Attempted to do PA several times, CovermyMeds requiring me to check availability. Pt not able to be found in system. Called pharmacy to confirm insurance information and pharmacy did not give information but said that they would resend PA on covermymeds. No PA as of now

## 2023-03-28 ENCOUNTER — TELEPHONE (OUTPATIENT)
Dept: BEHAVIORAL HEALTH | Facility: CLINIC | Age: 23
End: 2023-03-28
Payer: COMMERCIAL

## 2023-03-28 NOTE — TELEPHONE ENCOUNTER
Patient asking that we get a PA for her Trintellix Rx to [facility]. She also said that we have sent them one but it had the wrong code. She can be reached at [phone] if questions.

## 2023-03-29 ENCOUNTER — TELEPHONE (OUTPATIENT)
Dept: FAMILY MEDICINE CLINIC | Facility: CLINIC | Age: 23
End: 2023-03-29
Payer: COMMERCIAL

## 2023-03-29 ENCOUNTER — PATIENT MESSAGE (OUTPATIENT)
Dept: FAMILY MEDICINE CLINIC | Facility: CLINIC | Age: 23
End: 2023-03-29
Payer: COMMERCIAL

## 2023-04-13 ENCOUNTER — TELEMEDICINE (OUTPATIENT)
Dept: FAMILY MEDICINE CLINIC | Facility: CLINIC | Age: 23
End: 2023-04-13
Payer: COMMERCIAL

## 2023-04-13 DIAGNOSIS — G47.9 SLEEP DIFFICULTIES: ICD-10-CM

## 2023-04-13 DIAGNOSIS — R06.83 SNORING: Primary | ICD-10-CM

## 2023-04-13 PROBLEM — K21.9 ACID REFLUX: Status: ACTIVE | Noted: 2023-04-13

## 2023-04-13 PROCEDURE — 1160F RVW MEDS BY RX/DR IN RCRD: CPT | Performed by: NURSE PRACTITIONER

## 2023-04-13 PROCEDURE — 1159F MED LIST DOCD IN RCRD: CPT | Performed by: NURSE PRACTITIONER

## 2023-04-13 PROCEDURE — 99213 OFFICE O/P EST LOW 20 MIN: CPT | Performed by: NURSE PRACTITIONER

## 2023-04-13 NOTE — PROGRESS NOTES
Subjective   Beth Gaviria is a 23 y.o. female.     History of Present Illness   Patient presents via video visit with c/o of insomnia. She reports that she's averaging 1-2 hours night. She states that she had a heart monitor recently and she states that her heart rate dropped very low while she was sleeping. She reports snoring as well.  She is requesting referral for sleep study. Patient is not taking anything currently and reports that she's taken herself off all of her psychiatric medications due to side affects.     The following portions of the patient's history were reviewed and updated as appropriate: allergies, current medications, past family history, past medical history, past social history, past surgical history and problem list.    Review of Systems   Constitutional: Negative for chills, fatigue and fever.   Respiratory: Negative for cough, chest tightness, shortness of breath and wheezing.    Cardiovascular: Negative for chest pain, palpitations and leg swelling.   Neurological: Negative for dizziness and headache.   Hematological: Negative.    Psychiatric/Behavioral: Positive for sleep disturbance. Negative for hallucinations and depressed mood. The patient is not nervous/anxious.        Objective   Physical Exam  Constitutional:       Appearance: She is well-developed.   HENT:      Head: Normocephalic and atraumatic.   Eyes:      Pupils: Pupils are equal, round, and reactive to light.   Pulmonary:      Effort: Pulmonary effort is normal.   Neurological:      Mental Status: She is alert and oriented to person, place, and time.   Psychiatric:         Behavior: Behavior normal.         Thought Content: Thought content normal.         Judgment: Judgment normal.         There were no vitals filed for this visit.  There is no height or weight on file to calculate BMI.      Procedures    Assessment & Plan   Problems Addressed this Visit    None  Visit Diagnoses     Snoring    -  Primary    Relevant Orders     Ambulatory Referral to Sleep Medicine    Sleep difficulties        Relevant Orders    Ambulatory Referral to Sleep Medicine      Diagnoses       Codes Comments    Snoring    -  Primary ICD-10-CM: R06.83  ICD-9-CM: 786.09     Sleep difficulties     ICD-10-CM: G47.9  ICD-9-CM: 780.50         You have chosen to receive care through a telehealth visit.  Do you consent to use a video/audio connection for your medical care today? Yes  Referral to sleep medicine         No follow-ups on file.

## 2023-04-13 NOTE — PROGRESS NOTES
"Patient assessed today via MyChart through EPIC pt is at home in a secure environment and verbalizes privacy during interview. NIR Sequeira is at home in a secure environment using a secure laptop. The patient's condition being diagnosed/treated is appropriate for telemedicine. The provider identified himself as well as his credentials.   The patient, and/or patient's guardian, consent to be seen remotely, and when consent is given they understand that the consent allows for patient identifiable information to be sent to a third party as needed.   They may refuse to be seen remotely at any time. The electronic data is encrypted and password protected, and the patient and/or guardian has been advised of the potential risks to privacy not withstanding such measures.     Dinh Gaviria is a 23 y.o. female who presents today 04/14/2023     CC: Med management follow up for depression, anxiety, ADHD and personality disorder    HPI:    Appt date: PHQ DIETER Med Changes/Issues/Summary:   1/5/23 INITIAL EVAL 1 15 Newly identified/Medication Changes today: Yes, OK to start Risperdal 0.25 mg at bedtime, increase lexapro to 20 mg, counseling (DBT) recommended/Follow up 1 month     Appt date: PHQ DIETER Med Changes/Issues/Summary:   2/3/23  12 18 Condition improving/worsening/Medication Changes today: Yes, OK to d/c risperdal due to side effects/Follow Up 2 weeks     Appt date: PHQ DIETER Med Changes/Issues/Summary:   2/27/23 20 13 Condition worsening/Medication Changes today: Yes, OK to cut lexapro dose to 10 mg/day, OK to switch to trintellix once filled.  Continue seeing Katy Sol [facility]/Follow up 1 month     Appt date: PHQ DIETER Med Changes/Issues/Summary:   04/14/23  20 15 Condition worsening/Medication Changes today: Yes, OK to re-start folcin xr 10 mg/day for ADHD/Follow Up 2 weeks     \"I'm not doing that great\" per pt.     Took self off the trintelix, made me more aggressive/irritable/violant, " "was on for a total of two weeks. Sleep is poor 2 hrs a night, main focus today appears to be ADHD, reports her therapist thinks it needs to be addressed and is contributing to depression and anxiety, patient reports she reached out to her cardiologist and he cleared her to restart stimulants, has history of cardiovascular issues although those symptoms are often times vague.  Last EKG reviewed, risk versus benefit of stimulants reviewed with patient.    Pt endorses a prior diagnosis of ADHD in childhood/adolescence , no supporting documentation showing prior diagnosis/treatment has been able to be reviewed , pt reports being treated previously with medication: Focalin, pt reports prior medication was helpful in treating S/S of ADHD and reported little to no side effects, pt reports last taking medication for ADHD in adulthood and pt interested in restarting medication for ADHD.    The patient endorses symptoms of ADHD including, but not limited to: 6 or more symptoms> 6 months of inattention lack of attn to details or careless mistakes diff sustained attention does not seem to listen does not follow instructions or finish school work or chores or duties in the work place difficulty organinzing tasks avoids and dislikes tasks that require sustained attn easily distracted by extraneous stimuli forgetful in daily activities Some ADHD symptoms present < 7 years of age Impairment present in 2 or > settings which have caused impairment in important areas of daily functioning.  The patient has had symptoms of ADHD since childhood, which have worsened over the last several years.      History of cardiovascular disease: endorses, history of chest pain, syncopy, seeing cardiologist regularly  Family history of cardiovascular disease: denies  History of Drug/ETOH abuse: pt denies history of or past tx of, pt reports minor/occasional use of ETOH, and \"pt reports minor use of caffeine    Since last appt pt reports no improvement " in S/S of depression, anxiety and ADHD. Pt denies daily compliance with medications. Pt denies new medications, pt denies new medical problems. Current S/S reviewed with pt, PHQ/DIETER scores reviewed with pt and are no improvement . Sleep disturbance is endorses and is described as difficulty falling asleep. Risk vs benefit of medications discussed including potential side effects, side effects to meds:yes listed above with trinteelix.  The patient would like to change their medications at this visit.       PHQ-9 Depression Screening  Little interest or pleasure in doing things? (P) 3-->nearly every day   Feeling down, depressed, or hopeless? (P) 3-->nearly every day   Trouble falling or staying asleep, or sleeping too much? (P) 3-->nearly every day   Feeling tired or having little energy? (P) 3-->nearly every day   Poor appetite or overeating? (P) 3-->nearly every day   Feeling bad about yourself - or that you are a failure or have let yourself or your family down? (P) 1-->several days   Trouble concentrating on things, such as reading the newspaper or watching television? (P) 1-->several days   Moving or speaking so slowly that other people could have noticed? Or the opposite - being so fidgety or restless that you have been moving around a lot more than usual? (P) 3-->nearly every day   Thoughts that you would be better off dead, or of hurting yourself in some way? (P) 0-->not at all   PHQ-9 Total Score (P) 20   If you checked off any problems, how difficult have these problems made it for you to do your work, take care of things at home, or get along with other people? (P) somewhat difficult     GAD7 DOCUMENTATION    Feeling nervous, anxious or on edge (P) 3   Not being able to stop or control worrying (P) 3   Worrying too much about different things (P) 2   Trouble relaxing (P) 2   Being so restless that it is hard to sit still (P) 1   Becoming easily annoyed or irritable (P) 3   Feeling Afraid as if something  awful might happen (P) 1   DIETER Total Score (P) 15   If you checked any problems, how difficult have these problems made it for you to do your work, take care of things at home or get along with other people? (P) Somewhat difficult          Med History:  · Zoloft  · Prozac, worsened SI  · Abilify  · Seroquel, aggressiveness  · Lamictal, rash  · BuSpar, agitation  · Amitriptyline, SI  · Effexor, helpful cannot remember why they stopped  · Lexapro 20 mg, lack of sweating and overheating reported, but only at 20 mg dose    Past Psychiatric History:  Began Treatment:Ongoing since childhood  Diagnoses:Depression, anxiety, binge eating disorder, PTSD, ADHD, borderline personality disorder, self-harm, suicidality  Psychiatrist: Garo Nguyễn at [facility] last seen 2017  Therapist:Not currently, has not seen a therapist in over a year  Admission History:History of SI and SMB  Self Harm: History of cutting on arms, legs, stomach, last reported was 2 years ago  Suicide Attempts: Patient reports last thoughts of suicide was around 3 years ago, no plan or intent currently or current SI       Medical/surgical/social/family history reviewed and updated, problem list reviewed/updated, medications and allergies reviewed/updated.      Social History     Socioeconomic History   • Marital status: Single   • Number of children: 1   Tobacco Use   • Smoking status: Former     Packs/day: 0.25     Years: 2.00     Pack years: 0.50     Types: Electronic Cigarette, Cigarettes     Start date: 2016     Quit date: 10/31/2017     Years since quittin.4   • Smokeless tobacco: Never   Vaping Use   • Vaping Use: Former   • Quit date: 10/31/2017   Substance and Sexual Activity   • Alcohol use: Yes   • Drug use: Not Currently   • Sexual activity: Yes     Partners: Male     Birth control/protection: Nexplanon      Social History     Social History Narrative    Martial Status: Lives with significant other in [facility],  patient has 3 children    Employed:No patient reports mental health does not allow them to work, 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    House:Patient was born in [place] but raised in [place], moved to [place] roughly 2 years ago for partner      Allergy:   Allergies   Allergen Reactions   • Buspirone Swelling and Other (See Comments)     SERVE SWELLING IN THE FACE PER PT  Heart palpitations   • Lamotrigine Rash and Hives   • Norelgestromin-Eth Estradiol Swelling     Birth control patch adhesive per pt   • Oxcarbazepine Rash   • Amphetamine-Dextroamphetamine Palpitations   • Dicyclomine Rash   • Metoclopramide Irritability and Other (See Comments)     Causes agitation and aggression      Current Medications:   Current Outpatient Medications   Medication Sig Dispense Refill   • dexmethylphenidate XR (Focalin XR) 10 MG 24 hr capsule Take 1 capsule by mouth Every Morning 14 capsule 0   • ibuprofen (ADVIL,MOTRIN) 600 MG tablet Take 1 tablet by mouth Every 8 (Eight) Hours As Needed for Mild Pain. 12 tablet 0     No current facility-administered medications for this visit.     Review of Systems   Constitutional: Negative.    Respiratory: Negative.  Negative for chest tightness and shortness of breath.    Cardiovascular: Negative for chest pain.   Neurological: Negative.  Negative for dizziness and light-headedness.   Psychiatric/Behavioral: Positive for + depression/anxiety/ADHD     Objective   Physical Exam  Constitutional:       Appearance: Normal appearance, appears in no acute distress  Pulmonary:      Effort: Pulmonary effort is normal.      Comments: No shortness of breath reported  Musculoskeletal:      Comments: No recent falls/injury reported   Neurological:      Mental Status: He/she is alert and oriented to person, place, and time.   Psychiatric:         Thought Content: Thought content normal.         Judgment: Judgment normal.     Mental Status Exam:   Hygiene:   good  Cooperation:   Cooperative  Eye Contact:  Good  Psychomotor Behavior:  Appropriate  Affect:  Appropriate  Mood: appropriate  Speech:  Normal  Thought Process:  Goal directed  Thought Content:  Normal  Suicidal:  None  Homicidal:  None  Hallucinations:  None  Delusion:  None  Memory:  Intact  Reliability:  fair  Insight:  Fair  Judgement:  Fair  Impulse Control:  Fair    Vital Signs:   There were no vitals taken for this visit.   No LMP recorded.   There is no height or weight on file to calculate BMI.     Wt Readings from Last 5 Encounters:   03/01/23 114 kg (252 lb)   02/22/23 107 kg (235 lb)   01/27/23 107 kg (235 lb)   01/05/23 109 kg (240 lb)   11/18/22 111 kg (244 lb)     BP Readings from Last 5 Encounters:   03/01/23 131/84   02/22/23 127/88   01/27/23 136/93   01/05/23 122/82   11/18/22 116/86     Pulse Readings from Last 5 Encounters:   03/01/23 111   02/22/23 90   01/27/23 86   01/05/23 81   11/18/22 86     Lab Results:  CMP        5/27/2022    12:53 5/30/2022    17:39 7/1/2022    17:49   CMP   Glucose 108   82   84     BUN 4   3   5     Creatinine 0.52   0.47   0.70     EGFR  136.5   124.0     Sodium 138   137   136     Potassium 4.8   4.2   4.1     Chloride 102   106   103     Calcium 9.3   8.7   8.6     Total Protein 6.9       Total Protein  6.3   6.2     Albumin 3.8   3.30   3.00     Globulin 3.1       Globulin  3.0   3.2     Total Bilirubin 0.4   0.4   0.4     Alkaline Phosphatase 126   114   158     AST (SGOT) 15   16   14     ALT (SGPT) 10   11   7     Albumin/Globulin Ratio  1.1   0.9     BUN/Creatinine Ratio 8   6.4   7.1     Anion Gap  12.0   15.0         CBC        5/30/2022    17:39 7/1/2022    17:49 7/3/2022    09:59   CBC   WBC 10.44   10.98   14.12     RBC 4.42   4.67   3.33     Hemoglobin 11.4   11.7   8.5     Hematocrit 35.9   36.9   25.7     MCV 81.2   79.0   77.2     MCH 25.8   25.1   25.5     MCHC 31.8   31.7   33.1     RDW 15.5   16.0   15.5     Platelets 312   284   258         A1C Last 3 Results         7/1/2022    17:49   HGBA1C Last 3 Results   Hemoglobin A1C 6.00       Last Urine Toxicity         Latest Ref Rng & Units 3/1/2023 2/23/2023   LAST URINE TOXICITY RESULTS   Creatinine, Urine 20.0 - 300.0 mg/dL  20.7     Amphetamine, Urine Qual Negative Negative   Negative     Barbiturates Screen, Urine Negative Negative   Negative     Benzodiazepine Screen, Urine Negative Negative   Negative     Buprenorphine, Screen, Urine Negative Negative      Cocaine Screen, Urine Negative Negative   Negative     Methadone Screen , Urine Negative Negative   Negative     Methamphetamine, Ur Negative Negative            Multiple values from one day are sorted in reverse-chronological order           EKG Results:  Status: (Other)      HEART RATE= 99  bpm  RR Interval= 604  ms  OK Interval= 149  ms  P Horizontal Axis= -6  deg  P Front Axis= 77  deg  QRSD Interval= 88  ms  QT Interval= 349  ms  QRS Axis= 45  deg  T Wave Axis= 23  deg  - NORMAL ECG -  Sinus rhythm  No change from prior tracing  Electronically Signed By: Geronimo Betts ([facility]) 01-Mar-2023 21:29:46  Date and Time of Study: 2023-03-01 20:32:29        Imaging Results:  XR Femur 2 View Right    Result Date: 2/22/2023  1. No acute process identified in the pelvis, right hip and right femur.  This report was finalized on 2/22/2023 6:29 PM by Dr. Frederick Roy M.D.      XR Hip With or Without Pelvis 2 - 3 View Right    Result Date: 2/22/2023  1. No acute process identified in the pelvis, right hip and right femur.  This report was finalized on 2/22/2023 6:29 PM by Dr. Frederick Roy M.D.      Assessment & Plan   Problems Addressed this Visit        Psychiatry Problems    DIETER (generalized anxiety disorder)    Relevant Medications    dexmethylphenidate XR (Focalin XR) 10 MG 24 hr capsule    Unspecified mood (affective) disorder    Relevant Medications    dexmethylphenidate XR (Focalin XR) 10 MG 24 hr capsule    Borderline personality disorder    Relevant Medications     dexmethylphenidate XR (Focalin XR) 10 MG 24 hr capsule   Other Visit Diagnoses     ADHD (attention deficit hyperactivity disorder), inattentive type    -  Primary    Relevant Medications    dexmethylphenidate XR (Focalin XR) 10 MG 24 hr capsule      Diagnoses       Codes Comments    ADHD (attention deficit hyperactivity disorder), inattentive type    -  Primary ICD-10-CM: F90.0  ICD-9-CM: 314.00     Unspecified mood (affective) disorder     ICD-10-CM: F39  ICD-9-CM: 296.90     DIETER (generalized anxiety disorder)     ICD-10-CM: F41.1  ICD-9-CM: 300.02     Borderline personality disorder     ICD-10-CM: F60.3  ICD-9-CM: 301.83         Plan of Care:  1. Prior psychiatry note, pt history, review of systems, medications, allergies, reviewed, patient was screened today for depression/anxiety, PHQ/DIETER scores reviewed. Physical & mental status exam today is unremarkable/at baseline. Most recent vitals/labs reviewed.  2. Above listed condition/conditions are improving.moderate intensity.   3. Pt was given appropriate time to ask questions and concerns were addressed. A thorough discussion was had that included review of disease process, need for continued monitoring and additional treatment options including use of pharmacological and non-pharmacological approaches to care, decisions were made and agreed upon by patient and provider.   4. Discussed the risks, benefits, and potential side effects of the medications; patient ackowledged and verbally consented. Patient is advised to avoid driving or operating heavy machinery if they feel drowsy or over sedated.   5. Patient is agreeable to call the office with any worsening of symptoms or onset of intolerable side effects. Patient is agreeable to call 911 or go to the nearest ER should he/she begin having SI/HI.   6. Medication changes, yes, okay to restart Focalin Exar 10 mg, take first thing in the morning, monitor for any chest pain, shortness of breath, or other  "cardiovascular side effects, if they occur stop medication and report to [facility] immediately.  Patient educated that stimulants can worsen anxiety and sleep, sleep already appears to be poor, patient to monitor at home.  7. Patient educated on risk versus benefit of stimulants, verbalized understanding.  8. Narcotics contract has already been signed, UDS is normal, Bassem report is normal.    Return in about 2 weeks (around 2023) for Medication Check.    Medications Issues:  BASSEM reviewed 2023 and is appropriate.  Narcotics contract to23    Controlled Substance Medication Contract    Controlled substance medications (i.e. benzodiazepines, opioids, amphetamines) are very useful, but have a high potential for misuse and are, therefore, closely controlled by local, state, and federal government(s). As a patient of [facility] you agree and understand the followin. I am responsible for the controlled substance medications prescribed to me. If my prescription is misplaced, stolen, or if \"I run out early,\" I understand this medication will not be replaced regardless of the circumstances.  2. Refills of controlled substance medications: (a) Will be made only during regular office hours Monday-Friday. Refills will not be made at night, weekends, or on holidays. (b) Will not be made if \"I lost my prescriptions,\" \"ran out early,\" or \"misplaced my medication\". I am solely responsible for taking the medication as prescribed and for keeping track of the remaining.   3. I agree to comply with urine drug testing and pill counts at the provider's discretion, thereby, documenting the proper use of any medications. If alcohol abuse is suspected, a breathalyzer or blood alcohol level may be ordered. Unannounced urine or serum toxicology specimens may be requested and my cooperation is required.  4. I understand that if I violate any of the above conditions, my prescriptions for controlled " medications my be terminated. If the violation involves obtaining these medications from another individual, or the concomitant use of non-prescription illicit (illegal) drugs, I may also be reported to other providers, pharmacies, medical facilities and the appropriate authorities.   5. I further understand that if I violate this controlled substance contract due to non-compliance of medical directions, such as the failure in taking medications as prescribed, utilizing other illicit drugs, or abuse of controlled medications, the prescription for controlled medications may be terminated.   6. I agree to keep my scheduled appointments and conduct myself in a courteous manner.  7. I agree not to sell, share, or give any medication to another person. I understand that such mishandling of my medication is a serious violation of this agreement and would result in my treatment being terminated without any recourse for appeal.   8. I agree not to take my medication from any physicians, nurse practitioners, pharmacies or other sources without telling my prescriber.  9. I agree to take my medication as my prescriber has instructed and not to alter the way I take my medication without first consulting my prescriber.  10. I agree to abstain from problematic alcohol usage, opioids, marijuana, cocaine, and other addictive substances.   11. If I am legally involved related to legal or illegal drugs, including alcohol, refill of controlled substances will not be given until a re-evaluation of my chemical dependency treatment plan has been completed. Refills are at the discretion of the prescriber.   12. I agree to fill all of my controlled medications at an in-state ([place]) pharmacy.   13. I understand that [facility] utilizes the [place] All Schedule Prescription Electronic Reporting (BASSEM) system and will monitor my prescription history via this source.    There are no discontinued medications.  New  Medications Ordered This Visit   Medications   • dexmethylphenidate XR (Focalin XR) 10 MG 24 hr capsule     Sig: Take 1 capsule by mouth Every Morning     Dispense:  14 capsule     Refill:  0     Barriers:  -Medically complex  -Side effects of medication  -Multiple psychiatric comorbidities   -Personality disorder suspected, need to rule out as treatment progresses  -No previous medical records to review     Strengths:   -motivated     Progress toward goal: Not at goal  Functional Status: Moderate impairment   Prognosis: Fair with Ongoing Treatment     No show policy:  We understand unexpected circumstances arise; however, anytime you miss your appointment we are unable to provide you appropriate care.  In addition, each appointment missed could have been used to provide care for others.  We ask that you call at least 24 hours in advance to cancel or reschedule an appointment. We would like to take this opportunity to remind you of our policy stating patients who miss THREE or more appointments without cancelling or rescheduling 24 hours in advance of the appointment may be subject to cancellation of any further visits with our clinic and recommendation to seek in-person services/visits. Please call [phone] to reschedule your appointment. If there are reasons that make it difficult for you to keep the appointments, please call and let us know how we can help. Please understand that medication prescribing will not continue without seeing your provider.      This document has been electronically signed by JUAN Marroquin  April 14, 2023 13:53 EDT    A total of 30 minutes was spent caring for this patient. That time was spent reviewing past notes, updating patient information, assessing patient for S/S of condition being treated, educating patient on different treatment options, placing orders, and documenting in the record.    Part of this note may be an electronic transcription/translation of spoken  language to printed text using the Dragon Dictation System. Some of the data in this electronic note has been brought forward from a previous encounter, any necessary changes have been made, it has been reviewed by this APRN, and it is accurate.

## 2023-04-14 ENCOUNTER — TELEMEDICINE (OUTPATIENT)
Dept: BEHAVIORAL HEALTH | Facility: CLINIC | Age: 23
End: 2023-04-14
Payer: COMMERCIAL

## 2023-04-14 DIAGNOSIS — F39 UNSPECIFIED MOOD (AFFECTIVE) DISORDER: ICD-10-CM

## 2023-04-14 DIAGNOSIS — F60.3 BORDERLINE PERSONALITY DISORDER: ICD-10-CM

## 2023-04-14 DIAGNOSIS — F41.1 GAD (GENERALIZED ANXIETY DISORDER): ICD-10-CM

## 2023-04-14 DIAGNOSIS — F90.0 ADHD (ATTENTION DEFICIT HYPERACTIVITY DISORDER), INATTENTIVE TYPE: Primary | ICD-10-CM

## 2023-04-14 PROCEDURE — 1159F MED LIST DOCD IN RCRD: CPT

## 2023-04-14 PROCEDURE — 99214 OFFICE O/P EST MOD 30 MIN: CPT

## 2023-04-14 PROCEDURE — 1160F RVW MEDS BY RX/DR IN RCRD: CPT

## 2023-04-14 RX ORDER — DEXMETHYLPHENIDATE HYDROCHLORIDE 10 MG/1
10 CAPSULE, EXTENDED RELEASE ORAL EVERY MORNING
Qty: 14 CAPSULE | Refills: 0 | Status: SHIPPED | OUTPATIENT
Start: 2023-04-14

## 2023-04-14 NOTE — PATIENT INSTRUCTIONS
Plan of Care:  Medication changes, yes, okay to restart Focalin Exar 10 mg, take first thing in the morning, monitor for any chest pain, shortness of breath, or other cardiovascular side effects, if they occur stop medication and report to [facility] immediately.  Patient educated that stimulants can worsen anxiety and sleep, sleep already appears to be poor, patient to monitor at home.  Patient educated on risk versus benefit of stimulants, verbalized understanding.  Narcotics contract has already been signed, UDS is normal, Ramon report is normal.    General Instructions:  Patient to monitor for side effects, worsening symptoms, and/or improvement, report to PMHNP [facility] immediatlenelsy.  If a sooner appointment is needed please call office at number listed below to schedule.  Please give office staff (1) week to schedule a referral, if you have not heard anything around that time call office and ask to speak to outgoing referral .    Aron Cobos   Psychiatric Mental Health Nurse Practitioner (PMHNP)  [address] [place] [address]  P: [phone]  F: [phone]     Stimulant General instructions:     Patient's are given a 30-day supply of medication with no refills, all prescriptions are sent electronically, and must be filled at a pharmacy located in [place].  The pharmacy will most likely request a photo ID and Social Security number to  medication.    The patient must call the office around 3 days prior to running out of medication to request a refill, please ask to speak to my medical assistant.  If your medication is lost or stolen it will not be replaced/refilled early. Any changes to medication require an appointment with your provider.  Please do not call the office after hours or on weekends requesting a refill.    Potential side effects include: Elevations in heart rate and blood pressure, decreased appetite, weight loss, increased sweating, insomnia, worsening anxiety, dry mouth,  agitation, GI upset, marline. (If any chest pain or shortness of breath occur stop medication and call 911 or go to nearest emergency room).      Generally speaking stimulants should be taken soon after awakening/mornings to minimize sleep disturbance, taking with food will minimize any GI problems.  Remember to stay hydrated and snack throughout the day if appetite suppression occurs.     These medications can increase risk of cardiovascular disease including heart attack and stroke.  Limit or cut back on the use of other stimulants (caffeine, nicotine, over-the-counter cold medicine that contains pseudoephedrine)    Over time a tolerance to the medication may occur making the medication slightly less effective.  To aid in preventing this from happening only take the medication when you need it, many patients are advised to go without medication on the weekends, or during periods when they are not working and focus/concentration are not needed.    For multiple reasons a stimulant medication may need to be discontinued or may not be appropriate depending on patient condition.  Nonstimulant options are available, they have virtually no abuse/addiction potential, do not require additional monitoring, are able to be prescribed greater than 30 days and with refills,  and are considered safer (much lower risk of elevating heart rate, blood pressure, and increasing risk for cardiovascular events).     Controlled Substance Statement:    As part of your treatment plan your provider has prescribed a stimulant medication.  As stated in your signed narcotics contract these medications carry with them a high risk of abuse, diversion, dependence and addiction.    Additional monitoring is required that includes state wide narcotics reports, random urine drug screens, vital signs, and random pill counts.     If you are unable or unwilling to comply with the above monitoring required your controlled substance medication may not be  refilled/continued.

## 2023-04-28 ENCOUNTER — TELEMEDICINE (OUTPATIENT)
Dept: BEHAVIORAL HEALTH | Facility: CLINIC | Age: 23
End: 2023-04-28
Payer: COMMERCIAL

## 2023-04-28 DIAGNOSIS — F39 UNSPECIFIED MOOD (AFFECTIVE) DISORDER: ICD-10-CM

## 2023-04-28 DIAGNOSIS — Z79.899 HIGH RISK MEDICATION USE: ICD-10-CM

## 2023-04-28 DIAGNOSIS — F90.0 ATTENTION DEFICIT HYPERACTIVITY DISORDER (ADHD), PREDOMINANTLY INATTENTIVE TYPE: Primary | ICD-10-CM

## 2023-04-28 DIAGNOSIS — F44.9 DISSOCIATION: ICD-10-CM

## 2023-04-28 DIAGNOSIS — F84.0 AUTISM SPECTRUM DISORDER: ICD-10-CM

## 2023-04-28 DIAGNOSIS — F60.3 BORDERLINE PERSONALITY DISORDER: ICD-10-CM

## 2023-04-28 PROBLEM — F90.9 ATTENTION DEFICIT HYPERACTIVITY DISORDER (ADHD): Chronic | Status: ACTIVE | Noted: 2022-11-18

## 2023-04-28 RX ORDER — DEXMETHYLPHENIDATE HYDROCHLORIDE 10 MG/1
10 CAPSULE, EXTENDED RELEASE ORAL EVERY MORNING
Qty: 30 CAPSULE | Refills: 0 | Status: SHIPPED | OUTPATIENT
Start: 2023-04-28

## 2023-04-28 RX ORDER — ESCITALOPRAM OXALATE 10 MG/1
10 TABLET ORAL DAILY
Qty: 30 TABLET | Refills: 2 | Status: SHIPPED | OUTPATIENT
Start: 2023-04-28

## 2023-04-28 NOTE — ASSESSMENT & PLAN NOTE
[facility] testing needed, will place order once patient clears it with her insurance.  Ramon reviewed/appropriate

## 2023-04-28 NOTE — PATIENT INSTRUCTIONS
"  Patient instructions  Medication Changes today: Okay to restart Lexapro 10 mg a day per patient request, was discontinued prior due to overheating, but patient feels like that was nifedipine now, patient requesting refill for Focalin, 30-day supply sent to pharmacy  Monitor for appetite suppression/weight loss, worsening anxiety, worsening sleep.  Remember to stay hydrated and snack throughout the day to avoid afternoon crash.  Any cardiovascular symptoms including chest pain, shortness of breath, dizziness, lightheadedness, stop medication and go to nearest emergency room or call 911.  Continue counseling/therapy with established therapist weekly  Prior to med changes would want Gene[facility] psychotropic panel as well as CMP, CBC, thyroid panel, vitamin D and vitamin B12, last labs were from July 2022  I would also like collateral information from patient's therapist going forward to discuss plan of care    This APRN reviewed with patient and caregiver behavioral interventions that have been shown to be helpful with ADHD behaviors. These include but are not limited to:  Maintaining a daily schedule  Keeping distractions to a minimum  Providing specific and logical places for the child to keep his schoolwork, toys, and clothes  Setting small, reachable goals   Rewarding positive behavior  Identifying unintentional reinforcement of negative behaviors  Using charts and checklists to help the child stay \"on task\"  Limiting choices  Finding activities in which the child can be successful   Using calm discipline (eg, time out, distraction, removing the child from the situation)    Stimulant General instructions:     Patient's are given a 30-day supply of medication with no refills, all prescriptions are sent electronically, and must be filled at a pharmacy located in [place].  The pharmacy will most likely request a photo ID and Social Security number to  medication.    The patient must call the office around 3 " days prior to running out of medication to request a refill, please ask to speak to my medical assistant.  If your medication is lost or stolen it will not be replaced/refilled early. Any changes to medication require an appointment with your provider.  Please do not call the office after hours or on weekends requesting a refill.    Potential side effects include: Elevations in heart rate and blood pressure, decreased appetite, weight loss, increased sweating, insomnia, worsening anxiety, dry mouth, agitation, GI upset, marline. (If any chest pain or shortness of breath occur stop medication and call 911 or go to nearest emergency room).      Generally speaking stimulants should be taken soon after awakening/mornings to minimize sleep disturbance, taking with food will minimize any GI problems.  Remember to stay hydrated and snack throughout the day if appetite suppression occurs.     These medications can increase risk of cardiovascular disease including heart attack and stroke.  Limit or cut back on the use of other stimulants (caffeine, nicotine, over-the-counter cold medicine that contains pseudoephedrine)    Over time a tolerance to the medication may occur making the medication slightly less effective.  To aid in preventing this from happening only take the medication when you need it, many patients are advised to go without medication on the weekends, or during periods when they are not working and focus/concentration are not needed.    For multiple reasons a stimulant medication may need to be discontinued or may not be appropriate depending on patient condition.  Nonstimulant options are available, they have virtually no abuse/addiction potential, do not require additional monitoring, are able to be prescribed greater than 30 days and with refills,  and are considered safer (much lower risk of elevating heart rate, blood pressure, and increasing risk for cardiovascular events).     Controlled Substance  Statement:    As part of your treatment plan your provider has prescribed a stimulant medication.  As stated in your signed narcotics contract these medications carry with them a high risk of abuse, diversion, dependence and addiction.    Additional monitoring is required that includes state wide narcotics reports, random urine drug screens, vital signs, and random pill counts.     If you are unable or unwilling to comply with the above monitoring required your controlled substance medication may not be refilled/continued.

## 2023-04-28 NOTE — PROGRESS NOTES
Patient assessed today via MyChart through [facility] pt is at home in a secure environment and verbalizes privacy during interview. NIR Sequeira is at home in a secure environment using a secure laptop. The patient's condition being diagnosed/treated is appropriate for telemedicine. The provider identified himself as well as his credentials.   The patient, and/or patient's guardian, consent to be seen remotely, and when consent is given they understand that the consent allows for patient identifiable information to be sent to a third party as needed.   They may refuse to be seen remotely at any time. The electronic data is encrypted and password protected, and the patient and/or guardian has been advised of the potential risks to privacy not withstanding such measures.     Dinh Gaviria is a 23 y.o. female who presents today 04/28/2023     CC: Med management follow up for depression, anxiety, ADHD and personality disorder    HPI:    Appt date: PHQ DIETER Med Changes/Issues/Summary:   1/5/23 INITIAL EVAL 1 15 Newly identified/Medication Changes today: Yes, OK to start Risperdal 0.25 mg at bedtime, increase lexapro to 20 mg, counseling (DBT) recommended/Follow up 1 month     Appt date: PHQ DIETER Med Changes/Issues/Summary:   2/3/23  12 18 Condition improving/worsening/Medication Changes today: Yes, OK to d/c risperdal due to side effects/Follow Up 2 weeks     Appt date: PHQ DIETER Med Changes/Issues/Summary:   2/27/23 20 13 Condition worsening/Medication Changes today: Yes, OK to cut lexapro dose to 10 mg/day, OK to switch to trintellix once filled.  Continue seeing Katy Sol weekly at [facility]/Follow up 1 month     Appt date: PHQ DIETER Med Changes/Issues/Summary:   4/14/23 20 15 Condition worsening/Medication Changes today: Yes, OK to re-start folcin xr 10 mg/day for ADHD/Follow Up 2 weeks     Established patient of mine seen seen 2 weeks ago, at that time Focalin was restarted for ADHD, patient reports  improvement in S/S particularly improved concentration/focus, multitasking, organization, but main issue today appears to be mood, reports increased mood instability, patient educated risk versus benefit of stimulants, patient reports her and her therapist think she needs to be on a mood stabilizer, patient has history of multiple failed medications due to side effects or lack of benefit, patient also reports therapist once her seen by a psychologist due to suspected dissociative identity disorder, patient educated on that being a very rare/controversial diagnosis, S/S of borderline discussed that could likely be causing dissociation, poor focus, and mood instability.       PHQ-9 Depression Screening  Little interest or pleasure in doing things? (P) 3-->nearly every day   Feeling down, depressed, or hopeless? (P) 2-->more than half the days   Trouble falling or staying asleep, or sleeping too much? (P) 3-->nearly every day   Feeling tired or having little energy? (P) 3-->nearly every day   Poor appetite or overeating? (P) 3-->nearly every day   Feeling bad about yourself - or that you are a failure or have let yourself or your family down? (P) 1-->several days   Trouble concentrating on things, such as reading the newspaper or watching television? (P) 2-->more than half the days   Moving or speaking so slowly that other people could have noticed? Or the opposite - being so fidgety or restless that you have been moving around a lot more than usual? (P) 3-->nearly every day   Thoughts that you would be better off dead, or of hurting yourself in some way? (P) 0-->not at all   PHQ-9 Total Score (P) 20   If you checked off any problems, how difficult have these problems made it for you to do your work, take care of things at home, or get along with other people? (P) somewhat difficult     GAD7 DOCUMENTATION    Feeling nervous, anxious or on edge (P) 1   Not being able to stop or control worrying (P) 3   Worrying too much  about different things (P) 1   Trouble relaxing (P) 2   Being so restless that it is hard to sit still (P) 0   Becoming easily annoyed or irritable (P) 3   Feeling Afraid as if something awful might happen (P) 0   DIETER Total Score (P) 10   If you checked any problems, how difficult have these problems made it for you to do your work, take care of things at home or get along with other people? (P) Somewhat difficult          Medical/surgical/social/family history reviewed and updated, problem list reviewed/updated, medications and allergies reviewed/updated.      Social History     Socioeconomic History   • Marital status: Single   • Number of children: 1   Tobacco Use   • Smoking status: Former     Packs/day: 0.25     Years: 2.00     Pack years: 0.50     Types: Electronic Cigarette, Cigarettes     Start date: 2016     Quit date: 10/31/2017     Years since quittin.4   • Smokeless tobacco: Never   Vaping Use   • Vaping Use: Former   • Quit date: 10/31/2017   Substance and Sexual Activity   • Alcohol use: Yes   • Drug use: Not Currently   • Sexual activity: Yes     Partners: Male     Birth control/protection: Nexplanon      Social History     Social History Narrative    Martial Status: Lives with significant other in [facility], patient has 3 children    Employed:No patient reports mental health does not allow them to work, 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     House:Patient was born in [place] but raised in [place], moved to [place] roughly 2 years ago for partner      Allergy:   Allergies   Allergen Reactions   • Buspirone Swelling and Other (See Comments)     SERVE SWELLING IN THE FACE PER PT  Heart palpitations   • Lamotrigine Rash and Hives   • Norelgestromin-Eth Estradiol Swelling     Birth control patch adhesive per pt   • Oxcarbazepine Rash   • Amphetamine-Dextroamphetamine Palpitations   • Dicyclomine Rash   • Metoclopramide Irritability and Other (See Comments)      Causes agitation and aggression      Current Medications:   Current Outpatient Medications   Medication Sig Dispense Refill   • dexmethylphenidate XR (Focalin XR) 10 MG 24 hr capsule Take 1 capsule by mouth Every Morning 30 capsule 0   • escitalopram (Lexapro) 10 MG tablet Take 1 tablet by mouth Daily. 30 tablet 2     No current facility-administered medications for this visit.     Review of Systems   Constitutional: Negative.    Respiratory: Negative.  Negative for chest tightness and shortness of breath.    Cardiovascular: Negative for chest pain.   Neurological: Negative.  Negative for dizziness and light-headedness.   Psychiatric/Behavioral: Positive for + depression/anxiety/ADHD      Objective   Physical Exam  Constitutional:       Appearance: Normal appearance, appears in no acute distress  Pulmonary:      Effort: Pulmonary effort is normal.      Comments: No shortness of breath reported  Musculoskeletal:      Comments: No recent falls/injury reported   Neurological:      Mental Status: He/she is alert and oriented to person, place, and time.   Psychiatric:         Thought Content: Thought content normal.         Judgment: Judgment normal.     Mental Status Exam:   Hygiene:   good  Cooperation:  Cooperative  Eye Contact:  Good  Psychomotor Behavior:  Appropriate  Affect:  Appropriate  Mood: appropriate  Speech:  Normal  Thought Process:  Goal directed  Thought Content:  Normal  Suicidal:  None  Homicidal:  None  Hallucinations:  None  Delusion:  None  Memory:  Intact  Reliability:  poor/fair  Insight:  Fair  Judgement:  Fair  Impulse Control:  Fair    Vital Signs:   There were no vitals taken for this visit.   No LMP recorded.   There is no height or weight on file to calculate BMI.     Wt Readings from Last 5 Encounters:   03/01/23 114 kg (252 lb)   02/22/23 107 kg (235 lb)   01/27/23 107 kg (235 lb)   01/05/23 109 kg (240 lb)   11/18/22 111 kg (244 lb)     BP Readings from Last 5 Encounters:   03/01/23  131/84   02/22/23 127/88   01/27/23 136/93   01/05/23 122/82   11/18/22 116/86     Pulse Readings from Last 5 Encounters:   03/01/23 111   02/22/23 90   01/27/23 86   01/05/23 81   11/18/22 86     Lab Results:  CMP        5/27/2022    12:53 5/30/2022    17:39 7/1/2022    17:49   CMP   Glucose 108   82   84     BUN 4   3   5     Creatinine 0.52   0.47   0.70     EGFR  136.5   124.0     Sodium 138   137   136     Potassium 4.8   4.2   4.1     Chloride 102   106   103     Calcium 9.3   8.7   8.6     Total Protein 6.9       Total Protein  6.3   6.2     Albumin 3.8   3.30   3.00     Globulin 3.1       Globulin  3.0   3.2     Total Bilirubin 0.4   0.4   0.4     Alkaline Phosphatase 126   114   158     AST (SGOT) 15   16   14     ALT (SGPT) 10   11   7     Albumin/Globulin Ratio  1.1   0.9     BUN/Creatinine Ratio 8   6.4   7.1     Anion Gap  12.0   15.0         CBC        5/30/2022    17:39 7/1/2022    17:49 7/3/2022    09:59   CBC   WBC 10.44   10.98   14.12     RBC 4.42   4.67   3.33     Hemoglobin 11.4   11.7   8.5     Hematocrit 35.9   36.9   25.7     MCV 81.2   79.0   77.2     MCH 25.8   25.1   25.5     MCHC 31.8   31.7   33.1     RDW 15.5   16.0   15.5     Platelets 312   284   258         A1C Last 3 Results        7/1/2022    17:49   HGBA1C Last 3 Results   Hemoglobin A1C 6.00       Last Urine Toxicity         Latest Ref Rng & Units 3/1/2023 2/23/2023   LAST URINE TOXICITY RESULTS   Creatinine, Urine 20.0 - 300.0 mg/dL  20.7     Amphetamine, Urine Qual Negative Negative   Negative     Barbiturates Screen, Urine Negative Negative   Negative     Benzodiazepine Screen, Urine Negative Negative   Negative     Buprenorphine, Screen, Urine Negative Negative      Cocaine Screen, Urine Negative Negative   Negative     Methadone Screen , Urine Negative Negative   Negative     Methamphetamine, Ur Negative Negative                 EKG Results:  Status: (Other)      HEART RATE= 99  bpm  RR Interval= 604  ms  AR Interval= 149   ms  P Horizontal Axis= -6  deg  P Front Axis= 77  deg  QRSD Interval= 88  ms  QT Interval= 349  ms  QRS Axis= 45  deg  T Wave Axis= 23  deg  - NORMAL ECG -  Sinus rhythm  No change from prior tracing  Electronically Signed By: Geronimo Betts ([facility]) 01-Mar-2023 21:29:46  Date and Time of Study: 2023-03-01 20:32:29        Imaging Results:  XR Femur 2 View Right    Result Date: 2/22/2023  1. No acute process identified in the pelvis, right hip and right femur.  This report was finalized on 2/22/2023 6:29 PM by Dr. Frederick Roy M.D.      XR Hip With or Without Pelvis 2 - 3 View Right    Result Date: 2/22/2023  1. No acute process identified in the pelvis, right hip and right femur.  This report was finalized on 2/22/2023 6:29 PM by Dr. Frederick Roy M.D.      Assessment & Plan   Problems Addressed this Visit        Psychiatry Problems    Attention deficit hyperactivity disorder (ADHD) - Primary (Chronic)     Psychological condition is improving with treatment.  Continue current treatment regimen.  Psychological condition  will be reassessed in 4 weeks.         Relevant Medications    dexmethylphenidate XR (Focalin XR) 10 MG 24 hr capsule    escitalopram (Lexapro) 10 MG tablet    Other Relevant Orders    Ambulatory Referral to Psychology (Completed)    Unspecified mood (affective) disorder (Chronic)     Psychological condition is worsening.  Continue current treatment regimen.  Psychological condition  will be reassessed in 4 weeks.         Relevant Medications    dexmethylphenidate XR (Focalin XR) 10 MG 24 hr capsule    escitalopram (Lexapro) 10 MG tablet    Other Relevant Orders    Ambulatory Referral to Psychology (Completed)    Borderline personality disorder (Chronic)     Psychological condition is worsening.  Continue current treatment regimen.  Psychological condition  will be reassessed in 4 weeks.         Relevant Medications    dexmethylphenidate XR (Focalin XR) 10 MG 24 hr capsule    escitalopram  (Lexapro) 10 MG tablet    Other Relevant Orders    Ambulatory Referral to Psychology (Completed)    Dissociation    Relevant Medications    dexmethylphenidate XR (Focalin XR) 10 MG 24 hr capsule    escitalopram (Lexapro) 10 MG tablet    Other Relevant Orders    Ambulatory Referral to Psychology (Completed)       Other    High risk medication use     [facility] testing needed, will place order once patient clears it with her insurance.  Ramon reviewed/appropriate        Other Visit Diagnoses     Autism spectrum disorder        Relevant Medications    dexmethylphenidate XR (Focalin XR) 10 MG 24 hr capsule    escitalopram (Lexapro) 10 MG tablet    Other Relevant Orders    Ambulatory Referral to Psychology (Completed)      Diagnoses       Codes Comments    Attention deficit hyperactivity disorder (ADHD), predominantly inattentive type    -  Primary ICD-10-CM: F90.0  ICD-9-CM: 314.00     Autism spectrum disorder     ICD-10-CM: F84.0  ICD-9-CM: 299.00     Unspecified mood (affective) disorder     ICD-10-CM: F39  ICD-9-CM: 296.90     Dissociation     ICD-10-CM: F44.9  ICD-9-CM: 300.15     Borderline personality disorder     ICD-10-CM: F60.3  ICD-9-CM: 301.83     High risk medication use     ICD-10-CM: Z79.899  ICD-9-CM: V58.69         • Prior psychiatry note, pt history, review of systems, medications, allergies, reviewed, patient was screened today for depression/anxiety, PHQ/DIETER scores reviewed.  Most recent vitals/labs reviewed.  • Pt was given appropriate time to ask questions and concerns were addressed. A thorough discussion was had that included review of disease process, need for continued monitoring and additional treatment options including use of pharmacological and non-pharmacological approaches to care, decisions were made and agreed upon by patient and provider.   • Discussed the risks, benefits, and potential side effects of the medications; patient ackowledged and verbally consented. Patient is advised that  "non-stimulant medications for ADHD are available also that carry much less risk and require much less monitoring.  • Patient is agreeable to call the office with any worsening of symptoms or onset of intolerable side effects. Patient is agreeable to call 911 or go to the nearest ER should he/she begin having SI/HI.     Patient instructions  1. Medication Changes today: Okay to restart Lexapro 10 mg a day per patient request, was discontinued prior due to overheating, but patient feels like that was nifedipine now, patient requesting refill for Focalin, 30-day supply sent to pharmacy  2. Monitor for appetite suppression/weight loss, worsening anxiety, worsening sleep.  Remember to stay hydrated and snack throughout the day to avoid afternoon crash.  Any cardiovascular symptoms including chest pain, shortness of breath, dizziness, lightheadedness, stop medication and go to nearest emergency room or call 911.  3. Continue counseling/therapy with established therapist weekly  4. Prior to med changes would want [facility] psychotropic panel as well as CMP, CBC, thyroid panel, vitamin D and vitamin B12, last labs were from July 2022  5. I would also like collateral information from patient's therapist going forward to discuss plan of care    This APRN reviewed with patient and caregiver behavioral interventions that have been shown to be helpful with ADHD behaviors. These include but are not limited to:  · Maintaining a daily schedule  · Keeping distractions to a minimum  · Providing specific and logical places for the child to keep his schoolwork, toys, and clothes  · Setting small, reachable goals   · Rewarding positive behavior  · Identifying unintentional reinforcement of negative behaviors  · Using charts and checklists to help the child stay \"on task\"  · Limiting choices  · Finding activities in which the child can be successful   · Using calm discipline (eg, time out, distraction, removing the child from the " situation)    Return in about 1 month (around 5/28/2023) for Medication Check.    Medications Issues:    Medications Discontinued During This Encounter   Medication Reason   • ibuprofen (ADVIL,MOTRIN) 600 MG tablet Historical Med - Therapy completed   • dexmethylphenidate XR (Focalin XR) 10 MG 24 hr capsule Reorder     New Medications Ordered This Visit   Medications   • dexmethylphenidate XR (Focalin XR) 10 MG 24 hr capsule     Sig: Take 1 capsule by mouth Every Morning     Dispense:  30 capsule     Refill:  0   • escitalopram (Lexapro) 10 MG tablet     Sig: Take 1 tablet by mouth Daily.     Dispense:  30 tablet     Refill:  2     Barriers:  -Medically complex  -Side effects of medication  -Multiple psychiatric comorbidities   -Personality disorder suspected, need to rule out as treatment progresses  -No previous medical records to review     Strengths:   -motivated     Progress toward goal: Not at goal  Functional Status: Moderate impairment   Prognosis: Fair with Ongoing Treatment     No show policy:  We understand unexpected circumstances arise; however, anytime you miss your appointment we are unable to provide you appropriate care.  In addition, each appointment missed could have been used to provide care for others.  We ask that you call at least 24 hours in advance to cancel or reschedule an appointment. We would like to take this opportunity to remind you of our policy stating patients who miss THREE or more appointments without cancelling or rescheduling 24 hours in advance of the appointment may be subject to cancellation of any further visits with our clinic and recommendation to seek in-person services/visits. Please call [phone] to reschedule your appointment. If there are reasons that make it difficult for you to keep the appointments, please call and let us know how we can help. Please understand that medication prescribing will not continue without seeing your provider.      This document has  been electronically signed by JUAN Marroquin  April 28, 2023 17:05 EDT    Part of this note may be an electronic transcription/translation of spoken language to printed text using the Dragon Dictation System. Some of the data in this electronic note has been brought forward from a previous encounter, any necessary changes have been made, it has been reviewed by this APRN, and it is accurate.

## 2023-04-28 NOTE — ASSESSMENT & PLAN NOTE
Psychological condition is worsening.  Continue current treatment regimen.  Psychological condition  will be reassessed in 4 weeks.

## 2023-06-29 PROBLEM — Z30.2 ENCOUNTER FOR STERILIZATION: Status: ACTIVE | Noted: 2023-06-29

## 2023-07-07 PROBLEM — M54.9 BACK PAIN: Status: ACTIVE | Noted: 2023-06-07

## 2023-07-07 PROBLEM — R35.0 INCREASED FREQUENCY OF URINATION: Status: ACTIVE | Noted: 2023-06-07

## 2023-07-07 PROBLEM — R30.0 DIFFICULT OR PAINFUL URINATION: Status: ACTIVE | Noted: 2023-06-07

## 2023-08-14 ENCOUNTER — TELEPHONE (OUTPATIENT)
Dept: OBSTETRICS AND GYNECOLOGY | Facility: CLINIC | Age: 23
End: 2023-08-14
Payer: COMMERCIAL

## 2023-08-14 NOTE — TELEPHONE ENCOUNTER
Pt is currently scheduled on Monday Aug 28th but pt is requesting a Friday because of husbands work schedule.  Is it okay to reschedule to a Friday?

## 2023-08-16 ENCOUNTER — TELEPHONE (OUTPATIENT)
Dept: OBSTETRICS AND GYNECOLOGY | Facility: CLINIC | Age: 23
End: 2023-08-16
Payer: COMMERCIAL

## 2023-09-06 ENCOUNTER — PRE-ADMISSION TESTING (OUTPATIENT)
Dept: PREADMISSION TESTING | Facility: HOSPITAL | Age: 23
End: 2023-09-06
Payer: COMMERCIAL

## 2023-09-06 VITALS
OXYGEN SATURATION: 100 % | BODY MASS INDEX: 41.3 KG/M2 | TEMPERATURE: 97.8 F | WEIGHT: 257 LBS | SYSTOLIC BLOOD PRESSURE: 128 MMHG | RESPIRATION RATE: 20 BRPM | HEIGHT: 66 IN | HEART RATE: 89 BPM | DIASTOLIC BLOOD PRESSURE: 86 MMHG

## 2023-09-06 LAB
ANION GAP SERPL CALCULATED.3IONS-SCNC: 12.3 MMOL/L (ref 5–15)
BUN SERPL-MCNC: 10 MG/DL (ref 6–20)
BUN/CREAT SERPL: 12.7 (ref 7–25)
CALCIUM SPEC-SCNC: 9.6 MG/DL (ref 8.6–10.5)
CHLORIDE SERPL-SCNC: 102 MMOL/L (ref 98–107)
CO2 SERPL-SCNC: 24.7 MMOL/L (ref 22–29)
CREAT SERPL-MCNC: 0.79 MG/DL (ref 0.57–1)
EGFRCR SERPLBLD CKD-EPI 2021: 106.6 ML/MIN/1.73
GLUCOSE SERPL-MCNC: 121 MG/DL (ref 65–99)
HCG SERPL QL: NEGATIVE
POTASSIUM SERPL-SCNC: 3.9 MMOL/L (ref 3.5–5.2)
SODIUM SERPL-SCNC: 139 MMOL/L (ref 136–145)

## 2023-09-06 PROCEDURE — 85025 COMPLETE CBC W/AUTO DIFF WBC: CPT | Performed by: OBSTETRICS & GYNECOLOGY

## 2023-09-06 PROCEDURE — 84703 CHORIONIC GONADOTROPIN ASSAY: CPT

## 2023-09-06 PROCEDURE — 80048 BASIC METABOLIC PNL TOTAL CA: CPT

## 2023-09-06 PROCEDURE — 36415 COLL VENOUS BLD VENIPUNCTURE: CPT

## 2023-09-06 RX ORDER — ONDANSETRON 4 MG/1
4 TABLET, ORALLY DISINTEGRATING ORAL EVERY 8 HOURS PRN
COMMUNITY

## 2023-09-06 RX ORDER — ACETAMINOPHEN 500 MG
1000 TABLET ORAL EVERY 6 HOURS PRN
COMMUNITY

## 2023-09-06 RX ORDER — LAMOTRIGINE 100 MG/1
100 TABLET ORAL NIGHTLY
COMMUNITY

## 2023-09-06 RX ORDER — BIOTIN 1 MG
1000 TABLET ORAL DAILY
COMMUNITY

## 2023-09-06 RX ORDER — CHLORHEXIDINE GLUCONATE 500 MG/1
1 CLOTH TOPICAL
COMMUNITY

## 2023-09-06 NOTE — DISCHARGE INSTRUCTIONS
Take the following medications the morning of surgery:        If you are on prescription narcotic pain medication to control your pain you may also take that medication the morning of surgery.    General Instructions:  Do not eat solid food after midnight the night before surgery.  You may drink clear liquids day of surgery but must stop at least one hour before your hospital arrival time.  It is beneficial for you to have a clear drink that contains carbohydrates the day of surgery.  We suggest a 12 to 20 ounce bottle of Gatorade or Powerade for non-diabetic patients or a 12 to 20 ounce bottle of G2 or Powerade Zero for diabetic patients. (Pediatric patients, are not advised to drink a 12 to 20 ounce carbohydrate drink)    Clear liquids are liquids you can see through.  Nothing red in color.     Plain water                               Sports drinks  Sodas                                   Gelatin (Jell-O)  Fruit juices without pulp such as white grape juice and apple juice  Popsicles that contain no fruit or yogurt  Tea or coffee (no cream or milk added)  Gatorade / Powerade  G2 / Powerade Zero    Infants may have breast milk up to four hours before surgery.  Infants drinking formula may drink formula up to six hours before surgery.   Patients who avoid smoking, chewing tobacco and alcohol for 4 weeks prior to surgery have a reduced risk of post-operative complications.  Quit smoking as many days before surgery as you can.  Do not smoke, use chewing tobacco or drink alcohol the day of surgery.   If applicable bring your C-PAP/ BI-PAP machine in with you to preop day of surgery.  Bring any papers given to you in the doctor’s office.  Wear clean comfortable clothes.  Do not wear contact lenses, false eyelashes or make-up.  Bring a case for your glasses.   Bring crutches or walker if applicable.  Remove all piercings.  Leave jewelry and any other valuables at home.  Hair extensions with metal clips must be removed  prior to surgery.  The Pre-Admission Testing nurse will instruct you to bring medications if unable to obtain an accurate list in Pre-Admission Testing.        If you were given a blood bank ID arm band remember to bring it with you the day of surgery.    Preventing a Surgical Site Infection:  For 2 to 3 days before surgery, avoid shaving with a razor because the razor can irritate skin and make it easier to develop an infection.    Any areas of open skin can increase the risk of a post-operative wound infection by allowing bacteria to enter and travel throughout the body.  Notify your surgeon if you have any skin wounds / rashes even if it is not near the expected surgical site.  The area will need assessed to determine if surgery should be delayed until it is healed.  The night prior to surgery shower using a fresh bar of anti-bacterial soap (such as Dial) and clean washcloth.  Sleep in a clean bed with clean clothing.  Do not allow pets to sleep with you.  Shower on the morning of surgery using a fresh bar of anti-bacterial soap (such as Dial) and clean washcloth.  Dry with a clean towel and dress in clean clothing.  Ask your surgeon if you will be receiving antibiotics prior to surgery.  Make sure you, your family, and all healthcare providers clean their hands with soap and water or an alcohol based hand  before caring for you or your wound.    Day of surgery:9/8/2023   1:30 pm  Your arrival time is approximately two hours before your scheduled surgery time.  Upon arrival, a Pre-op nurse and Anesthesiologist will review your health history, obtain vital signs, and answer questions you may have.  The only belongings needed at this time will be a list of your home medications and if applicable your C-PAP/BI-PAP machine.  A Pre-op nurse will start an IV and you may receive medication in preparation for surgery, including something to help you relax.     Please be aware that surgery does come with  discomfort.  We want to make every effort to control your discomfort so please discuss any uncontrolled symptoms with your nurse.   Your doctor will most likely have prescribed pain medications.      If you are going home after surgery you will receive individualized written care instructions before being discharged.  A responsible adult must drive you to and from the hospital on the day of your surgery and stay with you for 24 hours.  Discharge prescriptions can be filled by the hospital pharmacy during regular pharmacy hours.  If you are having surgery late in the day/evening your prescription may be e-prescribed to your pharmacy.  Please verify your pharmacy hours or chose a 24 hour pharmacy to avoid not having access to your prescription because your pharmacy has closed for the day.    If you are staying overnight following surgery, you will be transported to your hospital room following the recovery period.  [facility] has all private rooms.    If you have any questions please call Pre-Admission Testing at [phone].  Deductibles and co-payments are collected on the day of service. Please be prepared to pay the required co-pay, deductible or deposit on the day of service as defined by your plan.    Call your surgeon immediately if you experience any of the following symptoms:  Sore Throat  Shortness of Breath or difficulty breathing  Cough  Chills  Body soreness or muscle pain  Headache  Fever  New loss of taste or smell  Do not arrive for your surgery ill.  Your procedure will need to be rescheduled to another time.  You will need to call your physician before the day of surgery to avoid any unnecessary exposure to hospital staff as well as other patients.  CHLORHEXIDINE CLOTH INSTRUCTIONS  The morning of surgery follow these instructions using the Chlorhexidine cloths you've been given.  These steps reduce bacteria on the body.  Do not use the cloths near your eyes, ears mouth, genitalia or  on open wounds.  Throw the cloths away after use but do not try to flush them down a toilet.      Open and remove one cloth at a time from the package.    Leave the cloth unfolded and begin the bathing.  Massage the skin with the cloths using gentle pressure to remove bacteria.  Do not scrub harshly.   Follow the steps below with one 2% CHG cloth per area (6 total cloths).  One cloth for neck, shoulders and chest.  One cloth for both arms, hands, fingers and underarms (do underarms last).  One cloth for the abdomen followed by groin.  One cloth for right leg and foot including between the toes.  One cloth for left leg and foot including between the toes.  The last cloth is to be used for the back of the neck, back and buttocks.    Allow the CHG to air dry 3 minutes on the skin which will give it time to work and decrease the chance of irritation.  The skin may feel sticky until it is dry.  Do not rinse with water or any other liquid or you will lose the beneficial effects of the CHG.  If mild skin irritation occurs, do rinse the skin to remove the CHG.  Report this to the nurse at time of admission.  Do not apply lotions, creams, ointments, deodorants or perfumes after using the clothes. Dress in clean clothes before coming to the hospital.

## 2023-09-07 ENCOUNTER — TELEPHONE (OUTPATIENT)
Dept: OBSTETRICS AND GYNECOLOGY | Facility: CLINIC | Age: 23
End: 2023-09-07
Payer: COMMERCIAL

## 2023-09-07 NOTE — TELEPHONE ENCOUNTER
----- Message from Luis Alberto Asher MD sent at 9/6/2023  4:06 PM EDT -----  Galilea, normal pre op lab. Thanks, Dr. Asher

## 2023-09-08 ENCOUNTER — HOSPITAL ENCOUNTER (OUTPATIENT)
Facility: HOSPITAL | Age: 23
Setting detail: HOSPITAL OUTPATIENT SURGERY
Discharge: HOME | End: 2023-09-08
Attending: OBSTETRICS & GYNECOLOGY | Admitting: OBSTETRICS & GYNECOLOGY
Payer: COMMERCIAL

## 2023-09-08 ENCOUNTER — ANESTHESIA EVENT (OUTPATIENT)
Dept: PERIOP | Facility: HOSPITAL | Age: 23
End: 2023-09-08
Payer: COMMERCIAL

## 2023-09-08 ENCOUNTER — ANESTHESIA (OUTPATIENT)
Dept: PERIOP | Facility: HOSPITAL | Age: 23
End: 2023-09-08
Payer: COMMERCIAL

## 2023-09-08 VITALS
HEART RATE: 87 BPM | RESPIRATION RATE: 16 BRPM | TEMPERATURE: 97.5 F | OXYGEN SATURATION: 93 % | DIASTOLIC BLOOD PRESSURE: 59 MMHG | SYSTOLIC BLOOD PRESSURE: 100 MMHG

## 2023-09-08 DIAGNOSIS — Z30.46 NEXPLANON REMOVAL: Primary | ICD-10-CM

## 2023-09-08 DIAGNOSIS — Z30.2 ENCOUNTER FOR STERILIZATION: ICD-10-CM

## 2023-09-08 LAB — GLUCOSE BLDC GLUCOMTR-MCNC: 95 MG/DL (ref 70–130)

## 2023-09-08 PROCEDURE — 25010000002 MIDAZOLAM PER 1 MG: Performed by: ANESTHESIOLOGY

## 2023-09-08 PROCEDURE — 25010000002 PROPOFOL 10 MG/ML EMULSION: Performed by: ANESTHESIOLOGY

## 2023-09-08 PROCEDURE — 25010000002 HYDROMORPHONE PER 4 MG: Performed by: ANESTHESIOLOGY

## 2023-09-08 PROCEDURE — 25010000002 SUGAMMADEX 200 MG/2ML SOLUTION: Performed by: ANESTHESIOLOGY

## 2023-09-08 PROCEDURE — 25010000002 FENTANYL CITRATE (PF) 50 MCG/ML SOLUTION: Performed by: ANESTHESIOLOGY

## 2023-09-08 PROCEDURE — 94640 AIRWAY INHALATION TREATMENT: CPT

## 2023-09-08 PROCEDURE — 58661 LAPAROSCOPY REMOVE ADNEXA: CPT | Performed by: OBSTETRICS & GYNECOLOGY

## 2023-09-08 PROCEDURE — 82948 REAGENT STRIP/BLOOD GLUCOSE: CPT

## 2023-09-08 PROCEDURE — 88302 TISSUE EXAM BY PATHOLOGIST: CPT | Performed by: OBSTETRICS & GYNECOLOGY

## 2023-09-08 PROCEDURE — 94799 UNLISTED PULMONARY SVC/PX: CPT

## 2023-09-08 PROCEDURE — 11982 REMOVE DRUG IMPLANT DEVICE: CPT | Performed by: OBSTETRICS & GYNECOLOGY

## 2023-09-08 PROCEDURE — 25010000002 SUCCINYLCHOLINE PER 20 MG: Performed by: ANESTHESIOLOGY

## 2023-09-08 RX ORDER — LIDOCAINE HYDROCHLORIDE 20 MG/ML
INJECTION, SOLUTION INFILTRATION; PERINEURAL AS NEEDED
Status: DISCONTINUED | OUTPATIENT
Start: 2023-09-08 | End: 2023-09-08 | Stop reason: SURG

## 2023-09-08 RX ORDER — MAGNESIUM HYDROXIDE 1200 MG/15ML
LIQUID ORAL AS NEEDED
Status: DISCONTINUED | OUTPATIENT
Start: 2023-09-08 | End: 2023-09-08 | Stop reason: HOSPADM

## 2023-09-08 RX ORDER — ONDANSETRON 2 MG/ML
4 INJECTION INTRAMUSCULAR; INTRAVENOUS ONCE AS NEEDED
Status: DISCONTINUED | OUTPATIENT
Start: 2023-09-08 | End: 2023-09-08 | Stop reason: HOSPADM

## 2023-09-08 RX ORDER — HYDROMORPHONE HYDROCHLORIDE 1 MG/ML
0.25 INJECTION, SOLUTION INTRAMUSCULAR; INTRAVENOUS; SUBCUTANEOUS
Status: DISCONTINUED | OUTPATIENT
Start: 2023-09-08 | End: 2023-09-08 | Stop reason: HOSPADM

## 2023-09-08 RX ORDER — PROMETHAZINE HYDROCHLORIDE 25 MG/1
25 TABLET ORAL ONCE AS NEEDED
Status: DISCONTINUED | OUTPATIENT
Start: 2023-09-08 | End: 2023-09-08 | Stop reason: HOSPADM

## 2023-09-08 RX ORDER — NALOXONE HCL 0.4 MG/ML
0.2 VIAL (ML) INJECTION AS NEEDED
Status: DISCONTINUED | OUTPATIENT
Start: 2023-09-08 | End: 2023-09-08 | Stop reason: HOSPADM

## 2023-09-08 RX ORDER — HYDRALAZINE HYDROCHLORIDE 20 MG/ML
5 INJECTION INTRAMUSCULAR; INTRAVENOUS
Status: DISCONTINUED | OUTPATIENT
Start: 2023-09-08 | End: 2023-09-08 | Stop reason: HOSPADM

## 2023-09-08 RX ORDER — EPHEDRINE SULFATE 50 MG/ML
5 INJECTION, SOLUTION INTRAVENOUS ONCE AS NEEDED
Status: DISCONTINUED | OUTPATIENT
Start: 2023-09-08 | End: 2023-09-08 | Stop reason: HOSPADM

## 2023-09-08 RX ORDER — BUPIVACAINE HYDROCHLORIDE AND EPINEPHRINE 5; 5 MG/ML; UG/ML
INJECTION, SOLUTION EPIDURAL; INTRACAUDAL; PERINEURAL AS NEEDED
Status: DISCONTINUED | OUTPATIENT
Start: 2023-09-08 | End: 2023-09-08 | Stop reason: HOSPADM

## 2023-09-08 RX ORDER — SODIUM CHLORIDE, SODIUM LACTATE, POTASSIUM CHLORIDE, CALCIUM CHLORIDE 600; 310; 30; 20 MG/100ML; MG/100ML; MG/100ML; MG/100ML
9 INJECTION, SOLUTION INTRAVENOUS CONTINUOUS
Status: DISCONTINUED | OUTPATIENT
Start: 2023-09-08 | End: 2023-09-08 | Stop reason: HOSPADM

## 2023-09-08 RX ORDER — FENTANYL CITRATE 50 UG/ML
25 INJECTION, SOLUTION INTRAMUSCULAR; INTRAVENOUS
Status: DISCONTINUED | OUTPATIENT
Start: 2023-09-08 | End: 2023-09-08 | Stop reason: HOSPADM

## 2023-09-08 RX ORDER — OXYCODONE HYDROCHLORIDE AND ACETAMINOPHEN 5; 325 MG/1; MG/1
1 TABLET ORAL EVERY 8 HOURS PRN
Qty: 8 TABLET | Refills: 0 | Status: SHIPPED | OUTPATIENT
Start: 2023-09-08 | End: 2023-09-08 | Stop reason: SDUPTHER

## 2023-09-08 RX ORDER — PROPOFOL 10 MG/ML
VIAL (ML) INTRAVENOUS AS NEEDED
Status: DISCONTINUED | OUTPATIENT
Start: 2023-09-08 | End: 2023-09-08 | Stop reason: SURG

## 2023-09-08 RX ORDER — FAMOTIDINE 10 MG/ML
20 INJECTION, SOLUTION INTRAVENOUS ONCE
Status: COMPLETED | OUTPATIENT
Start: 2023-09-08 | End: 2023-09-08

## 2023-09-08 RX ORDER — OXYCODONE HYDROCHLORIDE AND ACETAMINOPHEN 5; 325 MG/1; MG/1
1 TABLET ORAL EVERY 8 HOURS PRN
Qty: 8 TABLET | Refills: 0 | Status: SHIPPED | OUTPATIENT
Start: 2023-09-08

## 2023-09-08 RX ORDER — SODIUM CHLORIDE 0.9 % (FLUSH) 0.9 %
3 SYRINGE (ML) INJECTION EVERY 12 HOURS SCHEDULED
Status: DISCONTINUED | OUTPATIENT
Start: 2023-09-08 | End: 2023-09-08 | Stop reason: HOSPADM

## 2023-09-08 RX ORDER — HYDROCODONE BITARTRATE AND ACETAMINOPHEN 7.5; 325 MG/1; MG/1
1 TABLET ORAL EVERY 4 HOURS PRN
Status: DISCONTINUED | OUTPATIENT
Start: 2023-09-08 | End: 2023-09-08 | Stop reason: HOSPADM

## 2023-09-08 RX ORDER — IPRATROPIUM BROMIDE AND ALBUTEROL SULFATE 2.5; .5 MG/3ML; MG/3ML
3 SOLUTION RESPIRATORY (INHALATION) ONCE AS NEEDED
Status: DISCONTINUED | OUTPATIENT
Start: 2023-09-08 | End: 2023-09-08 | Stop reason: HOSPADM

## 2023-09-08 RX ORDER — LABETALOL 100 MG/1
100 TABLET, FILM COATED ORAL NIGHTLY
COMMUNITY

## 2023-09-08 RX ORDER — LABETALOL HYDROCHLORIDE 5 MG/ML
5 INJECTION, SOLUTION INTRAVENOUS
Status: DISCONTINUED | OUTPATIENT
Start: 2023-09-08 | End: 2023-09-08 | Stop reason: HOSPADM

## 2023-09-08 RX ORDER — MIDAZOLAM HYDROCHLORIDE 1 MG/ML
1 INJECTION INTRAMUSCULAR; INTRAVENOUS
Status: DISCONTINUED | OUTPATIENT
Start: 2023-09-08 | End: 2023-09-08 | Stop reason: HOSPADM

## 2023-09-08 RX ORDER — ALBUTEROL SULFATE 2.5 MG/3ML
2.5 SOLUTION RESPIRATORY (INHALATION) ONCE
Status: COMPLETED | OUTPATIENT
Start: 2023-09-08 | End: 2023-09-08

## 2023-09-08 RX ORDER — FENTANYL CITRATE 50 UG/ML
50 INJECTION, SOLUTION INTRAMUSCULAR; INTRAVENOUS ONCE AS NEEDED
Status: DISCONTINUED | OUTPATIENT
Start: 2023-09-08 | End: 2023-09-08 | Stop reason: HOSPADM

## 2023-09-08 RX ORDER — SUCCINYLCHOLINE CHLORIDE 20 MG/ML
INJECTION INTRAMUSCULAR; INTRAVENOUS AS NEEDED
Status: DISCONTINUED | OUTPATIENT
Start: 2023-09-08 | End: 2023-09-08 | Stop reason: SURG

## 2023-09-08 RX ORDER — CELECOXIB 200 MG/1
200 CAPSULE ORAL ONCE
Status: COMPLETED | OUTPATIENT
Start: 2023-09-08 | End: 2023-09-08

## 2023-09-08 RX ORDER — FENTANYL CITRATE 50 UG/ML
INJECTION, SOLUTION INTRAMUSCULAR; INTRAVENOUS AS NEEDED
Status: DISCONTINUED | OUTPATIENT
Start: 2023-09-08 | End: 2023-09-08 | Stop reason: SURG

## 2023-09-08 RX ORDER — HYDROCODONE BITARTRATE AND ACETAMINOPHEN 5; 325 MG/1; MG/1
1 TABLET ORAL ONCE AS NEEDED
Status: DISCONTINUED | OUTPATIENT
Start: 2023-09-08 | End: 2023-09-08 | Stop reason: HOSPADM

## 2023-09-08 RX ORDER — SODIUM CHLORIDE 0.9 % (FLUSH) 0.9 %
3-10 SYRINGE (ML) INJECTION AS NEEDED
Status: DISCONTINUED | OUTPATIENT
Start: 2023-09-08 | End: 2023-09-08 | Stop reason: HOSPADM

## 2023-09-08 RX ORDER — DIPHENHYDRAMINE HYDROCHLORIDE 50 MG/ML
12.5 INJECTION INTRAMUSCULAR; INTRAVENOUS
Status: DISCONTINUED | OUTPATIENT
Start: 2023-09-08 | End: 2023-09-08 | Stop reason: HOSPADM

## 2023-09-08 RX ORDER — LIDOCAINE HYDROCHLORIDE 10 MG/ML
0.5 INJECTION, SOLUTION INFILTRATION; PERINEURAL ONCE AS NEEDED
Status: DISCONTINUED | OUTPATIENT
Start: 2023-09-08 | End: 2023-09-08 | Stop reason: HOSPADM

## 2023-09-08 RX ORDER — ACETAMINOPHEN 500 MG
500 TABLET ORAL ONCE
Status: COMPLETED | OUTPATIENT
Start: 2023-09-08 | End: 2023-09-08

## 2023-09-08 RX ORDER — SCOLOPAMINE TRANSDERMAL SYSTEM 1 MG/1
1 PATCH, EXTENDED RELEASE TRANSDERMAL ONCE
Status: DISCONTINUED | OUTPATIENT
Start: 2023-09-08 | End: 2023-09-08 | Stop reason: HOSPADM

## 2023-09-08 RX ORDER — ACETAMINOPHEN 500 MG
1000 TABLET ORAL ONCE
Status: COMPLETED | OUTPATIENT
Start: 2023-09-08 | End: 2023-09-08

## 2023-09-08 RX ORDER — DROPERIDOL 2.5 MG/ML
0.62 INJECTION, SOLUTION INTRAMUSCULAR; INTRAVENOUS
Status: DISCONTINUED | OUTPATIENT
Start: 2023-09-08 | End: 2023-09-08 | Stop reason: HOSPADM

## 2023-09-08 RX ORDER — FLUMAZENIL 0.1 MG/ML
0.2 INJECTION INTRAVENOUS AS NEEDED
Status: DISCONTINUED | OUTPATIENT
Start: 2023-09-08 | End: 2023-09-08 | Stop reason: HOSPADM

## 2023-09-08 RX ORDER — PROMETHAZINE HYDROCHLORIDE 25 MG/1
25 SUPPOSITORY RECTAL ONCE AS NEEDED
Status: DISCONTINUED | OUTPATIENT
Start: 2023-09-08 | End: 2023-09-08 | Stop reason: HOSPADM

## 2023-09-08 RX ORDER — ROCURONIUM BROMIDE 10 MG/ML
INJECTION, SOLUTION INTRAVENOUS AS NEEDED
Status: DISCONTINUED | OUTPATIENT
Start: 2023-09-08 | End: 2023-09-08 | Stop reason: SURG

## 2023-09-08 RX ADMIN — SUGAMMADEX 400 MG: 100 INJECTION, SOLUTION INTRAVENOUS at 17:05

## 2023-09-08 RX ADMIN — ACETAMINOPHEN 500 MG: 500 TABLET ORAL at 15:49

## 2023-09-08 RX ADMIN — ACETAMINOPHEN 500 MG: 500 TABLET ORAL at 15:37

## 2023-09-08 RX ADMIN — HYDROMORPHONE HYDROCHLORIDE 0.25 MG: 1 INJECTION, SOLUTION INTRAMUSCULAR; INTRAVENOUS; SUBCUTANEOUS at 18:20

## 2023-09-08 RX ADMIN — MIDAZOLAM 1 MG: 1 INJECTION INTRAMUSCULAR; INTRAVENOUS at 15:37

## 2023-09-08 RX ADMIN — SCOPALAMINE 1 PATCH: 1 PATCH, EXTENDED RELEASE TRANSDERMAL at 15:37

## 2023-09-08 RX ADMIN — LIDOCAINE HYDROCHLORIDE 100 MG: 20 INJECTION, SOLUTION INFILTRATION; PERINEURAL at 16:20

## 2023-09-08 RX ADMIN — CELECOXIB 200 MG: 200 CAPSULE ORAL at 15:37

## 2023-09-08 RX ADMIN — SODIUM CHLORIDE, POTASSIUM CHLORIDE, SODIUM LACTATE AND CALCIUM CHLORIDE 9 ML/HR: 600; 310; 30; 20 INJECTION, SOLUTION INTRAVENOUS at 14:56

## 2023-09-08 RX ADMIN — FENTANYL CITRATE 25 MCG: 50 INJECTION, SOLUTION INTRAMUSCULAR; INTRAVENOUS at 17:52

## 2023-09-08 RX ADMIN — SUCCINYLCHOLINE CHLORIDE 200 MG: 20 INJECTION, SOLUTION INTRAMUSCULAR; INTRAVENOUS; PARENTERAL at 16:21

## 2023-09-08 RX ADMIN — FENTANYL CITRATE 50 MCG: 50 INJECTION, SOLUTION INTRAMUSCULAR; INTRAVENOUS at 17:08

## 2023-09-08 RX ADMIN — FENTANYL CITRATE 25 MCG: 50 INJECTION, SOLUTION INTRAMUSCULAR; INTRAVENOUS at 17:58

## 2023-09-08 RX ADMIN — ROCURONIUM BROMIDE 45 MG: 10 INJECTION, SOLUTION INTRAVENOUS at 16:25

## 2023-09-08 RX ADMIN — HYDROMORPHONE HYDROCHLORIDE 0.25 MG: 1 INJECTION, SOLUTION INTRAMUSCULAR; INTRAVENOUS; SUBCUTANEOUS at 18:05

## 2023-09-08 RX ADMIN — ROCURONIUM BROMIDE 5 MG: 10 INJECTION, SOLUTION INTRAVENOUS at 16:19

## 2023-09-08 RX ADMIN — HYDROCODONE BITARTRATE AND ACETAMINOPHEN 1 TABLET: 7.5; 325 TABLET ORAL at 18:43

## 2023-09-08 RX ADMIN — PROPOFOL 300 MG: 10 INJECTION, EMULSION INTRAVENOUS at 16:20

## 2023-09-08 RX ADMIN — ALBUTEROL SULFATE 2.5 MG: 2.5 SOLUTION RESPIRATORY (INHALATION) at 18:11

## 2023-09-08 RX ADMIN — FAMOTIDINE 20 MG: 10 INJECTION, SOLUTION INTRAVENOUS at 15:35

## 2023-09-08 NOTE — ANESTHESIA PREPROCEDURE EVALUATION
Anesthesia Evaluation     Patient summary reviewed and Nursing notes reviewed   history of anesthetic complications:  PONV  NPO Solid Status: > 8 hours  NPO Liquid Status: > 2 hours           Airway   Mallampati: II  TM distance: >3 FB  Neck ROM: full  Dental - normal exam     Pulmonary - negative pulmonary ROS and normal exam    breath sounds clear to auscultation  Cardiovascular - normal exam    ECG reviewed  Rhythm: regular  Rate: normal    (+) hypertension  (-) angina, orthopnea, PND, DELGADO    ROS comment: NORMAL ECG -  Sinus rhythm  No change from prior tracing      Neuro/Psych  (+) headaches (Migraine), syncope, psychiatric history (Borderline personality disorder) Anxiety, Depression, ADHD and PTSD  GI/Hepatic/Renal/Endo    (+) morbid obesity, GERD, diabetes mellitus (Prediabetes), thyroid problem hypothyroidism    Musculoskeletal (-) negative ROS    Abdominal    Substance History - negative use     OB/GYN negative ob/gyn ROS         Other - negative ROS                       Anesthesia Plan    ASA 3     general     intravenous induction     Anesthetic plan, risks, benefits, and alternatives have been provided, discussed and informed consent has been obtained with: patient.      CODE STATUS:          Rx request for gabapentin denied due to patient not being seen in this office since June 2021

## 2023-09-08 NOTE — ANESTHESIA PROCEDURE NOTES
Airway  Urgency: elective    Date/Time: 9/8/2023 4:22 PM  End Time:9/8/2023 4:22 PM  Airway not difficult    General Information and Staff    Patient location during procedure: OR  Anesthesiologist: Morteza Vaca MD    Indications and Patient Condition  Indications for airway management: airway protection    Preoxygenated: yes  Mask difficulty assessment: 0 - not attempted    Final Airway Details  Final airway type: endotracheal airway      Successful airway: ETT  Cuffed: yes   Successful intubation technique: direct laryngoscopy  Endotracheal tube insertion site: oral  Blade: Ortiz  Blade size: 2  ETT size (mm): 7.0  Cormack-Lehane Classification: grade I - full view of glottis  Placement verified by: chest auscultation and capnometry   Number of attempts at approach: 1  Assessment: lips, teeth, and gum same as pre-op and atraumatic intubation

## 2023-09-08 NOTE — DISCHARGE INSTRUCTIONS
YOU WILL BE ON PELVIC REST FOR THE NEXT 2 WEEKS OR UNTIL SPECIFIED BY YOUR PHYSICIAN. PELVIC REST INCLUDES:  Avoiding long periods of standing.  Avoiding heavy lifting, pushing or pulling.  DO NOT lift anything heavier than 10 pounds (4.5 kg)  DO NOT douche, use tampons, or have sex (intercourse) for 2 weeks after the procedure.

## 2023-09-08 NOTE — H&P
[facility], [facility]   HISTORY AND PHYSICAL    Patient Name:Beth Gaviria  : 2000  MRN: [id number]  Primary Care Physician: Sneha Cota APRN  Date of admission: 2023    Subjective   Subjective     Chief Complaint ; no more babies     History of Present Illness   Beth Gaviria is a 23 y.o. adult    Desires permanent voluntary sterilization - papers signed 23  Also wants removal of her nexplanon     Review of Systems   Constitutional:  Negative for chills and fever.   Gastrointestinal:  Negative for abdominal pain.   Genitourinary:  Negative for dysuria, menstrual problem, pelvic pain, vaginal bleeding and vaginal discharge.   All other systems reviewed and are negative.      Personal History     Past Medical History:   Diagnosis Date    ADHD (attention deficit hyperactivity disorder)     Anemia     Anxiety     Borderline personality disorder     Chronic hypertension     not issues currently, previously on labatelol    COVID 2021    Depression     Dichorionic diamniotic twin pregnancy in second trimester 2022    Esophageal spasm     GERD (gastroesophageal reflux disease)     Gestational diabetes 2018    Gestational hypertension     Head injury     2014 Mild Concussion    Hypothyroid in pregnancy, antepartum, third trimester 2022    Insomnia     Migraine, chronic, without aura     Nausea     PONV (postoperative nausea and vomiting)     Preeclampsia 2018    PTSD (post-traumatic stress disorder) 2023    Varicella        Past Surgical History:   Procedure Laterality Date     SECTION       SECTION N/A 2022    Procedure:  SECTION REPEAT;  Surgeon: Luis Alberto Asher MD;  Location: [facility] LABOR DELIVERY;  Service: Obstetrics/Gynecology;  Laterality: N/A;    COLONOSCOPY      ENDOSCOPY      LAPAROSCOPIC CHOLECYSTECTOMY  2018    TONSILLECTOMY      WISDOM TOOTH EXTRACTION         Family History: Beth Gaviria's family history includes Breast  cancer in Beth Gaviria's maternal grandmother; Cancer in Beth Gaviria's father and maternal grandmother; Coronary artery disease in Beth Gaviria's mother; Deep vein thrombosis in Beth Gaviria's maternal uncle; Diabetes in Beth Stevensons father and mother; Heart attack in Beth Gaviria's father and paternal grandmother; Heart disease in Beth Gaviria's mother; Hypertension in Beth Stevensons father and mother; Stroke in Beth Gaviria's paternal grandmother.     Social History: Beth Gaviria  reports that Beth Gaviria quit smoking about 5 years ago. Beth Stevensons smoking use included electronic cigarette and cigarettes. Beth Gaviria has never used smokeless tobacco. Beth Gaviria reports that Beth Gaviria does not currently use alcohol. Beth Gaviria reports that Beth Gaviria does not use drugs.    Home Medications:  Biotin, Chlorhexidine Gluconate Cloth, acetaminophen, lamoTRIgine, and ondansetron ODT    Allergies:  Beth Gaviria is allergic to buspirone, lamotrigine, oxcarbazepine, other, amphetamine-dextroamphetamine, and metoclopramide.    Objective    Objective     Vitals:         Physical Exam  Vitals reviewed. Exam conducted with a chaperone present.   Constitutional:       General: Beth Gaviria is not in acute distress.     Appearance: Beth Gaviria is well-developed. Beth Gaviria is obese.   HENT:      Head: Normocephalic and atraumatic.   Eyes:      General:         Right eye: No discharge.         Left eye: No discharge.      Conjunctiva/sclera: Conjunctivae normal.   Neck:      Thyroid: No thyromegaly.   Cardiovascular:      Rate and Rhythm: Normal rate and regular rhythm.      Heart sounds: Normal heart sounds. No murmur heard.  Pulmonary:      Effort: Pulmonary effort is normal. No respiratory distress.      Breath sounds: Normal breath sounds.   Abdominal:      General: Bowel sounds are normal. There is no distension.      Palpations: Abdomen is soft.      Tenderness: There  is no abdominal tenderness.   Musculoskeletal:         General: No tenderness. Normal range of motion.      Cervical back: Normal range of motion and neck supple.      Right lower leg: No edema.      Left lower leg: No edema.   Lymphadenopathy:      Cervical: No cervical adenopathy.   Skin:     General: Skin is warm and dry.      Findings: No rash.   Neurological:      General: No focal deficit present.      Mental Status: Beth Gaviria is alert and oriented to person, place, and time.   Psychiatric:         Behavior: Behavior normal.         Thought Content: Thought content normal.         Judgment: Judgment normal.      Lab Results   Component Value Date    WBC 15.09 (H) 2023    HGB 13.1 2023    HCT 41.4 2023    MCV 75.0 (L) 2023     (H) 2023     HCG Negative on 23    Lab Results   Component Value Date    GLUCOSE 121 (H) 2023    CALCIUM 9.6 2023     2023    K 3.9 2023    CO2 24.7 2023     2023    BUN 10 2023    CREATININE 0.79 2023    EGFRRESULT 133 2022    EGFR 106.6 2023    BCR 12.7 2023    ANIONGAP 12.3 2023         Assessment & Plan   Assessment / Plan     Brief Patient Summary:  Beth Gaviria is a 23 y.o. adult    1) Desires permanent voluntary sterilization with bilateral salpingectomy   2) Desires removal of nexplanon contraceptive     Active Hospital Problems:  Active Hospital Problems    Diagnosis     **Encounter for sterilization      Plan:   C bilateral salpingectomy   Nexplanon removal.   Various options discussed including natural family planning and abstinence. Pt still wishes for permanent sterilization.The risks, benefits, and alternatives were discussed including surgery risks such as failure of sterilization method with future pregnancy, ectopic pregnancy, bleeding, infection, scar, pain, wound breakdown, conversion to open approach, GI/ injury, DVT, nerve  damage, organ failure, anesthesia complications, and death. Discussed preop and typical recovery. Discussed risk of future pregnancy and regret. All questions answered.       DVT prophylaxis:  No DVT prophylaxis order currently exists.    Luis Alberto Asher MD  9/8/2023    14:37 EDT

## 2023-09-08 NOTE — ANESTHESIA POSTPROCEDURE EVALUATION
Patient: Beth Gaviria    Procedure Summary       Date: 09/08/23 Room / Location: [facility] OR [address] [place] [address] MAIN OR    Anesthesia Start: 1613 Anesthesia Stop: 1736    Procedure: BILATERAL LAPAROSCOPIC SALPINGECTOMY AND REMOVAL OF NEXPLANON IMPLANT (Abdomen) Diagnosis:       Encounter for sterilization      (Encounter for sterilization [Z30.2])    Surgeons: Luis Alberto Asher MD Provider: Morteza Vaca MD    Anesthesia Type: general ASA Status: 3            Anesthesia Type: general    Vitals  Vitals Value Taken Time   /64 09/08/23 1845   Temp 36.4 °C (97.5 °F) 09/08/23 1730   Pulse 105 09/08/23 1852   Resp 16 09/08/23 1845   SpO2 92 % 09/08/23 1852   Vitals shown include unvalidated device data.        Post Anesthesia Care and Evaluation    Patient location during evaluation: bedside  Patient participation: complete - patient participated  Level of consciousness: awake and alert  Pain management: adequate    Airway patency: patent  Anesthetic complications: No anesthetic complications    Cardiovascular status: acceptable  Respiratory status: acceptable  Hydration status: acceptable    Comments: /62   Pulse 97   Temp 36.4 °C (97.5 °F) (Oral)   Resp 16   LMP 08/24/2023   SpO2 96%

## 2023-09-08 NOTE — OP NOTE
Laparoscopy bilateral salpingectomy and removal of contraceptive capsule in left Arm Procedure Note    Indications: The patient is a 23 y.o. adult who desires sterilization and her paperwork is in order.     Pre-operative Diagnosis: 1) Desires permanent sterilization                                               2) remove nexplanon    Post-operative Diagnosis: 1) Desires permanent sterilization                                                2) remove nexplanon    Surgeon: Luis Alberto Asher MD     Assistants: none     Anesthesia: General endotracheal anesthesia    ASA Class:  per anesthesia       Procedure Details   The patient was seen in the Holding Room. The risks, benefits, complications, treatment options, and expected outcomes were discussed with the patient. The possibilities of reaction to medication, pulmonary aspiration, perforation of viscus, bleeding, recurrent infection, the need for additional procedures, failure to diagnose a condition, and creating a complication requiring transfusion or operation were discussed with the patient. The patient concurred with the proposed plan, giving informed consent. The patient was taken to the Operating Room, identified as Beth Gaviria and the procedure verified as laparoscopic tubal cautery. A Time Out was held and the above information confirmed.    After induction of general anesthesia, the patient was placed in modified dorsal lithotomy position where she was prepped, draped, and catheterized in the normal, sterile fashion.    The cervix was visualized and an intrauterine manipulator was placed.    The infraumbilical site was infiltrated with 10 cc of 0.5% marcaine with epi, then a 1 cm umbilical incision was then performed. This was cut down to the fascia.  Once the fascia was incised and entered, stay sutures of 0-Vicryl were placed to secure the Hasan Trocar. A 5 mm port was placed at the level of the umbilicus along the left midclavicular line under  direct visualization with 5 cc of the 05.% marcaine with epi. The same thing was done suprapubic through her midline portion of her  scar.  Once pneumoperitoneum was sufficient and appropriate anatomy identified, I proceeded with the tubal.     Using the enseal device we started at the distal end of the left tube and cauterized and cut along the mesosalpinx to the level of the cornua and came across the left tube at the medial portion.  The same thing was done on the right and the tubes were removed through the midline port and sent for pathology.       Inspection of the remaining abdominal and pelvic anatomy was normal and the surgical sites hemostatic.      Following the procedure the umbilical sheath was removed after intra-abdominal carbon dioxide was expressed. The incision was closed on the fascial layer with 0-Vicryl in a running fashion and subcuticular sutures of 4-0 Vicryl. The other two sites were closed as well just at the skin with the 4-0 vicryl subcuticular suture.        Attention was turned to the nexplanon device.       Able to easily palpate the device in the nondominant left arm. Additional imaging was not required. The device feels freely mobile and easily accessible. She was placed in the examining table in a supine position with her arm flexed at the elbow and hand under her head. The skin over this site is prepped with Betadine. 2-3 mL of 0.5% Marcaine with epinephrine was injected.    Downward pressure was applied on the proximal end of the implant nearest the axilla, and a 2-3 mm incision was made in a longitudinal direction of the arm at the tip of the implant closest to the elbow. The implant was then pushed gently toward the incision until the tip was visible. The fibrous capsule was opened with blunt dissection using a hemostat. The implant was grasp with a hemostat and was easily removed intact. It measured a  full 4 cm in length.    The skin was cleansed and dried. A  Steri-Strip was applied. The arm was gently wrapped with Kerlex gauze. The patient had normal strength and sensation in her left extremity. There was no significant bleeding. There were no complications. The patient was advised about proper care of the dressings. She was advised to call or return for complications such as fever, signs of infection, increased pain, or any other concerns.    Warning signs, limitations and expectations reviewed.     The intrauterine manipulator was then removed, cervix inspected and seen to be hemostatic.      Instrument, sponge, and needle counts were correct prior to abdominal closure and at the conclusion of the case.     Findings:  Normal pelvic anatomy     Estimated Blood Loss:   25 cc           Drains: None                   Specimens: bilateral tubes                 Complications:  None; patient tolerated the procedure well.           Disposition: PACU - hemodynamically stable.           Condition: stable    Luis Alberto Asher MD  9/8/2023  17:21 EDT

## 2023-09-08 NOTE — PROGRESS NOTES
Called by check out staff.   Rx sent to pharmacy that is now closed.   New Rx sent to new pharmacy per staff listed in Epic.     Luis Alberto Asher MD  9/8/2023  19:36 EDT

## 2023-09-12 LAB
LAB AP CASE REPORT: NORMAL
PATH REPORT.FINAL DX SPEC: NORMAL
PATH REPORT.GROSS SPEC: NORMAL

## 2023-09-19 ENCOUNTER — TELEPHONE (OUTPATIENT)
Dept: OBSTETRICS AND GYNECOLOGY | Facility: CLINIC | Age: 23
End: 2023-09-19
Payer: COMMERCIAL

## 2023-09-19 NOTE — TELEPHONE ENCOUNTER
Caller: DEANNE    Relationship: [facility] PROVIDER      What form or medical record are you requesting: SIGNED STERILIZATION FORM    Who is requesting this form or medical record from you: [facility]    How would you like to receive the form or medical records (pick-up, mail, fax): FAX  If fax, what is the fax number: [phone]        Additional notes: DEANNE CALLED INTO THE PRACTICE REQUESTING PATIENTS STERILIZATION CONSENT FORM BE SIGNED AND FAXED OVER -[phone].

## 2023-09-26 ENCOUNTER — TELEPHONE (OUTPATIENT)
Dept: OBSTETRICS AND GYNECOLOGY | Facility: CLINIC | Age: 23
End: 2023-09-26

## 2023-09-26 NOTE — TELEPHONE ENCOUNTER
Caller: DEANNE BILLY/ [facility] call back number: [phone]    What form or medical record are you requesting: STERILIZATION CONSENT FORM    How would you like to receive the form or medical records (pick-up, mail, fax): FAX  If fax, what is the fax number: [phone]

## 2023-10-11 NOTE — PATIENT INSTRUCTIONS
Physical Therapy Discharge    Date: 10/11/2023  Total Number of Visits: 10  Referred by: Sunny Grubbs MD  Medical Diagnosis (from order):   Diagnosis Information      Diagnosis    R29.898 (ICD-10-CM) - Bilateral leg weakness                Phoned patient who stated at this time she does not wish to continue with PT services.  Status of goals: per status in last daily treatment note       HOOS, KOOS, LEFS, NDI, Oswestry, QDASH enter via [employer]Outcome and rehab smart/dot phrase     Plan of Care:  Current psychiatric medications: lexapro 20 mg/day. Medication Changes today: Yes, cut lexapro back to 10 mg/day due to side effects, trintellix 5 mg/day ordered, will most likely require a prior authorization, don't stop lexapro until new med is filled.  Continue counseling weekly with melissa melton  Reach out to cardiologist for clearance for focalin due to heart history, pt reports POTS is suspected    General Instructions:  Patient to monitor for side effects, worsening symptoms, and/or improvement, report to PMHNP Hua velazquez.  If a sooner appointment is needed please call office at number listed below to schedule.  Please request refills through your pharmacy prior to reaching out to office or through [employer]  Please give office staff (1) week to schedule a referral, if you have not heard anything around that time call office and ask to speak to outgoing referral .    Aron Cobos   Psychiatric Mental Health Nurse Practitioner (PMHNP)  [address] [place] [address]  P: [phone]  F: [phone]

## 2024-08-11 ENCOUNTER — HOSPITAL ENCOUNTER (EMERGENCY)
Facility: HOSPITAL | Age: 24
Discharge: HOME | End: 2024-08-11
Attending: EMERGENCY MEDICINE | Admitting: EMERGENCY MEDICINE
Payer: COMMERCIAL

## 2024-08-11 VITALS
BODY MASS INDEX: 44.39 KG/M2 | DIASTOLIC BLOOD PRESSURE: 91 MMHG | HEART RATE: 89 BPM | RESPIRATION RATE: 15 BRPM | TEMPERATURE: 98.3 F | HEIGHT: 64 IN | OXYGEN SATURATION: 96 % | SYSTOLIC BLOOD PRESSURE: 130 MMHG | WEIGHT: 260 LBS

## 2024-08-11 DIAGNOSIS — M54.42 ACUTE MIDLINE LOW BACK PAIN WITH BILATERAL SCIATICA: Primary | ICD-10-CM

## 2024-08-11 DIAGNOSIS — M54.41 ACUTE MIDLINE LOW BACK PAIN WITH BILATERAL SCIATICA: Primary | ICD-10-CM

## 2024-08-11 PROCEDURE — 99282 EMERGENCY DEPT VISIT SF MDM: CPT

## 2024-08-11 RX ORDER — METHYLPREDNISOLONE 4 MG/1
TABLET ORAL
Qty: 21 TABLET | Refills: 0 | Status: SHIPPED | OUTPATIENT
Start: 2024-08-11

## 2024-08-11 RX ORDER — SODIUM CHLORIDE 0.9 % (FLUSH) 0.9 %
10 SYRINGE (ML) INJECTION AS NEEDED
Status: DISCONTINUED | OUTPATIENT
Start: 2024-08-11 | End: 2024-08-11 | Stop reason: HOSPADM

## 2024-08-11 RX ORDER — MELOXICAM 15 MG/1
15 TABLET ORAL DAILY
Qty: 7 TABLET | Refills: 0 | Status: SHIPPED | OUTPATIENT
Start: 2024-08-11 | End: 2024-08-18

## 2024-08-11 NOTE — DISCHARGE INSTRUCTIONS
Take medications as prescribed.  Do not take the Mobic at the same time you are taking other anti-inflammatories.  The neurosurgery office will call you to schedule appointment to be seen.  Return immediately for any incontinence, sudden weakness of one leg.

## 2024-08-11 NOTE — ED NOTES
Pt stated due to not having a ride home she would like to return for treatment on Friday. MD at bedside during this conversation and agreed with pt's plan and stated would prescribe medications.

## 2024-08-11 NOTE — ED PROVIDER NOTES
EMERGENCY DEPARTMENT ENCOUNTER  Room Number:    PCP: Sneha Cota APRN  Independent Historians: Patient and Family      HPI:  Chief Complaint: had concerns including Back Pain.     A complete HPI/ROS/PMH/PSH/SH/FH are unobtainable due to: None    Chronic or social conditions impacting patient care (Social Determinants of Health): None      Context: Beth Gaviria is a 24 y.o. adult with a medical history of depression, anxiety, borderline personality disorder, COVID, chronic migraines who presents to the ED c/o acute back pain with lower extremity neurologic symptoms.  The patient reports that 1 week ago she fell carrying a heavy box.  She reports she injured her back.  She states she went to [facility].  She states they did x-rays.  She reports she has had persistent lack of sensation to her bilateral legs since then.  She reports a burning sensation down both of her legs.  She denies any incontinence.  She denies any weakness.  She states she cannot feel her legs.  She states they told her that if those symptoms persist that she would need to return to the emergency room for an MRI.  She reports diffuse thoracic and lumbar spine Tenderness.  She reports she has been taking anti-inflammatories and Robaxin without improvement.      Review of prior external notes (non-ED) -and- Review of prior external test results outside of this encounter:  Thoracic and lumbar x-rays dated 2024 showed no acute fracture    Prescription drug monitoring program review:         PAST MEDICAL HISTORY  Active Ambulatory Problems     Diagnosis Date Noted    H/O gestational diabetes mellitus, not currently pregnant 2020    Menorrhagia with regular cycle 2018    Prediabetes 2020    Seasonal allergies 2020    Morbid obesity with BMI of 45.0-49.9, adult 2020    Previous  delivery, antepartum 2022    Pregnancy 2022    Twin gestation, dichorionic/diamniotic (two  placentae, two amniotic sacs) 2022    Gestational diabetes mellitus (GDM) controlled on oral hypoglycemic drug, antepartum 2022    Pre-existing essential hypertension complicating pregnancy, third trimester 2022    Dichorionic diamniotic twin pregnancy in third trimester 2022    Chronic hypertension with exacerbation during pregnancy in third trimester 2022    Diet controlled gestational diabetes mellitus (GDM) in third trimester 2022    Macrosomia of fetus affecting management of mother in third trimester 2022    Polyhydramnios in third trimester 2022    Maternal morbid obesity in third trimester, antepartum 2022    Previous  delivery, antepartum 2022    Hypothyroid in pregnancy, antepartum, third trimester 2022    Decreased fetal movements in third trimester 2022    S/P  section 2022    Attention deficit hyperactivity disorder (ADHD) 2022    Essential hypertension 2022    History of pre-eclampsia 2022    Syncope 2022    DIETER (generalized anxiety disorder) 2023    PTSD (post-traumatic stress disorder) 2023    Unspecified mood (affective) disorder 2023    Borderline personality disorder 2023    Acid reflux 2023    High risk medication use 2023    Dissociation 2023    Encounter for sterilization 2023    Abdominal pain, epigastric 2011    RUQ abdominal pain 2015    Back pain 2023    Difficult or painful urination 2023    Increased frequency of urination 2023     Resolved Ambulatory Problems     Diagnosis Date Noted    Dichorionic diamniotic twin pregnancy in second trimester 2022     Past Medical History:   Diagnosis Date    ADHD (attention deficit hyperactivity disorder)     Anemia     Anxiety     Chronic hypertension     COVID 2021    Depression     Esophageal spasm     GERD (gastroesophageal reflux disease)      Gestational diabetes 2018    Gestational hypertension     Head injury     Insomnia     Migraine, chronic, without aura     Nausea     PONV (postoperative nausea and vomiting)     Preeclampsia 2018    Varicella          PAST SURGICAL HISTORY  Past Surgical History:   Procedure Laterality Date     SECTION       SECTION N/A 2022    Procedure:  SECTION REPEAT;  Surgeon: Luis Alberto Asher MD;  Location: [facility] LABOR DELIVERY;  Service: Obstetrics/Gynecology;  Laterality: N/A;    COLONOSCOPY      DIAGNOSTIC LAPAROSCOPY N/A 2023    Procedure: BILATERAL LAPAROSCOPIC SALPINGECTOMY AND REMOVAL OF NEXPLANON IMPLANT;  Surgeon: Luis Alberto Asher MD;  Location: [facility] MAIN OR;  Service: Obstetrics/Gynecology;  Laterality: N/A;    ENDOSCOPY      LAPAROSCOPIC CHOLECYSTECTOMY  2018    TONSILLECTOMY      WISDOM TOOTH EXTRACTION           FAMILY HISTORY  Family History   Problem Relation Age of Onset    Diabetes Mother     Heart disease Mother     Hypertension Mother     Coronary artery disease Mother     Diabetes Father     Cancer Father     Hypertension Father     Heart attack Father     Deep vein thrombosis Maternal Uncle     Cancer Maternal Grandmother         breast    Breast cancer Maternal Grandmother     Heart attack Paternal Grandmother     Stroke Paternal Grandmother     Ovarian cancer Neg Hx     Uterine cancer Neg Hx     Colon cancer Neg Hx     Pulmonary embolism Neg Hx     Malig Hyperthermia Neg Hx          SOCIAL HISTORY  Social History     Socioeconomic History    Marital status: Single    Number of children: 1   Tobacco Use    Smoking status: Former     Current packs/day: 0.00     Types: Electronic Cigarette, Cigarettes     Start date: 10/31/2015     Quit date: 10/31/2017     Years since quittin.7    Smokeless tobacco: Never    Tobacco comments:     1 pack a week   Vaping Use    Vaping status: Former    Quit date: 10/31/2017    Substances: Nicotine, Flavoring    [facility]ices[facility]  [facility]   Substance and Sexual Activity    Alcohol use: Not Currently    Drug use: Never    Sexual activity: Yes     Partners: Male     Birth control/protection: Nexplanon         ALLERGIES  Buspirone, Lamotrigine, Oxcarbazepine, Other, Amphetamine-dextroamphetamine, and Metoclopramide      REVIEW OF SYSTEMS  Review of Systems  Included in HPI  All systems reviewed and negative except for those discussed in HPI.      PHYSICAL EXAM    I have reviewed the triage vital signs and nursing notes.    ED Triage Vitals   Temp Heart Rate Resp BP SpO2   08/11/24 1619 08/11/24 1619 08/11/24 1619 08/11/24 1621 08/11/24 1619   98.3 °F (36.8 °C) 108 15 136/86 98 %      Temp src Heart Rate Source Patient Position BP Location FiO2 (%)   08/11/24 1619 08/11/24 1619 08/11/24 1619 08/11/24 1619 --   Tympanic Monitor Sitting Right arm        Physical Exam  GENERAL: Awake, alert, no acute distress  SKIN: Warm, dry  HENT: Normocephalic, atraumatic  EYES: no scleral icterus  CV: regular rhythm, regular rate  RESPIRATORY: normal effort, lungs clear  ABDOMEN: soft, nontender, nondistended  MUSCULOSKELETAL: no deformity.  Equal pedal pulses.  She has equal strength in her bilateral lower extremities.  She reports altered sensation to her bilateral lower extremities and reports she only feels heaviness with light touch.  Diffuse tenderness throughout her lower thoracic and diffuse lumbar spines.  NEURO: alert, moves all extremities, follows commands            LAB RESULTS  No results found for this or any previous visit (from the past 24 hour(s)).      RADIOLOGY  No Radiology Exams Resulted Within Past 24 Hours      MEDICATIONS GIVEN IN ER  Medications   sodium chloride 0.9 % flush 10 mL (has no administration in time range)         ORDERS PLACED DURING THIS VISIT:  Orders Placed This Encounter   Procedures    MRI Thoracic Spine Without Contrast    MRI Lumbar Spine Without Contrast    Comprehensive Metabolic Panel    hCG, Serum,  Qualitative    CBC Auto Differential    Ambulatory Referral to Neurosurgery    Insert Peripheral IV    CBC & Differential         OUTPATIENT MEDICATION MANAGEMENT:  Current Facility-Administered Medications Ordered in Epic   Medication Dose Route Frequency Provider Last Rate Last Admin    sodium chloride 0.9 % flush 10 mL  10 mL Intravenous PRN Gabriel Guzman MD         Current Outpatient Medications Ordered in Epic   Medication Sig Dispense Refill    acetaminophen (TYLENOL) 500 MG tablet Take 2 tablets by mouth Every 6 (Six) Hours As Needed for Mild Pain.      Biotin 1000 MCG tablet Take 1,000 mcg by mouth Daily.      Chlorhexidine Gluconate Cloth 2 % pads Apply 1 application  topically.      labetalol (NORMODYNE) 100 MG tablet Take 1 tablet by mouth Every Night.      lamoTRIgine (LaMICtal) 100 MG tablet Take 1 tablet by mouth Every Night.      meloxicam (MOBIC) 15 MG tablet Take 1 tablet by mouth Daily for 7 days. 7 tablet 0    methylPREDNISolone (MEDROL) 4 MG dose pack Take as directed on package instructions. 21 tablet 0    ondansetron ODT (ZOFRAN-ODT) 4 MG disintegrating tablet Place 1 tablet on the tongue Every 8 (Eight) Hours As Needed for Nausea or Vomiting.      oxyCODONE-acetaminophen (PERCOCET) 5-325 MG per tablet Take 1 tablet by mouth Every 8 (Eight) Hours As Needed (Pain). 8 tablet 0         PROCEDURES  Procedures            PROGRESS, DATA ANALYSIS, CONSULTS, AND MEDICAL DECISION MAKING  All labs have been independently interpreted by me.  All radiology studies have been reviewed by me. All EKG's have been independently viewed and interpreted by me.  Discussion below represents my analysis of pertinent findings related to patient's condition, differential diagnosis, treatment plan and final disposition.    Differential diagnosis includes but is not limited to spinal cord injury, degenerative disc disease, malingering, cauda equina syndrome.    Clinical Scores:                   ED Course as of  08/11/24 1724   Sun Aug 11, 2024   1710 The patient is currently deciding whether she is going to stay or not.  I advised her that we would need to admit her to the observation unit to obtain an MRI to rule out spinal cord injury, cord syndrome.  I am not convinced that a spinal cord injury or cord syndrome however the patient's symptoms are concerning.  This is also made more challenging by her psychiatric history. [TR]   1720 I discussed with the patient and family at the bedside.  The patient states she is unwilling to stay for further imaging.  She is having trouble coordinating transportation.  She wants to go home today.  She wants to try different anti-inflammatory and she is already on Robaxin.  Again, she has no incontinence, no weakness.  She only has sensation change bilaterally which is concerning.  I will refer her to neurosurgery as an outpatient.  I will add Medrol Dosepak as well as change her anti-inflammatory to Mobic in the meantime. [TR]      ED Course User Index  [TR] Gabriel Guzman MD             AS OF 17:24 EDT VITALS:    BP - 136/86  HR - 108  TEMP - 98.3 °F (36.8 °C) (Tympanic)  O2 SATS - 98%    COMPLEXITY OF CARE  Admission was considered but after careful review of the patient's presentation, physical examination, diagnostic results, and response to treatment the patient may be safely discharged with outpatient follow-up.      DIAGNOSIS  Final diagnoses:   Acute midline low back pain with bilateral sciatica         DISPOSITION  ED Disposition       ED Disposition   Discharge    Condition   Stable    Comment   --                Please note that portions of this document were completed with a voice recognition program.    Note Disclaimer: At [facility], we believe that sharing information builds trust and better relationships. You are receiving this note because you recently visited [facility]. It is possible you will see health information before a provider has talked with you  about it. This kind of information can be easy to misunderstand. To help you fully understand what it means for your health, we urge you to discuss this note with your provider.         Gabriel Guzman MD  08/11/24 1724       Gabriel Guzman MD  08/11/24 1723     No pertinent family history in first degree relatives

## 2024-08-11 NOTE — ED NOTES
"Patient from home via private vehicle, ambulatory in triage. Patient reports she had a fall last sunday, was seen at [facility] and was told if pain worsened to come back. patient says pain began to worsen yesterday and is now radiating down both legs. states it feels \"burning but at the same time i can't feel my legs\". Denies loss of bowel/bladder   "

## 2024-08-15 ENCOUNTER — TELEPHONE (OUTPATIENT)
Dept: FAMILY MEDICINE CLINIC | Facility: CLINIC | Age: 24
End: 2024-08-15
Payer: COMMERCIAL

## 2024-08-15 NOTE — TELEPHONE ENCOUNTER
Neurosurgery called patient has been referred by the ED.  The x rays states that there are surgical clips in  place but the patient states that they have never had surgery.  The referring office is asking if we know if there has been any surgeries in  the past.  I have check with [facility] Everywhere and past medical records and can not find any records.  [phone]

## 2024-08-16 NOTE — TELEPHONE ENCOUNTER
Advised Neuro that only surgery listed per Pt's chart was cholecystectomy in 2022 and there is no mention of clips

## 2024-11-18 NOTE — ED TRIAGE NOTES
11/18/24                            Zachary Antoine  [address] [place] [id number]    To Whom It May Concern:    This is to certify Zachary Antoine was evaluated with Gio Méndez PA-C on 11/18/24 and can return to sports on 19 November 2024.     RESTRICTIONS: Activity as tolerated.  No restrictions            Electronically signed by:  Gio Méndez PA-C  [address] [address]  Dept Phone: [phone]        Called for patient to have her blood pressure taken with no answer, also called for patient to be taken to a room  With no answer.

## 2025-03-28 ENCOUNTER — HOSPITAL ENCOUNTER (EMERGENCY)
Facility: HOSPITAL | Age: 25
Discharge: HOME | End: 2025-03-28
Attending: EMERGENCY MEDICINE
Payer: COMMERCIAL

## 2025-03-28 ENCOUNTER — APPOINTMENT (OUTPATIENT)
Dept: CT IMAGING | Facility: HOSPITAL | Age: 25
End: 2025-03-28
Payer: COMMERCIAL

## 2025-03-28 VITALS
TEMPERATURE: 99.8 F | DIASTOLIC BLOOD PRESSURE: 82 MMHG | SYSTOLIC BLOOD PRESSURE: 121 MMHG | HEART RATE: 103 BPM | OXYGEN SATURATION: 98 % | RESPIRATION RATE: 16 BRPM

## 2025-03-28 DIAGNOSIS — R11.10 VOMITING AND DIARRHEA: Primary | ICD-10-CM

## 2025-03-28 DIAGNOSIS — R19.7 VOMITING AND DIARRHEA: Primary | ICD-10-CM

## 2025-03-28 LAB
ALBUMIN SERPL-MCNC: 4.5 G/DL (ref 3.5–5.2)
ALBUMIN/GLOB SERPL: 1.2 G/DL
ALP SERPL-CCNC: 103 U/L (ref 39–117)
ALT SERPL W P-5'-P-CCNC: 16 U/L (ref 1–33)
ANION GAP SERPL CALCULATED.3IONS-SCNC: 14 MMOL/L (ref 5–15)
AST SERPL-CCNC: 17 U/L (ref 1–32)
BACTERIA UR QL AUTO: ABNORMAL /HPF
BASOPHILS # BLD AUTO: 0.03 10*3/MM3 (ref 0–0.2)
BASOPHILS NFR BLD AUTO: 0.1 % (ref 0–1.5)
BILIRUB SERPL-MCNC: 0.5 MG/DL (ref 0–1.2)
BILIRUB UR QL STRIP: NEGATIVE
BUN SERPL-MCNC: 10 MG/DL (ref 6–20)
BUN/CREAT SERPL: 13.5 (ref 7–25)
CALCIUM SPEC-SCNC: 9.3 MG/DL (ref 8.6–10.5)
CHLORIDE SERPL-SCNC: 100 MMOL/L (ref 98–107)
CLARITY UR: CLEAR
CO2 SERPL-SCNC: 20 MMOL/L (ref 22–29)
COLOR UR: YELLOW
CREAT SERPL-MCNC: 0.74 MG/DL (ref 0.57–1)
DEPRECATED RDW RBC AUTO: 41.3 FL (ref 37–54)
EGFRCR SERPLBLD CKD-EPI 2021: 113.9 ML/MIN/1.73
EOSINOPHIL # BLD AUTO: 0.13 10*3/MM3 (ref 0–0.4)
EOSINOPHIL NFR BLD AUTO: 0.6 % (ref 0.3–6.2)
ERYTHROCYTE [DISTWIDTH] IN BLOOD BY AUTOMATED COUNT: 15.1 % (ref 12.3–15.4)
GLOBULIN UR ELPH-MCNC: 3.7 GM/DL
GLUCOSE SERPL-MCNC: 129 MG/DL (ref 65–99)
GLUCOSE UR STRIP-MCNC: NEGATIVE MG/DL
HCG SERPL QL: NEGATIVE
HCT VFR BLD AUTO: 43.8 % (ref 34–46.6)
HGB BLD-MCNC: 14.1 G/DL (ref 12–15.9)
HGB UR QL STRIP.AUTO: NEGATIVE
HOLD SPECIMEN: NORMAL
HYALINE CASTS UR QL AUTO: ABNORMAL /LPF
IMM GRANULOCYTES # BLD AUTO: 0.09 10*3/MM3 (ref 0–0.05)
IMM GRANULOCYTES NFR BLD AUTO: 0.4 % (ref 0–0.5)
KETONES UR QL STRIP: ABNORMAL
LEUKOCYTE ESTERASE UR QL STRIP.AUTO: NEGATIVE
LIPASE SERPL-CCNC: 8 U/L (ref 13–60)
LYMPHOCYTES # BLD AUTO: 2.22 10*3/MM3 (ref 0.7–3.1)
LYMPHOCYTES NFR BLD AUTO: 9.6 % (ref 19.6–45.3)
MCH RBC QN AUTO: 24.8 PG (ref 26.6–33)
MCHC RBC AUTO-ENTMCNC: 32.2 G/DL (ref 31.5–35.7)
MCV RBC AUTO: 77 FL (ref 79–97)
MONOCYTES # BLD AUTO: 1.24 10*3/MM3 (ref 0.1–0.9)
MONOCYTES NFR BLD AUTO: 5.4 % (ref 5–12)
NEUTROPHILS NFR BLD AUTO: 19.41 10*3/MM3 (ref 1.7–7)
NEUTROPHILS NFR BLD AUTO: 83.9 % (ref 42.7–76)
NITRITE UR QL STRIP: NEGATIVE
NRBC BLD AUTO-RTO: 0 /100 WBC (ref 0–0.2)
PH UR STRIP.AUTO: 5.5 [PH] (ref 5–8)
PLATELET # BLD AUTO: 640 10*3/MM3 (ref 140–450)
PMV BLD AUTO: 9.8 FL (ref 6–12)
POTASSIUM SERPL-SCNC: 4.1 MMOL/L (ref 3.5–5.2)
PROT SERPL-MCNC: 8.2 G/DL (ref 6–8.5)
PROT UR QL STRIP: ABNORMAL
RBC # BLD AUTO: 5.69 10*6/MM3 (ref 3.77–5.28)
RBC # UR STRIP: ABNORMAL /HPF
REF LAB TEST METHOD: ABNORMAL
SODIUM SERPL-SCNC: 134 MMOL/L (ref 136–145)
SP GR UR STRIP: 1.03 (ref 1–1.03)
SQUAMOUS #/AREA URNS HPF: ABNORMAL /HPF
UROBILINOGEN UR QL STRIP: ABNORMAL
WBC # UR STRIP: ABNORMAL /HPF
WBC NRBC COR # BLD AUTO: 23.12 10*3/MM3 (ref 3.4–10.8)
WHOLE BLOOD HOLD COAG: NORMAL
WHOLE BLOOD HOLD SPECIMEN: NORMAL

## 2025-03-28 PROCEDURE — 80053 COMPREHEN METABOLIC PANEL: CPT | Performed by: EMERGENCY MEDICINE

## 2025-03-28 PROCEDURE — 99284 EMERGENCY DEPT VISIT MOD MDM: CPT

## 2025-03-28 PROCEDURE — 25010000002 ONDANSETRON PER 1 MG: Performed by: EMERGENCY MEDICINE

## 2025-03-28 PROCEDURE — 84703 CHORIONIC GONADOTROPIN ASSAY: CPT | Performed by: EMERGENCY MEDICINE

## 2025-03-28 PROCEDURE — 81001 URINALYSIS AUTO W/SCOPE: CPT | Performed by: EMERGENCY MEDICINE

## 2025-03-28 PROCEDURE — 83690 ASSAY OF LIPASE: CPT | Performed by: EMERGENCY MEDICINE

## 2025-03-28 PROCEDURE — 74176 CT ABD & PELVIS W/O CONTRAST: CPT

## 2025-03-28 PROCEDURE — 25810000003 LACTATED RINGERS SOLUTION: Performed by: EMERGENCY MEDICINE

## 2025-03-28 PROCEDURE — 36415 COLL VENOUS BLD VENIPUNCTURE: CPT

## 2025-03-28 PROCEDURE — 85025 COMPLETE CBC W/AUTO DIFF WBC: CPT | Performed by: EMERGENCY MEDICINE

## 2025-03-28 PROCEDURE — 96374 THER/PROPH/DIAG INJ IV PUSH: CPT

## 2025-03-28 RX ORDER — ONDANSETRON 2 MG/ML
4 INJECTION INTRAMUSCULAR; INTRAVENOUS ONCE
Status: COMPLETED | OUTPATIENT
Start: 2025-03-28 | End: 2025-03-28

## 2025-03-28 RX ORDER — IOPAMIDOL 612 MG/ML
100 INJECTION, SOLUTION INTRAVASCULAR
Status: DISCONTINUED | OUTPATIENT
Start: 2025-03-28 | End: 2025-03-29 | Stop reason: HOSPADM

## 2025-03-28 RX ORDER — SODIUM CHLORIDE 0.9 % (FLUSH) 0.9 %
10 SYRINGE (ML) INJECTION AS NEEDED
Status: DISCONTINUED | OUTPATIENT
Start: 2025-03-28 | End: 2025-03-29 | Stop reason: HOSPADM

## 2025-03-28 RX ORDER — ONDANSETRON 4 MG/1
4 TABLET, ORALLY DISINTEGRATING ORAL 4 TIMES DAILY PRN
Qty: 15 TABLET | Refills: 0 | Status: SHIPPED | OUTPATIENT
Start: 2025-03-28

## 2025-03-28 RX ADMIN — SODIUM CHLORIDE, POTASSIUM CHLORIDE, SODIUM LACTATE AND CALCIUM CHLORIDE 1000 ML: 600; 310; 30; 20 INJECTION, SOLUTION INTRAVENOUS at 21:05

## 2025-03-28 RX ADMIN — ONDANSETRON 4 MG: 2 INJECTION, SOLUTION INTRAMUSCULAR; INTRAVENOUS at 21:05

## 2025-03-28 NOTE — Clinical Note
[facility] EMERGENCY DEPARTMENT  [address] [facility] [address]  Phone: [phone]    Beth Gaviria was seen and treated in our emergency department on 3/28/2025.  Beth may return to work on 03/30/2025.         Thank you for choosing [facility].    Karri Shankar MD

## 2025-03-29 NOTE — ED PROVIDER NOTES
EMERGENCY DEPARTMENT ENCOUNTER    Room Number:    PCP: Sneha Cota APRN  Historian: Patient      HPI:  Chief Complaint: Abdominal pain vomiting and diarrhea  A complete HPI/ROS/PMH/PSH/SH/FH are unobtainable due to: None  Context: Beth Gaviria is a 25 y.o. adult who presents to the ED c/o abdominal pain vomiting and diarrhea.  Patient states urinary symptoms and flank pain started on Wednesday.  Was seen in urgent care and had urinalysis that was negative.  Patient states has had vomiting and diarrhea since then.  Has been having crampy abdominal pain.  Has had no fevers or chills.  Has had no blood in the stool.  Has had no prior sick contacts            PAST MEDICAL HISTORY  Active Ambulatory Problems     Diagnosis Date Noted    H/O gestational diabetes mellitus, not currently pregnant 2020    Menorrhagia with regular cycle 2018    Prediabetes 2020    Seasonal allergies 2020    Morbid obesity with BMI of 45.0-49.9, adult 2020    Previous  delivery, antepartum 2022    Pregnancy 2022    Twin gestation, dichorionic/diamniotic (two placentae, two amniotic sacs) 2022    Gestational diabetes mellitus (GDM) controlled on oral hypoglycemic drug, antepartum 2022    Pre-existing essential hypertension complicating pregnancy, third trimester 2022    Dichorionic diamniotic twin pregnancy in third trimester 2022    Chronic hypertension with exacerbation during pregnancy in third trimester 2022    Diet controlled gestational diabetes mellitus (GDM) in third trimester 2022    Macrosomia of fetus affecting management of mother in third trimester 2022    Polyhydramnios in third trimester 2022    Maternal morbid obesity in third trimester, antepartum 2022    Previous  delivery, antepartum 2022    Hypothyroid in pregnancy, antepartum, third trimester 2022    Decreased fetal movements in third  trimester 2022    S/P  section 2022    Attention deficit hyperactivity disorder (ADHD) 2022    Essential hypertension 2022    History of pre-eclampsia 2022    Syncope 2022    DIETER (generalized anxiety disorder) 2023    PTSD (post-traumatic stress disorder) 2023    Unspecified mood (affective) disorder 2023    Borderline personality disorder 2023    Acid reflux 2023    High risk medication use 2023    Dissociation 2023    Encounter for sterilization 2023    Abdominal pain, epigastric 2011    RUQ abdominal pain 2015    Back pain 2023    Difficult or painful urination 2023    Increased frequency of urination 2023     Resolved Ambulatory Problems     Diagnosis Date Noted    Dichorionic diamniotic twin pregnancy in second trimester 2022     Past Medical History:   Diagnosis Date    ADHD (attention deficit hyperactivity disorder)     Anemia     Anxiety     Chronic hypertension     COVID 2021    Depression     Esophageal spasm     GERD (gastroesophageal reflux disease)     Gestational diabetes 2018    Gestational hypertension     Head injury     Insomnia     Migraine, chronic, without aura     Nausea     PONV (postoperative nausea and vomiting)     Preeclampsia 2018    Varicella          PAST SURGICAL HISTORY  Past Surgical History:   Procedure Laterality Date     SECTION       SECTION N/A 2022    Procedure:  SECTION REPEAT;  Surgeon: Luis Alberto Asher MD;  Location: [facility] LABOR DELIVERY;  Service: Obstetrics/Gynecology;  Laterality: N/A;    COLONOSCOPY      DIAGNOSTIC LAPAROSCOPY N/A 2023    Procedure: BILATERAL LAPAROSCOPIC SALPINGECTOMY AND REMOVAL OF NEXPLANON IMPLANT;  Surgeon: Luis Alberto Asher MD;  Location: [facility] OR;  Service: Obstetrics/Gynecology;  Laterality: N/A;    ENDOSCOPY      LAPAROSCOPIC CHOLECYSTECTOMY  2018    TONSILLECTOMY       WISDOM TOOTH EXTRACTION           FAMILY HISTORY  Family History   Problem Relation Age of Onset    Diabetes Mother     Heart disease Mother     Hypertension Mother     Coronary artery disease Mother     Diabetes Father     Cancer Father     Hypertension Father     Heart attack Father     Deep vein thrombosis Maternal Uncle     Cancer Maternal Grandmother         breast    Breast cancer Maternal Grandmother     Heart attack Paternal Grandmother     Stroke Paternal Grandmother     Ovarian cancer Neg Hx     Uterine cancer Neg Hx     Colon cancer Neg Hx     Pulmonary embolism Neg Hx     Malig Hyperthermia Neg Hx          SOCIAL HISTORY  Social History     Socioeconomic History    Marital status: Single    Number of children: 1   Tobacco Use    Smoking status: Former     Current packs/day: 0.00     Types: Electronic Cigarette, Cigarettes     Start date: 10/31/2015     Quit date: 10/31/2017     Years since quittin.4    Smokeless tobacco: Never    Tobacco comments:     1 pack a week   Vaping Use    Vaping status: Former    Quit date: 10/31/2017    Substances: Nicotine, Flavoring    Devices: Refillable tank   Substance and Sexual Activity    Alcohol use: Not Currently    Drug use: Never    Sexual activity: Yes     Partners: Male     Birth control/protection: Nexplanon         ALLERGIES  Buspirone, Lamotrigine, Oxcarbazepine, Mirtazapine, Other, Amphetamine-dextroamphetamine, and Metoclopramide        REVIEW OF SYSTEMS  Review of Systems   Abdominal pain vomiting diarrhea      PHYSICAL EXAM  ED Triage Vitals [25]   Temp Heart Rate Resp BP SpO2   99.8 °F (37.7 °C) (!) 131 20 152/98 97 %      Temp src Heart Rate Source Patient Position BP Location FiO2 (%)   Tympanic Monitor -- -- --       Physical Exam      GENERAL: no acute distress  HENT: nares patent  EYES: no scleral icterus  CV: regular rhythm, normal rate  RESPIRATORY: normal effort  ABDOMEN: soft.  Mild diffuse tenderness  MUSCULOSKELETAL: no  deformity  NEURO: alert, moves all extremities, follows commands  PSYCH:  calm, cooperative  SKIN: warm, dry    Vital signs and nursing notes reviewed.          LAB RESULTS  Recent Results (from the past 24 hours)   Comprehensive Metabolic Panel    Collection Time: 03/28/25  8:17 PM    Specimen: Hand, Right; Blood   Result Value Ref Range    Glucose 129 (H) 65 - 99 mg/dL    BUN 10 6 - 20 mg/dL    Creatinine 0.74 0.57 - 1.00 mg/dL    Sodium 134 (L) 136 - 145 mmol/L    Potassium 4.1 3.5 - 5.2 mmol/L    Chloride 100 98 - 107 mmol/L    CO2 20.0 (L) 22.0 - 29.0 mmol/L    Calcium 9.3 8.6 - 10.5 mg/dL    Total Protein 8.2 6.0 - 8.5 g/dL    Albumin 4.5 3.5 - 5.2 g/dL    ALT (SGPT) 16 1 - 33 U/L    AST (SGOT) 17 1 - 32 U/L    Alkaline Phosphatase 103 39 - 117 U/L    Total Bilirubin 0.5 0.0 - 1.2 mg/dL    Globulin 3.7 gm/dL    A/G Ratio 1.2 g/dL    BUN/Creatinine Ratio 13.5 7.0 - 25.0    Anion Gap 14.0 5.0 - 15.0 mmol/L    eGFR 113.9 >60.0 mL/min/1.73   Lipase    Collection Time: 03/28/25  8:17 PM    Specimen: Hand, Right; Blood   Result Value Ref Range    Lipase 8 (L) 13 - 60 U/L   hCG, Serum, Qualitative    Collection Time: 03/28/25  8:17 PM    Specimen: Hand, Right; Blood   Result Value Ref Range    HCG Qualitative Negative Negative   Green Top (Gel)    Collection Time: 03/28/25  8:17 PM   Result Value Ref Range    Extra Tube Hold for add-ons.    Lavender Top    Collection Time: 03/28/25  8:17 PM   Result Value Ref Range    Extra Tube hold for add-on    Light Blue Top    Collection Time: 03/28/25  8:17 PM   Result Value Ref Range    Extra Tube Hold for add-ons.    CBC Auto Differential    Collection Time: 03/28/25  8:17 PM    Specimen: Hand, Right; Blood   Result Value Ref Range    WBC 23.12 (H) 3.40 - 10.80 10*3/mm3    RBC 5.69 (H) 3.77 - 5.28 10*6/mm3    Hemoglobin 14.1 12.0 - 15.9 g/dL    Hematocrit 43.8 34.0 - 46.6 %    MCV 77.0 (L) 79.0 - 97.0 fL    MCH 24.8 (L) 26.6 - 33.0 pg    MCHC 32.2 31.5 - 35.7 g/dL    RDW 15.1  12.3 - 15.4 %    RDW-SD 41.3 37.0 - 54.0 fl    MPV 9.8 6.0 - 12.0 fL    Platelets 640 (H) 140 - 450 10*3/mm3    Neutrophil % 83.9 (H) 42.7 - 76.0 %    Lymphocyte % 9.6 (L) 19.6 - 45.3 %    Monocyte % 5.4 5.0 - 12.0 %    Eosinophil % 0.6 0.3 - 6.2 %    Basophil % 0.1 0.0 - 1.5 %    Immature Grans % 0.4 0.0 - 0.5 %    Neutrophils, Absolute 19.41 (H) 1.70 - 7.00 10*3/mm3    Lymphocytes, Absolute 2.22 0.70 - 3.10 10*3/mm3    Monocytes, Absolute 1.24 (H) 0.10 - 0.90 10*3/mm3    Eosinophils, Absolute 0.13 0.00 - 0.40 10*3/mm3    Basophils, Absolute 0.03 0.00 - 0.20 10*3/mm3    Immature Grans, Absolute 0.09 (H) 0.00 - 0.05 10*3/mm3    nRBC 0.0 0.0 - 0.2 /100 WBC   Urinalysis With Microscopic If Indicated (No Culture) - Urine, Clean Catch    Collection Time: 03/28/25  9:06 PM    Specimen: Urine, Clean Catch   Result Value Ref Range    Color, UA Yellow Yellow, Straw    Appearance, UA Clear Clear    pH, UA 5.5 5.0 - 8.0    Specific Gravity, UA 1.028 1.005 - 1.030    Glucose, UA Negative Negative    Ketones, UA Trace (A) Negative    Bilirubin, UA Negative Negative    Blood, UA Negative Negative    Protein, UA 30 mg/dL (1+) (A) Negative    Leuk Esterase, UA Negative Negative    Nitrite, UA Negative Negative    Urobilinogen, UA 0.2 E.U./dL 0.2 - 1.0 E.U./dL   Urinalysis, Microscopic Only - Urine, Clean Catch    Collection Time: 03/28/25  9:06 PM    Specimen: Urine, Clean Catch   Result Value Ref Range    RBC, UA None Seen None Seen, 0-2 /HPF    WBC, UA 0-2 None Seen, 0-2 /HPF    Bacteria, UA None Seen None Seen /HPF    Squamous Epithelial Cells, UA 3-6 (A) None Seen, 0-2 /HPF    Hyaline Casts, UA None Seen None Seen /LPF    Methodology Manual Light Microscopy        Ordered the above labs and reviewed the results.        RADIOLOGY  CT Abdomen Pelvis Without Contrast  Result Date: 3/28/2025  CT OF THE ABDOMEN PELVIS WITHOUT CONTRAST  HISTORY: Abdominal pain. Vomiting and diarrhea.  COMPARISON: None available.  TECHNIQUE: This  examination was originally ordered as a CT of the abdomen and pelvis with contrast. However, the patient was only able to tolerate approximately 15 cc of contrast. Subsequently, the order was changed to a CT of the abdomen pelvis without contrast. Axial CT imaging was obtained through the abdomen and pelvis.  FINDINGS: Images through the lung bases are clear. No suspicious hepatic lesions are seen. The stomach, duodenum, adrenal glands, spleen, and pancreas are all normal. Gallbladder is absent. No hydronephrosis is seen on either side. Excreted contrast material is noted within the renal collecting systems bilaterally, as well as within the urinary bladder. Uterus is grossly normal. No adnexal masses are seen. There is no bowel obstruction. The appendix is normal. Patient does have some mildly thick walled loops of small bowel noted within the lower abdomen appearance is favored to reflect enteritis. There is also some liquid stool which is noted within the ascending colon, in keeping with history of diarrhea. No acute osseous abnormalities are seen.      The patient has some mildly thick walled loops of small bowel noted within the lower abdomen. Appearance suggests enteritis. Liquid stool within the ascending colon would be in keeping with history of diarrhea.  Radiation dose reduction techniques were utilized, including automated exposure control and exposure modulation based on body size.   This report was finalized on 3/28/2025 10:34 PM by Dr. Ximena Gallardo M.D on Workstation: [id number]        Ordered the above noted radiological studies.  CT abdomen independent interpreted by me and shows no evidence of obstruction          PROCEDURES  Procedures          MEDICATIONS GIVEN IN ER  Medications   sodium chloride 0.9 % flush 10 mL (has no administration in time range)   iopamidol (ISOVUE-300) 61 % injection 100 mL (has no administration in time range)   lactated ringers bolus 1,000 mL (0 mL Intravenous  Stopped 3/28/25 2312)   ondansetron (ZOFRAN) injection 4 mg (4 mg Intravenous Given 3/28/25 2105)                   MEDICAL DECISION MAKING, PROGRESS, and CONSULTS    All labs have been independently reviewed by me.  All radiology studies have been reviewed by me and I have also reviewed the radiology report.   EKG's independently viewed and interpreted by me.  Discussion below represents my analysis of pertinent findings related to patient's condition, differential diagnosis, treatment plan and final disposition.      Additional sources:  - Discussed/ obtained information from independent historians: None    - External (non-ED) record review: Epic reviewed and patient seen 3/26/2025 primary care doctor's office for urinary tract symptoms    - Chronic or social conditions impacting care: None    - Shared decision making: None      Orders placed during this visit:  Orders Placed This Encounter   Procedures    CT Abdomen Pelvis Without Contrast    [place] Draw    Comprehensive Metabolic Panel    Lipase    Urinalysis With Microscopic If Indicated (No Culture) - Urine, Clean Catch    hCG, Serum, Qualitative    CBC Auto Differential    Urinalysis, Microscopic Only - Urine, Clean Catch    NPO Diet NPO Type: Strict NPO    Undress & Gown    Insert Peripheral IV    CBC & Differential    Green Top (Gel)    Lavender Top    Light Blue Top         Additional orders considered but not ordered:  None        Differential diagnosis includes but is not limited to:    Gastroenteritis versus gastritis versus obstruction      Independent interpretation of labs, radiology studies, and discussions with consultants:  ED Course as of 03/28/25 2332   Fri Mar 28, 2025   1225 22:59 EDT  Patient presents for evaluation of abdominal pain vomiting and diarrhea.  CT scan consistent with gastroenteritis.  Patient has lab work that appears unremarkable except for white blood cell count is elevated.  Patient has had no vomiting or diarrhea since  arrival.  Will be discharged home.  Zofran Imodium.  Instructed to return here for any concerns. [SL]      ED Course User Index  [SL] Karri Shankar MD                 DIAGNOSIS  Final diagnoses:   Vomiting and diarrhea         DISPOSITION  admit            Latest Documented Vital Signs:  As of 23:32 EDT  BP- 121/82 HR- 103 Temp- 99.8 °F (37.7 °C) (Tympanic) O2 sat- 98%              --    Please note that portions of this were completed with a voice recognition program.       Note Disclaimer: At [facility], we believe that sharing information builds trust and better relationships. You are receiving this note because you are receiving care at [facility] or recently visited. It is possible you will see health information before a provider has talked with you about it. This kind of information can be easy to misunderstand. To help you fully understand what it means for your health, we urge you to discuss this note with your provider.            Karri Shankar MD  03/28/25 5144

## 2025-08-19 ENCOUNTER — TELEPHONE (OUTPATIENT)
Dept: FAMILY MEDICINE CLINIC | Facility: CLINIC | Age: 25
End: 2025-08-19
Payer: COMMERCIAL

## (undated) DEVICE — GLV SURG BIOGEL LTX PF 7 1/2

## (undated) DEVICE — 3M(TM) TEGADERM(TM) TRANSPARENT FILM DRESSING FRAME STYLE 1627: Brand: 3M™ TEGADERM™

## (undated) DEVICE — SOL IRR H2O BTL 1000ML STRL

## (undated) DEVICE — ANTIBACTERIAL UNDYED BRAIDED (POLYGLACTIN 910), SYNTHETIC ABSORBABLE SUTURE: Brand: COATED VICRYL

## (undated) DEVICE — SUT MNCRYL 0/0 CTX 36IN Y398H

## (undated) DEVICE — LAPAROSCOPIC SMOKE FILTRATION SYSTEM: Brand: PALL LAPAROSHIELD® PLUS LAPAROSCOPIC SMOKE FILTRATION SYSTEM

## (undated) DEVICE — 3M™ STERI-STRIP™ REINFORCED ADHESIVE SKIN CLOSURES, R1547, 1/2 IN X 4 IN (12 MM X 100 MM), 6 STRIPS/ENVELOPE: Brand: 3M™ STERI-STRIP™

## (undated) DEVICE — PICO 7 10CM X 30CM: Brand: PICO™ 7

## (undated) DEVICE — SUT MNCRYL 3/0 CT1 36 IN Y944H

## (undated) DEVICE — LAPAROVUE VISIBILITY SYSTEM LAPAROSCOPIC SOLUTIONS: Brand: LAPAROVUE

## (undated) DEVICE — SLV SCD KN ADJ EXPRSS LG

## (undated) DEVICE — UNDYED BRAIDED (POLYGLACTIN 910), SYNTHETIC ABSORBABLE SUTURE: Brand: COATED VICRYL

## (undated) DEVICE — SOL NACL 0.9PCT 1000ML

## (undated) DEVICE — ENDOPATH XCEL BLUNT TIP TROCARS WITH SMOOTH SLEEVES: Brand: ENDOPATH XCEL

## (undated) DEVICE — TRAXI PANNICULUS RETRACTOR WITH RETENTUS TECHNOLOGY (BMI 30-50): Brand: TRAXI® PANNICULUS RETRACTOR

## (undated) DEVICE — SUT PLAIN  3/0 CT1 27IN 842H

## (undated) DEVICE — ENSEAL TRIO TEMPERATURE CONTOLLED TISSUE SEALING TECHNOLOGY DISPOSABLE TISSUE SEALING DEVICE TAPTRONIC TRIGGER ACTIVATED POWER 3MM CURVED JAW: Brand: ENSEAL

## (undated) DEVICE — LOU GYN LAPAROSCOPY: Brand: MEDLINE INDUSTRIES, INC.

## (undated) DEVICE — ADHS SKIN SURG TISS VISC PREMIERPRO EXOFIN HI/VISC FAST/DRY

## (undated) DEVICE — GLV SURG SIGNATURE ESSENTIAL PF LTX SZ7.5

## (undated) DEVICE — GLV SURG BIOGEL LTX PF 7

## (undated) DEVICE — SUT VIC 0 UR6 27IN VCP603H

## (undated) DEVICE — KT ANTI FOG W/FLD AND SPNG

## (undated) DEVICE — 3M™ STERI-STRIP™ COMPOUND BENZOIN TINCTURE 40 BAGS/CARTON 4 CARTONS/CASE C1544: Brand: 3M™ STERI-STRIP™

## (undated) DEVICE — ENDOPATH XCEL DILATING TIP TROCARS WITH STABILITY SLEEVES: Brand: ENDOPATH XCEL

## (undated) DEVICE — 2, DISPOSABLE SUCTION/IRRIGATOR WITH DISPOSABLE TIP: Brand: STRYKEFLOW

## (undated) DEVICE — KT ART BLD GAS QUICK DRAW